# Patient Record
Sex: FEMALE | Race: WHITE | NOT HISPANIC OR LATINO | Employment: FULL TIME | URBAN - METROPOLITAN AREA
[De-identification: names, ages, dates, MRNs, and addresses within clinical notes are randomized per-mention and may not be internally consistent; named-entity substitution may affect disease eponyms.]

---

## 2021-04-22 ENCOUNTER — IMMUNIZATION (OUTPATIENT)
Dept: VACCINE CLINIC | Facility: HOSPITAL | Age: 36
End: 2021-04-22

## 2021-04-22 PROCEDURE — 91300 HC SARSCOV02 VAC 30MCG/0.3ML IM: CPT | Performed by: INTERNAL MEDICINE

## 2021-04-22 PROCEDURE — 0001A: CPT | Performed by: INTERNAL MEDICINE

## 2021-05-13 ENCOUNTER — IMMUNIZATION (OUTPATIENT)
Dept: VACCINE CLINIC | Facility: HOSPITAL | Age: 36
End: 2021-05-13

## 2021-05-13 PROCEDURE — 91300 HC SARSCOV02 VAC 30MCG/0.3ML IM: CPT | Performed by: INTERNAL MEDICINE

## 2021-05-13 PROCEDURE — 0002A: CPT | Performed by: INTERNAL MEDICINE

## 2021-09-17 ENCOUNTER — IMMUNIZATION (OUTPATIENT)
Dept: VACCINE CLINIC | Facility: HOSPITAL | Age: 36
End: 2021-09-17

## 2021-09-17 PROCEDURE — 0003A: CPT | Performed by: INTERNAL MEDICINE

## 2021-09-17 PROCEDURE — 91300 HC SARSCOV02 VAC 30MCG/0.3ML IM: CPT | Performed by: INTERNAL MEDICINE

## 2023-12-06 ENCOUNTER — HOSPITAL ENCOUNTER (INPATIENT)
Facility: HOSPITAL | Age: 38
LOS: 6 days | Discharge: HOME | DRG: 896 | End: 2023-12-12
Attending: EMERGENCY MEDICINE | Admitting: STUDENT IN AN ORGANIZED HEALTH CARE EDUCATION/TRAINING PROGRAM
Payer: OTHER GOVERNMENT

## 2023-12-06 DIAGNOSIS — R56.9 SEIZURE: Primary | ICD-10-CM

## 2023-12-06 DIAGNOSIS — E87.6 HYPOKALEMIA: ICD-10-CM

## 2023-12-06 DIAGNOSIS — E83.42 HYPOMAGNESEMIA: ICD-10-CM

## 2023-12-06 DIAGNOSIS — F10.930 ALCOHOL WITHDRAWAL SYNDROME WITHOUT COMPLICATION: ICD-10-CM

## 2023-12-06 DIAGNOSIS — D64.9 ANEMIA, UNSPECIFIED TYPE: ICD-10-CM

## 2023-12-06 DIAGNOSIS — K70.30 ALCOHOLIC CIRRHOSIS, UNSPECIFIED WHETHER ASCITES PRESENT: ICD-10-CM

## 2023-12-06 DIAGNOSIS — F10.10 ALCOHOL ABUSE: ICD-10-CM

## 2023-12-06 DIAGNOSIS — D69.6 THROMBOCYTOPENIA: ICD-10-CM

## 2023-12-06 PROBLEM — D61.818 PANCYTOPENIA: Status: ACTIVE | Noted: 2023-12-06

## 2023-12-06 PROBLEM — I85.00 ESOPHAGEAL VARICES: Status: ACTIVE | Noted: 2023-12-06

## 2023-12-06 PROBLEM — E11.9 DIABETES MELLITUS: Status: ACTIVE | Noted: 2023-12-06

## 2023-12-06 PROBLEM — F32.A DEPRESSION: Status: ACTIVE | Noted: 2023-12-06

## 2023-12-06 PROBLEM — F41.9 ANXIETY: Status: ACTIVE | Noted: 2023-12-06

## 2023-12-06 PROBLEM — K70.10 ALCOHOLIC HEPATITIS: Status: ACTIVE | Noted: 2023-12-06

## 2023-12-06 PROBLEM — F10.939 ALCOHOL WITHDRAWAL SEIZURE: Status: ACTIVE | Noted: 2023-12-06

## 2023-12-06 LAB
ALBUMIN SERPL-MCNC: 4.2 G/DL (ref 3.5–5.2)
ALBUMIN/GLOB SERPL: 1.4 G/DL
ALP SERPL-CCNC: 300 U/L (ref 39–117)
ALT SERPL W P-5'-P-CCNC: 54 U/L (ref 1–33)
AMMONIA BLD-SCNC: 33 UMOL/L (ref 11–51)
AMPHET+METHAMPHET UR QL: NEGATIVE
ANION GAP SERPL CALCULATED.3IONS-SCNC: 14 MMOL/L (ref 5–15)
AST SERPL-CCNC: 276 U/L (ref 1–32)
BARBITURATES UR QL SCN: NEGATIVE
BASOPHILS # BLD AUTO: 0.03 10*3/MM3 (ref 0–0.2)
BASOPHILS NFR BLD AUTO: 1 % (ref 0–1.5)
BENZODIAZ UR QL SCN: NEGATIVE
BILIRUB SERPL-MCNC: 8.4 MG/DL (ref 0–1.2)
BILIRUB UR QL STRIP: NEGATIVE
BUN SERPL-MCNC: 7 MG/DL (ref 6–20)
BUN/CREAT SERPL: 14.9 (ref 7–25)
CALCIUM SPEC-SCNC: 9.1 MG/DL (ref 8.6–10.5)
CANNABINOIDS SERPL QL: NEGATIVE
CHLORIDE SERPL-SCNC: 94 MMOL/L (ref 98–107)
CK SERPL-CCNC: 92 U/L (ref 20–180)
CLARITY UR: ABNORMAL
CO2 SERPL-SCNC: 28 MMOL/L (ref 22–29)
COCAINE UR QL: NEGATIVE
COLOR UR: ABNORMAL
CREAT SERPL-MCNC: 0.47 MG/DL (ref 0.57–1)
DEPRECATED RDW RBC AUTO: 49 FL (ref 37–54)
EGFRCR SERPLBLD CKD-EPI 2021: 124.4 ML/MIN/1.73
EOSINOPHIL # BLD AUTO: 0 10*3/MM3 (ref 0–0.4)
EOSINOPHIL NFR BLD AUTO: 0 % (ref 0.3–6.2)
ERYTHROCYTE [DISTWIDTH] IN BLOOD BY AUTOMATED COUNT: 14.9 % (ref 12.3–15.4)
ETHANOL BLD-MCNC: <10 MG/DL (ref 0–10)
ETHANOL UR QL: <0.01 %
FENTANYL UR-MCNC: NEGATIVE NG/ML
GLOBULIN UR ELPH-MCNC: 2.9 GM/DL
GLUCOSE SERPL-MCNC: 65 MG/DL (ref 65–99)
GLUCOSE UR STRIP-MCNC: NEGATIVE MG/DL
HCG SERPL QL: NEGATIVE
HCT VFR BLD AUTO: 33.8 % (ref 34–46.6)
HGB BLD-MCNC: 11.6 G/DL (ref 12–15.9)
HGB UR QL STRIP.AUTO: NEGATIVE
INR PPP: 1.81 (ref 0.9–1.1)
KETONES UR QL STRIP: ABNORMAL
LEUKOCYTE ESTERASE UR QL STRIP.AUTO: NEGATIVE
LYMPHOCYTES # BLD AUTO: 0.45 10*3/MM3 (ref 0.7–3.1)
LYMPHOCYTES NFR BLD AUTO: 14.4 % (ref 19.6–45.3)
MAGNESIUM SERPL-MCNC: 1 MG/DL (ref 1.6–2.6)
MCH RBC QN AUTO: 30.9 PG (ref 26.6–33)
MCHC RBC AUTO-ENTMCNC: 34.3 G/DL (ref 31.5–35.7)
MCV RBC AUTO: 89.9 FL (ref 79–97)
METHADONE UR QL SCN: NEGATIVE
MONOCYTES # BLD AUTO: 0.25 10*3/MM3 (ref 0.1–0.9)
MONOCYTES NFR BLD AUTO: 8 % (ref 5–12)
NEUTROPHILS NFR BLD AUTO: 2.37 10*3/MM3 (ref 1.7–7)
NEUTROPHILS NFR BLD AUTO: 76 % (ref 42.7–76)
NITRITE UR QL STRIP: NEGATIVE
OPIATES UR QL: NEGATIVE
OXYCODONE UR QL SCN: NEGATIVE
PH UR STRIP.AUTO: 8.5 [PH] (ref 5–8)
PLATELET # BLD AUTO: 28 10*3/MM3 (ref 140–450)
PMV BLD AUTO: 12.5 FL (ref 6–12)
POTASSIUM SERPL-SCNC: 2.9 MMOL/L (ref 3.5–5.2)
PROT SERPL-MCNC: 7.1 G/DL (ref 6–8.5)
PROT UR QL STRIP: ABNORMAL
PROTHROMBIN TIME: 21.3 SECONDS (ref 11.7–14.2)
RBC # BLD AUTO: 3.76 10*6/MM3 (ref 3.77–5.28)
SODIUM SERPL-SCNC: 136 MMOL/L (ref 136–145)
SP GR UR STRIP: 1.01 (ref 1–1.03)
UROBILINOGEN UR QL STRIP: ABNORMAL
WBC NRBC COR # BLD AUTO: 3.12 10*3/MM3 (ref 3.4–10.8)

## 2023-12-06 PROCEDURE — 80053 COMPREHEN METABOLIC PANEL: CPT | Performed by: EMERGENCY MEDICINE

## 2023-12-06 PROCEDURE — 82550 ASSAY OF CK (CPK): CPT | Performed by: EMERGENCY MEDICINE

## 2023-12-06 PROCEDURE — 83735 ASSAY OF MAGNESIUM: CPT | Performed by: EMERGENCY MEDICINE

## 2023-12-06 PROCEDURE — 82077 ASSAY SPEC XCP UR&BREATH IA: CPT | Performed by: EMERGENCY MEDICINE

## 2023-12-06 PROCEDURE — 81003 URINALYSIS AUTO W/O SCOPE: CPT | Performed by: EMERGENCY MEDICINE

## 2023-12-06 PROCEDURE — 85025 COMPLETE CBC W/AUTO DIFF WBC: CPT | Performed by: EMERGENCY MEDICINE

## 2023-12-06 PROCEDURE — 25010000002 MORPHINE PER 10 MG: Performed by: EMERGENCY MEDICINE

## 2023-12-06 PROCEDURE — 25010000002 MAGNESIUM SULFATE 2 GM/50ML SOLUTION: Performed by: EMERGENCY MEDICINE

## 2023-12-06 PROCEDURE — 80307 DRUG TEST PRSMV CHEM ANLYZR: CPT | Performed by: EMERGENCY MEDICINE

## 2023-12-06 PROCEDURE — 25010000002 PHENOBARBITAL PER 120 MG: Performed by: STUDENT IN AN ORGANIZED HEALTH CARE EDUCATION/TRAINING PROGRAM

## 2023-12-06 PROCEDURE — 63710000001 ONDANSETRON PER 8 MG: Performed by: STUDENT IN AN ORGANIZED HEALTH CARE EDUCATION/TRAINING PROGRAM

## 2023-12-06 PROCEDURE — 84703 CHORIONIC GONADOTROPIN ASSAY: CPT | Performed by: EMERGENCY MEDICINE

## 2023-12-06 PROCEDURE — 85610 PROTHROMBIN TIME: CPT | Performed by: EMERGENCY MEDICINE

## 2023-12-06 PROCEDURE — 25010000002 THIAMINE HCL 200 MG/2ML SOLUTION: Performed by: EMERGENCY MEDICINE

## 2023-12-06 PROCEDURE — 82140 ASSAY OF AMMONIA: CPT | Performed by: EMERGENCY MEDICINE

## 2023-12-06 PROCEDURE — 99285 EMERGENCY DEPT VISIT HI MDM: CPT

## 2023-12-06 PROCEDURE — 36415 COLL VENOUS BLD VENIPUNCTURE: CPT | Performed by: EMERGENCY MEDICINE

## 2023-12-06 PROCEDURE — 25810000003 SODIUM CHLORIDE 0.9 % SOLUTION: Performed by: EMERGENCY MEDICINE

## 2023-12-06 RX ORDER — SODIUM CHLORIDE 0.9 % (FLUSH) 0.9 %
10 SYRINGE (ML) INJECTION EVERY 12 HOURS SCHEDULED
Status: DISCONTINUED | OUTPATIENT
Start: 2023-12-06 | End: 2023-12-12 | Stop reason: HOSPADM

## 2023-12-06 RX ORDER — POLYETHYLENE GLYCOL 3350 17 G/17G
17 POWDER, FOR SOLUTION ORAL DAILY PRN
Status: DISCONTINUED | OUTPATIENT
Start: 2023-12-06 | End: 2023-12-12 | Stop reason: HOSPADM

## 2023-12-06 RX ORDER — SODIUM CHLORIDE 9 MG/ML
40 INJECTION, SOLUTION INTRAVENOUS AS NEEDED
Status: DISCONTINUED | OUTPATIENT
Start: 2023-12-06 | End: 2023-12-12 | Stop reason: HOSPADM

## 2023-12-06 RX ORDER — SODIUM CHLORIDE 9 MG/ML
75 INJECTION, SOLUTION INTRAVENOUS CONTINUOUS
Status: DISCONTINUED | OUTPATIENT
Start: 2023-12-06 | End: 2023-12-12 | Stop reason: HOSPADM

## 2023-12-06 RX ORDER — PHENOBARBITAL SODIUM 65 MG/ML
65 INJECTION INTRAMUSCULAR ONCE
Status: DISCONTINUED | OUTPATIENT
Start: 2023-12-07 | End: 2023-12-06

## 2023-12-06 RX ORDER — LEVOTHYROXINE SODIUM 0.1 MG/1
100 TABLET ORAL
Status: DISCONTINUED | OUTPATIENT
Start: 2023-12-07 | End: 2023-12-12 | Stop reason: HOSPADM

## 2023-12-06 RX ORDER — MORPHINE SULFATE 2 MG/ML
2 INJECTION, SOLUTION INTRAMUSCULAR; INTRAVENOUS ONCE
Status: COMPLETED | OUTPATIENT
Start: 2023-12-06 | End: 2023-12-06

## 2023-12-06 RX ORDER — LORAZEPAM 1 MG/1
1 TABLET ORAL ONCE
Status: COMPLETED | OUTPATIENT
Start: 2023-12-06 | End: 2023-12-06

## 2023-12-06 RX ORDER — POTASSIUM CHLORIDE 750 MG/1
40 TABLET, FILM COATED, EXTENDED RELEASE ORAL EVERY 4 HOURS
Status: COMPLETED | OUTPATIENT
Start: 2023-12-06 | End: 2023-12-07

## 2023-12-06 RX ORDER — BISACODYL 5 MG/1
5 TABLET, DELAYED RELEASE ORAL DAILY PRN
Status: DISCONTINUED | OUTPATIENT
Start: 2023-12-06 | End: 2023-12-12 | Stop reason: HOSPADM

## 2023-12-06 RX ORDER — ACETAMINOPHEN 160 MG/5ML
650 SOLUTION ORAL EVERY 4 HOURS PRN
Status: DISCONTINUED | OUTPATIENT
Start: 2023-12-06 | End: 2023-12-09

## 2023-12-06 RX ORDER — PANTOPRAZOLE SODIUM 40 MG/1
40 TABLET, DELAYED RELEASE ORAL DAILY
Status: DISCONTINUED | OUTPATIENT
Start: 2023-12-07 | End: 2023-12-07

## 2023-12-06 RX ORDER — MIDAZOLAM HYDROCHLORIDE 5 MG/ML
4 INJECTION INTRAMUSCULAR; INTRAVENOUS
Status: DISCONTINUED | OUTPATIENT
Start: 2023-12-06 | End: 2023-12-12 | Stop reason: HOSPADM

## 2023-12-06 RX ORDER — INSULIN LISPRO 100 [IU]/ML
2-7 INJECTION, SOLUTION INTRAVENOUS; SUBCUTANEOUS
Status: DISCONTINUED | OUTPATIENT
Start: 2023-12-06 | End: 2023-12-12 | Stop reason: HOSPADM

## 2023-12-06 RX ORDER — MIDAZOLAM HYDROCHLORIDE 1 MG/ML
4 INJECTION INTRAMUSCULAR; INTRAVENOUS
Status: DISCONTINUED | OUTPATIENT
Start: 2023-12-06 | End: 2023-12-12 | Stop reason: HOSPADM

## 2023-12-06 RX ORDER — SODIUM CHLORIDE 9 MG/ML
1000 INJECTION, SOLUTION INTRAVENOUS ONCE
Status: COMPLETED | OUTPATIENT
Start: 2023-12-06 | End: 2023-12-06

## 2023-12-06 RX ORDER — LORAZEPAM 1 MG/1
1 TABLET ORAL
Status: DISCONTINUED | OUTPATIENT
Start: 2023-12-06 | End: 2023-12-12 | Stop reason: HOSPADM

## 2023-12-06 RX ORDER — PHENOBARBITAL 64.8 MG/1
32.4 TABLET ORAL ONCE
Status: DISCONTINUED | OUTPATIENT
Start: 2023-12-08 | End: 2023-12-06

## 2023-12-06 RX ORDER — BISACODYL 10 MG
10 SUPPOSITORY, RECTAL RECTAL DAILY PRN
Status: DISCONTINUED | OUTPATIENT
Start: 2023-12-06 | End: 2023-12-12 | Stop reason: HOSPADM

## 2023-12-06 RX ORDER — ONDANSETRON 2 MG/ML
4 INJECTION INTRAMUSCULAR; INTRAVENOUS EVERY 6 HOURS PRN
Status: DISCONTINUED | OUTPATIENT
Start: 2023-12-06 | End: 2023-12-12 | Stop reason: HOSPADM

## 2023-12-06 RX ORDER — PREDNISOLONE SODIUM PHOSPHATE 15 MG/5ML
40 SOLUTION ORAL DAILY
Status: DISCONTINUED | OUTPATIENT
Start: 2023-12-07 | End: 2023-12-12 | Stop reason: HOSPADM

## 2023-12-06 RX ORDER — MAGNESIUM SULFATE HEPTAHYDRATE 40 MG/ML
2 INJECTION, SOLUTION INTRAVENOUS
Status: COMPLETED | OUTPATIENT
Start: 2023-12-06 | End: 2023-12-06

## 2023-12-06 RX ORDER — SODIUM CHLORIDE 0.9 % (FLUSH) 0.9 %
10 SYRINGE (ML) INJECTION AS NEEDED
Status: DISCONTINUED | OUTPATIENT
Start: 2023-12-06 | End: 2023-12-12 | Stop reason: HOSPADM

## 2023-12-06 RX ORDER — MAGNESIUM SULFATE HEPTAHYDRATE 40 MG/ML
2 INJECTION, SOLUTION INTRAVENOUS
Status: COMPLETED | OUTPATIENT
Start: 2023-12-06 | End: 2023-12-07

## 2023-12-06 RX ORDER — PHENOBARBITAL SODIUM 65 MG/ML
65 INJECTION INTRAMUSCULAR ONCE
Status: DISCONTINUED | OUTPATIENT
Start: 2023-12-08 | End: 2023-12-06

## 2023-12-06 RX ORDER — LORAZEPAM 1 MG/1
2 TABLET ORAL
Status: DISCONTINUED | OUTPATIENT
Start: 2023-12-06 | End: 2023-12-12 | Stop reason: HOSPADM

## 2023-12-06 RX ORDER — IBUPROFEN 600 MG/1
1 TABLET ORAL
Status: DISCONTINUED | OUTPATIENT
Start: 2023-12-06 | End: 2023-12-12 | Stop reason: HOSPADM

## 2023-12-06 RX ORDER — ACETAMINOPHEN 650 MG/1
650 SUPPOSITORY RECTAL EVERY 4 HOURS PRN
Status: DISCONTINUED | OUTPATIENT
Start: 2023-12-06 | End: 2023-12-09

## 2023-12-06 RX ORDER — NICOTINE POLACRILEX 4 MG
15 LOZENGE BUCCAL
Status: DISCONTINUED | OUTPATIENT
Start: 2023-12-06 | End: 2023-12-12 | Stop reason: HOSPADM

## 2023-12-06 RX ORDER — ONDANSETRON 4 MG/1
4 TABLET, FILM COATED ORAL EVERY 6 HOURS PRN
Status: DISCONTINUED | OUTPATIENT
Start: 2023-12-06 | End: 2023-12-12 | Stop reason: HOSPADM

## 2023-12-06 RX ORDER — ACETAMINOPHEN 325 MG/1
650 TABLET ORAL EVERY 4 HOURS PRN
Status: DISCONTINUED | OUTPATIENT
Start: 2023-12-06 | End: 2023-12-09

## 2023-12-06 RX ORDER — DEXTROSE MONOHYDRATE 25 G/50ML
25 INJECTION, SOLUTION INTRAVENOUS
Status: DISCONTINUED | OUTPATIENT
Start: 2023-12-06 | End: 2023-12-12 | Stop reason: HOSPADM

## 2023-12-06 RX ORDER — THIAMINE HYDROCHLORIDE 100 MG/ML
200 INJECTION, SOLUTION INTRAMUSCULAR; INTRAVENOUS EVERY 8 HOURS SCHEDULED
Status: DISPENSED | OUTPATIENT
Start: 2023-12-07 | End: 2023-12-12

## 2023-12-06 RX ORDER — THIAMINE HYDROCHLORIDE 100 MG/ML
200 INJECTION, SOLUTION INTRAMUSCULAR; INTRAVENOUS ONCE
Status: COMPLETED | OUTPATIENT
Start: 2023-12-06 | End: 2023-12-06

## 2023-12-06 RX ORDER — PHENOBARBITAL 64.8 MG/1
32.4 TABLET ORAL ONCE
Status: DISCONTINUED | OUTPATIENT
Start: 2023-12-09 | End: 2023-12-06

## 2023-12-06 RX ORDER — FOLIC ACID 1 MG/1
1 TABLET ORAL DAILY
Status: DISCONTINUED | OUTPATIENT
Start: 2023-12-07 | End: 2023-12-12 | Stop reason: HOSPADM

## 2023-12-06 RX ORDER — CHOLECALCIFEROL (VITAMIN D3) 125 MCG
5 CAPSULE ORAL NIGHTLY PRN
Status: DISCONTINUED | OUTPATIENT
Start: 2023-12-06 | End: 2023-12-12 | Stop reason: HOSPADM

## 2023-12-06 RX ORDER — AMOXICILLIN 250 MG
2 CAPSULE ORAL 2 TIMES DAILY PRN
Status: DISCONTINUED | OUTPATIENT
Start: 2023-12-06 | End: 2023-12-12 | Stop reason: HOSPADM

## 2023-12-06 RX ORDER — MIDAZOLAM HYDROCHLORIDE 1 MG/ML
2 INJECTION INTRAMUSCULAR; INTRAVENOUS
Status: DISCONTINUED | OUTPATIENT
Start: 2023-12-06 | End: 2023-12-12 | Stop reason: HOSPADM

## 2023-12-06 RX ADMIN — LORAZEPAM 1 MG: 1 TABLET ORAL at 17:14

## 2023-12-06 RX ADMIN — LORAZEPAM 1 MG: 1 TABLET ORAL at 19:47

## 2023-12-06 RX ADMIN — FOLIC ACID 1 MG: 5 INJECTION, SOLUTION INTRAMUSCULAR; INTRAVENOUS; SUBCUTANEOUS at 17:34

## 2023-12-06 RX ADMIN — MAGNESIUM SULFATE HEPTAHYDRATE 2 G: 40 INJECTION, SOLUTION INTRAVENOUS at 22:45

## 2023-12-06 RX ADMIN — MAGNESIUM SULFATE HEPTAHYDRATE 2 G: 40 INJECTION, SOLUTION INTRAVENOUS at 19:48

## 2023-12-06 RX ADMIN — POTASSIUM CHLORIDE 40 MEQ: 750 TABLET, EXTENDED RELEASE ORAL at 19:12

## 2023-12-06 RX ADMIN — LORAZEPAM 1 MG: 1 TABLET ORAL at 22:44

## 2023-12-06 RX ADMIN — THIAMINE HYDROCHLORIDE 200 MG: 100 INJECTION, SOLUTION INTRAMUSCULAR; INTRAVENOUS at 17:30

## 2023-12-06 RX ADMIN — Medication 10 ML: at 22:00

## 2023-12-06 RX ADMIN — SODIUM CHLORIDE 1000 ML: 9 INJECTION, SOLUTION INTRAVENOUS at 17:29

## 2023-12-06 RX ADMIN — PHENOBARBITAL SODIUM 109.2 MG: 65 INJECTION INTRAMUSCULAR at 22:30

## 2023-12-06 RX ADMIN — MAGNESIUM SULFATE HEPTAHYDRATE 2 G: 2 INJECTION, SOLUTION INTRAVENOUS at 19:14

## 2023-12-06 RX ADMIN — ONDANSETRON HYDROCHLORIDE 4 MG: 4 TABLET, FILM COATED ORAL at 22:45

## 2023-12-06 RX ADMIN — MORPHINE SULFATE 2 MG: 2 INJECTION, SOLUTION INTRAMUSCULAR; INTRAVENOUS at 19:47

## 2023-12-06 NOTE — ED TRIAGE NOTES
"Patient to er from home per ems with c/o  called 911 related to witnessed the patient having a seizure that lasted aprox 3 min, was tonic- clonic in nature. Ems reported patient stopped drinking about 2 days ago. Reported she drinks about a half gallon a day.\" Patient reported no seizure disorder in the past. Patient alert in triage x 4.   "
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
## 2023-12-07 ENCOUNTER — APPOINTMENT (OUTPATIENT)
Dept: CT IMAGING | Facility: HOSPITAL | Age: 38
DRG: 896 | End: 2023-12-07
Payer: OTHER GOVERNMENT

## 2023-12-07 PROBLEM — D64.9 ANEMIA: Status: ACTIVE | Noted: 2023-12-07

## 2023-12-07 PROBLEM — E43 SEVERE MALNUTRITION: Status: ACTIVE | Noted: 2023-12-07

## 2023-12-07 PROBLEM — D69.6 THROMBOCYTOPENIA: Status: ACTIVE | Noted: 2023-12-07

## 2023-12-07 LAB
ALBUMIN SERPL-MCNC: 3.3 G/DL (ref 3.5–5.2)
ALP SERPL-CCNC: 248 U/L (ref 39–117)
ALT SERPL W P-5'-P-CCNC: 45 U/L (ref 1–33)
ANION GAP SERPL CALCULATED.3IONS-SCNC: 10.3 MMOL/L (ref 5–15)
AST SERPL-CCNC: 216 U/L (ref 1–32)
BASOPHILS # BLD AUTO: 0.02 10*3/MM3 (ref 0–0.2)
BASOPHILS NFR BLD AUTO: 0.3 % (ref 0–1.5)
BILIRUB CONJ SERPL-MCNC: 6.7 MG/DL (ref 0–0.3)
BILIRUB INDIRECT SERPL-MCNC: 1 MG/DL
BILIRUB SERPL-MCNC: 7.7 MG/DL (ref 0–1.2)
BUN SERPL-MCNC: 7 MG/DL (ref 6–20)
BUN/CREAT SERPL: 16.3 (ref 7–25)
CALCIUM SPEC-SCNC: 8.2 MG/DL (ref 8.6–10.5)
CHLORIDE SERPL-SCNC: 100 MMOL/L (ref 98–107)
CO2 SERPL-SCNC: 22.7 MMOL/L (ref 22–29)
CREAT SERPL-MCNC: 0.43 MG/DL (ref 0.57–1)
DEPRECATED RDW RBC AUTO: 48.3 FL (ref 37–54)
EGFRCR SERPLBLD CKD-EPI 2021: 127.1 ML/MIN/1.73
EOSINOPHIL # BLD AUTO: 0.02 10*3/MM3 (ref 0–0.4)
EOSINOPHIL NFR BLD AUTO: 0.3 % (ref 0.3–6.2)
ERYTHROCYTE [DISTWIDTH] IN BLOOD BY AUTOMATED COUNT: 14.4 % (ref 12.3–15.4)
GLUCOSE BLDC GLUCOMTR-MCNC: 196 MG/DL (ref 70–130)
GLUCOSE BLDC GLUCOMTR-MCNC: 282 MG/DL (ref 70–130)
GLUCOSE BLDC GLUCOMTR-MCNC: 327 MG/DL (ref 70–130)
GLUCOSE BLDC GLUCOMTR-MCNC: 333 MG/DL (ref 70–130)
GLUCOSE SERPL-MCNC: 304 MG/DL (ref 65–99)
HBA1C MFR BLD: 7.9 % (ref 4.8–5.6)
HCT VFR BLD AUTO: 28.8 % (ref 34–46.6)
HGB BLD-MCNC: 10 G/DL (ref 12–15.9)
IMM GRANULOCYTES # BLD AUTO: 0.04 10*3/MM3 (ref 0–0.05)
IMM GRANULOCYTES NFR BLD AUTO: 0.7 % (ref 0–0.5)
LDH SERPL-CCNC: 238 U/L (ref 135–214)
LYMPHOCYTES # BLD AUTO: 0.75 10*3/MM3 (ref 0.7–3.1)
LYMPHOCYTES NFR BLD AUTO: 12.9 % (ref 19.6–45.3)
MAGNESIUM SERPL-MCNC: 2 MG/DL (ref 1.6–2.6)
MAGNESIUM SERPL-MCNC: 2.7 MG/DL (ref 1.6–2.6)
MCH RBC QN AUTO: 32.3 PG (ref 26.6–33)
MCHC RBC AUTO-ENTMCNC: 34.7 G/DL (ref 31.5–35.7)
MCV RBC AUTO: 92.9 FL (ref 79–97)
MONOCYTES # BLD AUTO: 0.42 10*3/MM3 (ref 0.1–0.9)
MONOCYTES NFR BLD AUTO: 7.2 % (ref 5–12)
NEUTROPHILS NFR BLD AUTO: 4.56 10*3/MM3 (ref 1.7–7)
NEUTROPHILS NFR BLD AUTO: 78.6 % (ref 42.7–76)
NRBC BLD AUTO-RTO: 0 /100 WBC (ref 0–0.2)
PHOSPHATE SERPL-MCNC: 2.4 MG/DL (ref 2.5–4.5)
PLATELET # BLD AUTO: 25 10*3/MM3 (ref 140–450)
PLATELET # BLD AUTO: 25 10*3/MM3 (ref 140–450)
PLATELETS.RETICULATED NFR BLD AUTO: 16.4 % (ref 0.9–6.5)
PMV BLD AUTO: 12.7 FL (ref 6–12)
POTASSIUM SERPL-SCNC: 4.2 MMOL/L (ref 3.5–5.2)
POTASSIUM SERPL-SCNC: 4.2 MMOL/L (ref 3.5–5.2)
PROT SERPL-MCNC: 5.9 G/DL (ref 6–8.5)
RBC # BLD AUTO: 3.1 10*6/MM3 (ref 3.77–5.28)
SODIUM SERPL-SCNC: 133 MMOL/L (ref 136–145)
VIT B12 BLD-MCNC: 893 PG/ML (ref 211–946)
WBC NRBC COR # BLD AUTO: 5.81 10*3/MM3 (ref 3.4–10.8)

## 2023-12-07 PROCEDURE — 86258 DGP ANTIBODY EACH IG CLASS: CPT | Performed by: INTERNAL MEDICINE

## 2023-12-07 PROCEDURE — 82948 REAGENT STRIP/BLOOD GLUCOSE: CPT

## 2023-12-07 PROCEDURE — 83036 HEMOGLOBIN GLYCOSYLATED A1C: CPT | Performed by: STUDENT IN AN ORGANIZED HEALTH CARE EDUCATION/TRAINING PROGRAM

## 2023-12-07 PROCEDURE — 63710000001 INSULIN GLARGINE PER 5 UNITS: Performed by: HOSPITALIST

## 2023-12-07 PROCEDURE — 97166 OT EVAL MOD COMPLEX 45 MIN: CPT

## 2023-12-07 PROCEDURE — 25010000002 MIDAZOLAM PER 1 MG: Performed by: STUDENT IN AN ORGANIZED HEALTH CARE EDUCATION/TRAINING PROGRAM

## 2023-12-07 PROCEDURE — 25010000002 THIAMINE HCL 200 MG/2ML SOLUTION: Performed by: STUDENT IN AN ORGANIZED HEALTH CARE EDUCATION/TRAINING PROGRAM

## 2023-12-07 PROCEDURE — 85025 COMPLETE CBC W/AUTO DIFF WBC: CPT | Performed by: STUDENT IN AN ORGANIZED HEALTH CARE EDUCATION/TRAINING PROGRAM

## 2023-12-07 PROCEDURE — 97530 THERAPEUTIC ACTIVITIES: CPT

## 2023-12-07 PROCEDURE — 84132 ASSAY OF SERUM POTASSIUM: CPT | Performed by: EMERGENCY MEDICINE

## 2023-12-07 PROCEDURE — 82607 VITAMIN B-12: CPT | Performed by: INTERNAL MEDICINE

## 2023-12-07 PROCEDURE — 84100 ASSAY OF PHOSPHORUS: CPT | Performed by: STUDENT IN AN ORGANIZED HEALTH CARE EDUCATION/TRAINING PROGRAM

## 2023-12-07 PROCEDURE — 25810000003 SODIUM CHLORIDE 0.9 % SOLUTION: Performed by: STUDENT IN AN ORGANIZED HEALTH CARE EDUCATION/TRAINING PROGRAM

## 2023-12-07 PROCEDURE — 82784 ASSAY IGA/IGD/IGG/IGM EACH: CPT | Performed by: INTERNAL MEDICINE

## 2023-12-07 PROCEDURE — 63710000001 INSULIN LISPRO (HUMAN) PER 5 UNITS: Performed by: STUDENT IN AN ORGANIZED HEALTH CARE EDUCATION/TRAINING PROGRAM

## 2023-12-07 PROCEDURE — 80048 BASIC METABOLIC PNL TOTAL CA: CPT | Performed by: STUDENT IN AN ORGANIZED HEALTH CARE EDUCATION/TRAINING PROGRAM

## 2023-12-07 PROCEDURE — 83735 ASSAY OF MAGNESIUM: CPT | Performed by: STUDENT IN AN ORGANIZED HEALTH CARE EDUCATION/TRAINING PROGRAM

## 2023-12-07 PROCEDURE — 83615 LACTATE (LD) (LDH) ENZYME: CPT | Performed by: INTERNAL MEDICINE

## 2023-12-07 PROCEDURE — 99222 1ST HOSP IP/OBS MODERATE 55: CPT

## 2023-12-07 PROCEDURE — 25010000002 MAGNESIUM SULFATE 2 GM/50ML SOLUTION: Performed by: EMERGENCY MEDICINE

## 2023-12-07 PROCEDURE — 86364 TISS TRNSGLTMNASE EA IG CLAS: CPT | Performed by: INTERNAL MEDICINE

## 2023-12-07 PROCEDURE — 63710000001 PREDNISOLONE PER 5 MG: Performed by: STUDENT IN AN ORGANIZED HEALTH CARE EDUCATION/TRAINING PROGRAM

## 2023-12-07 PROCEDURE — 85055 RETICULATED PLATELET ASSAY: CPT | Performed by: INTERNAL MEDICINE

## 2023-12-07 PROCEDURE — 97161 PT EVAL LOW COMPLEX 20 MIN: CPT

## 2023-12-07 PROCEDURE — 80076 HEPATIC FUNCTION PANEL: CPT | Performed by: HOSPITALIST

## 2023-12-07 RX ORDER — PANTOPRAZOLE SODIUM 40 MG/1
40 TABLET, DELAYED RELEASE ORAL
Status: DISCONTINUED | OUTPATIENT
Start: 2023-12-07 | End: 2023-12-12 | Stop reason: HOSPADM

## 2023-12-07 RX ADMIN — LEVOTHYROXINE SODIUM 100 MCG: 100 TABLET ORAL at 06:30

## 2023-12-07 RX ADMIN — MIDAZOLAM HYDROCHLORIDE 2 MG: 2 INJECTION, SOLUTION INTRAMUSCULAR; INTRAVENOUS at 21:27

## 2023-12-07 RX ADMIN — LORAZEPAM 2 MG: 1 TABLET ORAL at 20:36

## 2023-12-07 RX ADMIN — LORAZEPAM 1 MG: 1 TABLET ORAL at 06:24

## 2023-12-07 RX ADMIN — Medication 10 ML: at 08:29

## 2023-12-07 RX ADMIN — INSULIN GLARGINE 10 UNITS: 100 INJECTION, SOLUTION SUBCUTANEOUS at 22:25

## 2023-12-07 RX ADMIN — MIDAZOLAM HYDROCHLORIDE 4 MG: 2 INJECTION, SOLUTION INTRAMUSCULAR; INTRAVENOUS at 22:25

## 2023-12-07 RX ADMIN — POTASSIUM CHLORIDE 40 MEQ: 750 TABLET, EXTENDED RELEASE ORAL at 04:20

## 2023-12-07 RX ADMIN — LORAZEPAM 1 MG: 1 TABLET ORAL at 09:55

## 2023-12-07 RX ADMIN — RIFAXIMIN 550 MG: 550 TABLET ORAL at 22:25

## 2023-12-07 RX ADMIN — MAGNESIUM SULFATE HEPTAHYDRATE 2 G: 40 INJECTION, SOLUTION INTRAVENOUS at 00:44

## 2023-12-07 RX ADMIN — FOLIC ACID 1 MG: 1 TABLET ORAL at 08:30

## 2023-12-07 RX ADMIN — MIDAZOLAM HYDROCHLORIDE 4 MG: 2 INJECTION, SOLUTION INTRAMUSCULAR; INTRAVENOUS at 16:12

## 2023-12-07 RX ADMIN — THIAMINE HYDROCHLORIDE 200 MG: 100 INJECTION, SOLUTION INTRAMUSCULAR; INTRAVENOUS at 14:34

## 2023-12-07 RX ADMIN — INSULIN LISPRO 5 UNITS: 100 INJECTION, SOLUTION INTRAVENOUS; SUBCUTANEOUS at 22:25

## 2023-12-07 RX ADMIN — INSULIN LISPRO 5 UNITS: 100 INJECTION, SOLUTION INTRAVENOUS; SUBCUTANEOUS at 12:03

## 2023-12-07 RX ADMIN — MIDAZOLAM HYDROCHLORIDE 4 MG: 2 INJECTION, SOLUTION INTRAMUSCULAR; INTRAVENOUS at 22:04

## 2023-12-07 RX ADMIN — MIDAZOLAM HYDROCHLORIDE 4 MG: 2 INJECTION, SOLUTION INTRAMUSCULAR; INTRAVENOUS at 02:00

## 2023-12-07 RX ADMIN — MIDAZOLAM HYDROCHLORIDE 4 MG: 2 INJECTION, SOLUTION INTRAMUSCULAR; INTRAVENOUS at 00:49

## 2023-12-07 RX ADMIN — LORAZEPAM 1 MG: 1 TABLET ORAL at 12:00

## 2023-12-07 RX ADMIN — MAGNESIUM SULFATE HEPTAHYDRATE 2 G: 40 INJECTION, SOLUTION INTRAVENOUS at 02:50

## 2023-12-07 RX ADMIN — LORAZEPAM 1 MG: 1 TABLET ORAL at 04:32

## 2023-12-07 RX ADMIN — THIAMINE HYDROCHLORIDE 200 MG: 100 INJECTION, SOLUTION INTRAMUSCULAR; INTRAVENOUS at 06:16

## 2023-12-07 RX ADMIN — INSULIN LISPRO 4 UNITS: 100 INJECTION, SOLUTION INTRAVENOUS; SUBCUTANEOUS at 17:43

## 2023-12-07 RX ADMIN — POTASSIUM CHLORIDE 40 MEQ: 750 TABLET, EXTENDED RELEASE ORAL at 00:44

## 2023-12-07 RX ADMIN — SODIUM CHLORIDE 75 ML/HR: 9 INJECTION, SOLUTION INTRAVENOUS at 00:56

## 2023-12-07 RX ADMIN — PREDNISOLONE SODIUM PHOSPHATE 40 MG: 15 SOLUTION ORAL at 08:28

## 2023-12-07 RX ADMIN — MIDAZOLAM HYDROCHLORIDE 4 MG: 2 INJECTION, SOLUTION INTRAMUSCULAR; INTRAVENOUS at 17:39

## 2023-12-07 RX ADMIN — PANTOPRAZOLE SODIUM 40 MG: 40 TABLET, DELAYED RELEASE ORAL at 08:29

## 2023-12-07 RX ADMIN — INSULIN LISPRO 2 UNITS: 100 INJECTION, SOLUTION INTRAVENOUS; SUBCUTANEOUS at 01:42

## 2023-12-07 RX ADMIN — MIDAZOLAM HYDROCHLORIDE 4 MG: 2 INJECTION, SOLUTION INTRAMUSCULAR; INTRAVENOUS at 14:31

## 2023-12-07 RX ADMIN — MIDAZOLAM HYDROCHLORIDE 2 MG: 2 INJECTION, SOLUTION INTRAMUSCULAR; INTRAVENOUS at 13:05

## 2023-12-07 RX ADMIN — THIAMINE HYDROCHLORIDE 200 MG: 100 INJECTION, SOLUTION INTRAMUSCULAR; INTRAVENOUS at 22:25

## 2023-12-07 NOTE — THERAPY EVALUATION
Patient Name: Corinna Almonte  : 1985    MRN: [id number]                              Today's Date: 2023       Admit Date: 2023    Visit Dx:     ICD-10-CM ICD-9-CM   1. Seizure  R56.9 780.39   2. Alcoholic cirrhosis, unspecified whether ascites present  K70.30 571.2   3. Hypomagnesemia  E83.42 275.2   4. Hypokalemia  E87.6 276.8   5. Alcohol abuse  F10.10 305.00   6. Alcohol withdrawal syndrome without complication  F10.930 291.81   7. Thrombocytopenia  D69.6 287.5     Patient Active Problem List   Diagnosis    Polysubstance overdose    Alcohol withdrawal seizure    Alcoholic hepatitis    Alcoholic cirrhosis    Esophageal varices    Pancytopenia    Hypomagnesemia    Hypokalemia    Diabetes mellitus    Anxiety    Depression     Past Medical History:   Diagnosis Date    Alcohol abuse with alcohol-induced anxiety disorder     Anxiety     Depression     Disease of thyroid gland     Status post alcohol detoxification      Past Surgical History:   Procedure Laterality Date     SECTION      x2    TONSILLECTOMY        General Information       Row Name 23 0955          Physical Therapy Time and Intention    Document Type evaluation  -CS     Mode of Treatment co-treatment;physical therapy;occupational therapy  due to medical complexity and generalized weakness with balance deficits  -CS       Row Name 23 0955          General Information    Patient Profile Reviewed yes  -CS     Prior Level of Function independent:;gait;transfer;bed mobility  pt denies as use of AD at [facility]  -CS     Existing Precautions/Restrictions fall  -CS     Barriers to Rehab medically complex  -CS       Row Name 23 0955          Living Environment    People in Home spouse  -CS       Row Name 23 0955          Home Main Entrance    Number of Stairs, Main Entrance five  -CS     Stair Railings, Main Entrance none  -CS       Row Name 23 0955          Stairs Within Home, Primary    Number of Stairs, Within  Home, Primary twelve  -CS     Stair Railings, Within Home, Primary railings safe and in good condition  -CS     Stairs Comment, Within Home, Primary bed/bath on 2nd floor  -CS       Row Name 12/07/23 0955          Cognition    Orientation Status (Cognition) oriented to;person;place;time  -CS       Row Name 12/07/23 0955          Safety Issues, Functional Mobility    Safety Issues Affecting Function (Mobility) awareness of need for assistance;impulsivity;insight into deficits/self-awareness;judgment;safety precaution awareness;safety precautions follow-through/compliance  -CS     Impairments Affecting Function (Mobility) balance;strength;sensation/sensory awareness;endurance/activity tolerance  -CS               User Key  (r) = Recorded By, (t) = Taken By, (c) = Cosigned By      Initials Name Provider Type    CS Sharon Dawn PT Physical Therapist                   Mobility       Row Name 12/07/23 0957          Bed Mobility    Bed Mobility supine-sit;sit-supine  -CS     Supine-Sit [place] (Bed Mobility) standby assist  -CS     Sit-Supine Quay (Bed Mobility) standby assist  -CS       Row Name 12/07/23 0957          Sit-Stand Transfer    Sit-Stand [place] (Transfers) standby assist  -CS     Assistive Device (Sit-Stand Transfers) other (see comments)  NO AD  -CS       Row Name 12/07/23 0957          Gait/Stairs (Locomotion)    [place] Level (Gait) contact guard;minimum assist (75% patient effort);verbal cues  -CS     Assistive Device (Gait) walker, front-wheeled  -CS     Distance in Feet (Gait) 200'  -CS     Deviations/Abnormal Patterns (Gait) naomi decreased;gait speed decreased;stride length decreased  -CS     [place]ay Level (Stairs) not tested  -CS     Comment, (Gait/Stairs) very unsteady with multiple LOB; veers L requiring assist to manage RW at times - cues provided  -CS               User Key  (r) = Recorded By, (t) = Taken By, (c) = Cosigned By      Initials Name Provider Type     Sharon Lowery, PT Physical Therapist                   Obj/Interventions       Row Name 12/07/23 0958          Range of Motion Comprehensive    General Range of Motion bilateral lower extremity ROM WFL  -       Row Name 12/07/23 0958          Strength Comprehensive (MMT)    General Manual Muscle Testing (MMT) Assessment other (see comments)  -     Comment, General Manual Muscle Testing (MMT) Assessment generalized weakness; B LE grossly 4-/5  -CS       Row Name 12/07/23 0958          Balance    Balance Assessment sitting static balance;sitting dynamic balance;standing static balance;standing dynamic balance  -CS     Static Sitting Balance standby assist  -CS     Dynamic Sitting Balance standby assist  -CS     Position, Sitting Balance unsupported;sitting edge of bed  -     Static Standing Balance standby assist;contact guard  -     Dynamic Standing Balance contact guard;minimal assist  -CS     Position/Device Used, Standing Balance supported;walker, front-wheeled  -     Comment, Balance unsteady gait  -CS               User Key  (r) = Recorded By, (t) = Taken By, (c) = Cosigned By      Initials Name Provider Type    CS Sharon Dawn, PT Physical Therapist                   Goals/Plan       Row Name 12/07/23 1004          Bed Mobility Goal 1 (PT)    Activity/Assistive Device (Bed Mobility Goal 1, PT) bed mobility activities, all  -CS     [place] Level/Cues Needed (Bed Mobility Goal 1, PT) independent  -CS     Time Frame (Bed Mobility Goal 1, PT) 2 weeks  -       Row Name 12/07/23 1004          Transfer Goal 1 (PT)    Activity/Assistive Device (Transfer Goal 1, PT) sit-to-stand/stand-to-sit;bed-to-chair/chair-to-bed  -CS     [place] Level/Cues Needed (Transfer Goal 1, PT) independent  -CS     Time Frame (Transfer Goal 1, PT) 2 weeks  -       Row Name 12/07/23 1004          Gait Training Goal 1 (PT)    Activity/Assistive Device (Gait Training Goal 1, PT) gait (walking  locomotion);assistive device use;improve balance and speed  -CS     [place] Level (Gait Training Goal 1, PT) modified independence  -CS     Distance (Gait Training Goal 1, PT) 200'  -CS     Time Frame (Gait Training Goal 1, PT) 2 weeks  -CS               User Key  (r) = Recorded By, (t) = Taken By, (c) = Cosigned By      Initials Name Provider Type    CS Sharon Dawn, PT Physical Therapist                   Clinical Impression       Row Name 12/07/23 0958          Pain    Pretreatment Pain Rating 0/10 - no pain  -CS     Posttreatment Pain Rating 0/10 - no pain  -CS       Row Name 12/07/23 0958          Plan of Care Review    Plan of Care Reviewed With patient  -CS     Outcome Evaluation Pt is a 37 y/o F admitted to [facility] after witnessed seizure-like activity most likely due to alcohol withdrawal. Pt has a past med hx of alcoholic hepatitis, TBI, IgA nephropathy, chronic pancreatitis, cirrhosis with esophageal varices, anxiety, hypothyroidism,and DM. Pt reports she lives with her spouse with 5 JOLENE and resides on 2nd floor of home. Pt denies any use of DME at [facility]. Pt reports 3-4 falls per week. Pt presents to PT with generalized weakness, decreased endurance, and impaired balance. Pt completed bed mobility and STS transfer with SBA. Pt ambulated 200' c RW requiring CGA/min A. Pt demo's a slow and unsteady gait with multiple LOB. Pt veers L with poor peripheral vision requiring assist to manage RW at times. Pt encouraged to call for assist. Pt will need 24/7 assist at D/C. PT recommends outpatient PT services at D/C pending progress.  -CS       Row Name 12/07/23 0958          Therapy Assessment/Plan (PT)    Patient/Family Therapy Goals Statement (PT) to return home  -CS     Rehab Potential (PT) good, to achieve stated therapy goals  -CS     Criteria for Skilled Interventions Met (PT) yes;meets criteria  -CS     Therapy Frequency (PT) 5 times/wk  -CS       Row Name 12/07/23 0958          Positioning and  Restraints    Pre-Treatment Position in bed  -CS     Post Treatment Position bed  -CS     In Bed notified nsg;supine;call light within reach;encouraged to call for assist;exit alarm on  -CS               User Key  (r) = Recorded By, (t) = Taken By, (c) = Cosigned By      Initials Name Provider Type    Sharon Lowery, PT Physical Therapist                   Outcome Measures       Row Name 12/07/23 1004 12/07/23 0123       How much help from another person do you currently need...    Turning from your back to your side while in flat bed without using bedrails? 4  -CS 4  -JW    Moving from lying on back to sitting on the side of a flat bed without bedrails? 4  -CS 4  -JW    Moving to and from a bed to a chair (including a wheelchair)? 4  -CS 3  -JW    Standing up from a chair using your arms (e.g., wheelchair, bedside chair)? 4  -CS 3  -JW    Climbing 3-5 steps with a railing? 3  -CS 3  -JW    To walk in hospital room? 3  -CS 3  -JW    AM-PAC 6 Clicks Score (PT) 22  -CS 20  -JW    Highest Level of Mobility Goal 7 --> Walk 25 feet or more  -CS 6 --> Walk 10 steps or more  -JW      Row Name 12/07/23 1004          Functional Assessment    Outcome Measure Options AM-PAC 6 Clicks Basic Mobility (PT)  -CS               User Key  (r) = Recorded By, (t) = Taken By, (c) = Cosigned By      Initials Name Provider Type    Sharon Lowery, PT Physical Therapist    Greer Gallegos, RN Registered Nurse                                 Physical Therapy Education       Title: PT OT SLP Therapies (In Progress)       Topic: Physical Therapy (In Progress)       Point: Mobility training (Not Started)       Learner Progress:  Not documented in this visit.              Point: Home exercise program (Not Started)       Learner Progress:  Not documented in this visit.              Point: Body mechanics (Done)       Learning Progress Summary             Patient Acceptance, E,TB, VU,DU,NR by  at 12/7/2023 1005                          Point: Precautions (Not Started)       Learner Progress:  Not documented in this visit.                              User Key       Initials Effective Dates Name Provider Type Discipline    CS 09/22/22 -  Sharon Dawn, PT Physical Therapist PT                  PT Recommendation and Plan     Plan of Care Reviewed With: patient  Outcome Evaluation: Pt is a 39 y/o F admitted to [facility] after witnessed seizure-like activity most likely due to alcohol withdrawal. Pt has a past med hx of alcoholic hepatitis, TBI, IgA nephropathy, chronic pancreatitis, cirrhosis with esophageal varices, anxiety, hypothyroidism,and DM. Pt reports she lives with her spouse with 5 JOLENE and resides on 2nd floor of home. Pt denies any use of DME at [facility]. Pt reports 3-4 falls per week. Pt presents to PT with generalized weakness, decreased endurance, and impaired balance. Pt completed bed mobility and STS transfer with SBA. Pt ambulated 200' c RW requiring CGA/min A. Pt demo's a slow and unsteady gait with multiple LOB. Pt veers L with poor peripheral vision requiring assist to manage RW at times. Pt encouraged to call for assist. Pt will need 24/7 assist at D/C. PT recommends outpatient PT services at D/C pending progress.     Time Calculation:         PT Charges       Row Name 12/07/23 1006             Time Calculation    Start Time 0845  -CS      Stop Time 0858  -CS      Time Calculation (min) 13 min  -CS      PT Received On 12/07/23  -      PT - Next Appointment 12/08/23  -      PT Goal Re-Cert Due Date 12/21/23  -         Time Calculation- PT    Total Timed Code Minutes- PT 8 minute(s)  -CS         Timed Charges    25759 - PT Therapeutic Activity Minutes 8  -CS         Total Minutes    Timed Charges Total Minutes 8  -CS       Total Minutes 8  -CS                User Key  (r) = Recorded By, (t) = Taken By, (c) = Cosigned By      Initials Name Provider Type    CS Sharon Dawn, PT Physical Therapist                  Therapy Charges for  Today       Code Description Service Date Service Provider Modifiers Qty    98610927956 HC PT THERAPEUTIC ACT EA 15 MIN 12/7/2023 Sharon Dawn, PT GP 1    96430616968 HC PT EVAL LOW COMPLEXITY 3 12/7/2023 Sharon Dawn, PT GP 1            PT G-Codes  Outcome Measure Options: AM-PAC 6 Clicks Basic Mobility (PT)  AM-PAC 6 Clicks Score (PT): 22  PT Discharge Summary  Anticipated Discharge Disposition (PT): home with 24/7 care, home with outpatient therapy services    Sharon Dawn PT  12/7/2023

## 2023-12-07 NOTE — CASE MANAGEMENT/SOCIAL WORK
Patient has [facility] and requested to transfer.  However, the [facility] is currently on [facility].  Patient notified and [facility] called to get referral ID and notification ID.  Referral ID:  [id number]  Notification ID:  [id number]

## 2023-12-07 NOTE — CONSULTS
[facility]  Initial Inpatient Consult Note    Referring Provider:     Amina Buchanan MD       Reason for Consultation:     cirrhosis, alcoholic hepatitis, possible EV?       Subjective     History of present illness:    38 y.o. female with a history of TBI, IgA nephropathy, chronic pancreatitis, cirrhosis with esophageal varices, anxiety, hypothyroidism, type 2 diabetes who presented to [facility] complaining of witnessed seizure-like episode.    MELD NA 23 on arrival with Madrey's discriminant function of 52.  Prednisolone was started yesterday per primary team.  Currently on CIWA protocol.    Patient seen and examined at bedside this afternoon with  present.  Majority of history obtained through  as patient is a bad historian.  She reportedly has had traumatic brain injuries in the past.  According to the  [facility] and has been following with a gastroenterologist there for workup and monitoring.  He reports that she was admitted back in August and was in the ICU for 28 days for confusion and fluid on her stomach due to her liver.  He states that at that point they left AMA and she did not finish treatment.  He reports that she was also in the hospital with abdominal pain in October.  He says that she has had EGDs in the past which noted esophageal varices although he is unsure what grade and that she was supposed to have a colonoscopy outpatient but this was never done as she did not want to do the prep.  He says that they came to the hospital today after she had seizure-like activity after not drinking alcohol about a day.  This episode she bit the inside of her mouth which caused her to have bleeding.  He says she also had 2 episodes of vomiting with a bit of blood present shortly after biting side of mouth.  He reports extensive history of alcohol use patient drinks 1/2 gallon of  vodka a day.  According to the  she has also had diarrhea for about 6 months.  Denies any melena or hematochezia.  He reports that she is supposed to be taking Xifaxan and lactulose at home but is noncompliant with these medications.  He states that the only medicine that she will take is her thyroid medication as she is afraid of gaining weight.  He says due to her fear of gaining weight she also refuses to eat a lot of the time.     Labs today show alk phos 248, , ALT 45, T. bili 7.7 predominantly direct; these are slightly improved from yesterday.  On arrival, PT 21.3, INR 1.1.  Noted to have low platelets at 28.  Hemoglobin 11.6 yesterday, 10.0 today.    Past Medical History:  Past Medical History:   Diagnosis Date    Alcohol abuse with alcohol-induced anxiety disorder     Anxiety     Depression     Disease of thyroid gland     Status post alcohol detoxification      Past Surgical History:  Past Surgical History:   Procedure Laterality Date     SECTION      x2    TONSILLECTOMY        Social History:   Social History     Tobacco Use    Smoking status: Never    Smokeless tobacco: Not on file   Substance Use Topics    Alcohol use: Yes     Comment: drinks vodka daily up to 1/2 fifth, last drink 10/30/16      Family History:  Family History   Problem Relation Age of Onset    Alcohol abuse Father     Drug abuse Father     Alcohol abuse Maternal Grandfather     Depression Maternal Grandmother     Drug abuse Maternal Grandmother     Alcohol abuse Paternal Grandfather        Home Meds:  Medications Prior to Admission   Medication Sig Dispense Refill Last Dose    levothyroxine (SYNTHROID, LEVOTHROID) 100 MCG tablet Take  by mouth Daily.   2023    chlordiazePOXIDE (LIBRIUM) 10 MG capsule 1 po tid for 3 days the bid for 3 days then daily for 3 days. 18 capsule 0 Unknown    insulin glargine (LANTUS, SEMGLEE) 100 UNIT/ML injection Inject 10 Units under the skin into the appropriate area as directed  Every Night.       pantoprazole (PROTONIX) 40 MG EC tablet Take 1 tablet by mouth Daily. 30 tablet 0 Unknown     Current Meds:   folic acid, 1 mg, Oral, Daily  insulin glargine, 10 Units, Subcutaneous, Nightly  insulin lispro, 2-7 Units, Subcutaneous, 4x Daily AC & at Bedtime  levothyroxine, 100 mcg, Oral, Q AM  pantoprazole, 40 mg, Oral, Daily  prednisoLONE sodium phosphate, 40 mg, Oral, Daily  sodium chloride, 10 mL, Intravenous, Q12H  thiamine (B-1) IV, 200 mg, Intravenous, Q8H   Followed by  [START ON 12/12/2023] thiamine, 100 mg, Oral, Daily      Allergies:  No Known Allergies      Objective     Vital Signs  Temp:  [98.2 °F (36.8 °C)-100.2 °F (37.9 °C)] 98.2 °F (36.8 °C)  Heart Rate:  [] 106  Resp:  [18] 18  BP: (108-145)/(77-97) 108/83    Physical Exam:   General: patient awake, alert and cooperative   Eyes: Normal lids and lashes, scleral icterus   Neck: supple, normal ROM   Skin: warm and dry, jaundiced   Cardiovascular: regular rhythm, tachycardic   Pulm: clear to auscultation bilaterally, regular and unlabored   Abdomen: soft, right upper quadrant tenderness, nondistended; normal bowel sounds    Results Review:   I reviewed the patient's new clinical results.    Results from last 7 days   Lab Units 12/07/23  0801 12/06/23  1722   WBC 10*3/mm3 5.81 3.12*   HEMOGLOBIN g/dL 10.0* 11.6*   HEMATOCRIT % 28.8* 33.8*   PLATELETS 10*3/mm3 25* 28*     Results from last 7 days   Lab Units 12/07/23  0801 12/06/23  1722   SODIUM mmol/L 133* 136   POTASSIUM mmol/L 4.2  4.2 2.9*   CHLORIDE mmol/L 100 94*   CO2 mmol/L 22.7 28.0   BUN mg/dL 7 7   CREATININE mg/dL 0.43* 0.47*   CALCIUM mg/dL 8.2* 9.1   BILIRUBIN mg/dL 7.7* 8.4*   ALK PHOS U/L 248* 300*   ALT (SGPT) U/L 45* 54*   AST (SGOT) U/L 216* 276*   GLUCOSE mg/dL 304* 65     Results from last 7 days   Lab Units 12/06/23  1722   INR  1.81*     Lab Results   Lab Value Date/Time    LIPASE 43 12/03/2016 0657    LIPASE 51 12/02/2016 0647    LIPASE 62 (H) 12/01/2016  0753    LIPASE 87 (H) 11/30/2016 1612     Radiology:  No orders to display       Assessment & Plan   Active Hospital Problems    Diagnosis     **Alcohol withdrawal seizure     Severe malnutrition     Alcoholic hepatitis     Alcoholic cirrhosis     Esophageal varices     Pancytopenia     Hypomagnesemia     Hypokalemia     Diabetes mellitus     Anxiety     Depression        Assessment:  Decompensated cirrhosis with reported history of ascites and hepatic encephalopathy  Reported history of esophageal varices  Hematemesis  Alcohol abuse  Withdrawals    Plan:  On steroids for alcoholic hepatitis - agree with this - monitor LFT's. Will plan to check Lille score in 7 days  CT Abd/Pelvis w/ contrast ordered to assess liver, for abd pain and diarrhea - await results  Xifaxan 550 tid  Continue pantoprazole 40mg IV bid  AFP, PT/INR ordered and pending  Nutrition consult given patient's hesitancy to eat due to not wanting to gain weight. Discussed importance of nutrition with healing today with patient.   Reported episode of hematemesis after patient bit lip during seizure like episode. This could be due to swallowing blood, gastritis, ulcer? Patient would ultimately benefit from EGD this admission, but will need optimization with regards to platelets first. It would also be best that she is done with withdrawals. If Hgb continues to drop or if she has further episodes of GI bleeding we can consider doing it sooner.   Advised alcohol cessation    Patient and plan of care discussed with attending, Dr. Petra Gonsalves PA-C

## 2023-12-07 NOTE — PLAN OF CARE
Goal Outcome Evaluation:  Plan of Care Reviewed With: patient           Outcome Evaluation: Pt is a 39 y/o F admitted to [facility] after witnessed seizure-like activity most likely due to alcohol withdrawal. Pt has a past med hx of alcoholic hepatitis, TBI, IgA nephropathy, chronic pancreatitis, cirrhosis with esophageal varices, anxiety, hypothyroidism,and DM. Pt reports she lives with her spouse with 5 JOLENE and resides on 2nd floor of home. Pt denies any use of DME at [facility]. Pt reports 3-4 falls per week. Pt presents to PT with generalized weakness, decreased endurance, and impaired balance. Pt completed bed mobility and STS transfer with SBA. Pt ambulated 200' c RW requiring CGA/min A. Pt demo's a slow and unsteady gait with multiple LOB. Pt veers L with poor peripheral vision requiring assist to manage RW at times. Pt encouraged to call for assist. Pt will need 24/7 assist at D/C. PT recommends outpatient PT services at D/C pending progress.      Anticipated Discharge Disposition (PT): home with 24/7 care, home with outpatient therapy services

## 2023-12-07 NOTE — H&P (VIEW-ONLY)
[facility]  Initial Inpatient Consult Note    Referring Provider:     Amina Buchanan MD       Reason for Consultation:     cirrhosis, alcoholic hepatitis, possible EV?       Subjective     History of present illness:    38 y.o. female with a history of TBI, IgA nephropathy, chronic pancreatitis, cirrhosis with esophageal varices, anxiety, hypothyroidism, type 2 diabetes who presented to [facility] yesterday complaining of witnessed seizure-like episode.    MELD NA 23 on arrival with Madrey's discriminant function of 52.  Prednisolone was started yesterday per primary team.  Currently on CIWA protocol.    Patient seen and examined at bedside this afternoon with  present.  Majority of history obtained through  as patient is a bad historian.  She reportedly has had traumatic brain injuries in the past.  According to the  patient [facility] and has been following with a gastroenterologist there for workup and monitoring.  He reports that she was admitted back in August and was in the ICU for 28 days for confusion and fluid on her stomach due to her liver.  He states that at that point they left AMA and she did not finish treatment.  He reports that she was also in the hospital with abdominal pain in October.  He says that she has had EGDs in the past which noted esophageal varices although he is unsure what grade and that she was supposed to have a colonoscopy outpatient but this was never done as she did not want to do the prep.  He says that they came to the hospital today after she had seizure-like activity after not drinking alcohol about a day.  This episode she bit the inside of her mouth which caused her to have bleeding.  He says she also had 2 episodes of vomiting with a bit of blood present shortly after biting side of mouth.  He reports extensive history of alcohol use patient drinks 1/2 gallon of  vodka a day.  According to the  she has also had diarrhea for about 6 months.  Denies any melena or hematochezia.  He reports that she is supposed to be taking Xifaxan and lactulose at home but is noncompliant with these medications.  He states that the only medicine that she will take is her thyroid medication as she is afraid of gaining weight.  He says due to her fear of gaining weight she also refuses to eat a lot of the time.     Labs today show alk phos 248, , ALT 45, T. bili 7.7 predominantly direct; these are slightly improved from yesterday.  On arrival, PT 21.3, INR 1.1.  Noted to have low platelets at 28.  Hemoglobin 11.6 yesterday, 10.0 today.    Past Medical History:  Past Medical History:   Diagnosis Date    Alcohol abuse with alcohol-induced anxiety disorder     Anxiety     Depression     Disease of thyroid gland     Status post alcohol detoxification      Past Surgical History:  Past Surgical History:   Procedure Laterality Date     SECTION      x2    TONSILLECTOMY        Social History:   Social History     Tobacco Use    Smoking status: Never    Smokeless tobacco: Not on file   Substance Use Topics    Alcohol use: Yes     Comment: drinks vodka daily up to 1/2 fifth, last drink 10/30/16      Family History:  Family History   Problem Relation Age of Onset    Alcohol abuse Father     Drug abuse Father     Alcohol abuse Maternal Grandfather     Depression Maternal Grandmother     Drug abuse Maternal Grandmother     Alcohol abuse Paternal Grandfather        Home Meds:  Medications Prior to Admission   Medication Sig Dispense Refill Last Dose    levothyroxine (SYNTHROID, LEVOTHROID) 100 MCG tablet Take  by mouth Daily.   2023    chlordiazePOXIDE (LIBRIUM) 10 MG capsule 1 po tid for 3 days the bid for 3 days then daily for 3 days. 18 capsule 0 Unknown    insulin glargine (LANTUS, SEMGLEE) 100 UNIT/ML injection Inject 10 Units under the skin into the appropriate area as directed  Every Night.       pantoprazole (PROTONIX) 40 MG EC tablet Take 1 tablet by mouth Daily. 30 tablet 0 Unknown     Current Meds:   folic acid, 1 mg, Oral, Daily  insulin glargine, 10 Units, Subcutaneous, Nightly  insulin lispro, 2-7 Units, Subcutaneous, 4x Daily AC & at Bedtime  levothyroxine, 100 mcg, Oral, Q AM  pantoprazole, 40 mg, Oral, Daily  prednisoLONE sodium phosphate, 40 mg, Oral, Daily  sodium chloride, 10 mL, Intravenous, Q12H  thiamine (B-1) IV, 200 mg, Intravenous, Q8H   Followed by  [START ON 12/12/2023] thiamine, 100 mg, Oral, Daily      Allergies:  No Known Allergies      Objective     Vital Signs  Temp:  [98.2 °F (36.8 °C)-100.2 °F (37.9 °C)] 98.2 °F (36.8 °C)  Heart Rate:  [] 106  Resp:  [18] 18  BP: (108-145)/(77-97) 108/83    Physical Exam:   General: patient awake, alert and cooperative   Eyes: Normal lids and lashes, scleral icterus   Neck: supple, normal ROM   Skin: warm and dry, jaundiced   Cardiovascular: regular rhythm, tachycardic   Pulm: clear to auscultation bilaterally, regular and unlabored   Abdomen: soft, right upper quadrant tenderness, nondistended; normal bowel sounds    Results Review:   I reviewed the patient's new clinical results.    Results from last 7 days   Lab Units 12/07/23  0801 12/06/23  1722   WBC 10*3/mm3 5.81 3.12*   HEMOGLOBIN g/dL 10.0* 11.6*   HEMATOCRIT % 28.8* 33.8*   PLATELETS 10*3/mm3 25* 28*     Results from last 7 days   Lab Units 12/07/23  0801 12/06/23  1722   SODIUM mmol/L 133* 136   POTASSIUM mmol/L 4.2  4.2 2.9*   CHLORIDE mmol/L 100 94*   CO2 mmol/L 22.7 28.0   BUN mg/dL 7 7   CREATININE mg/dL 0.43* 0.47*   CALCIUM mg/dL 8.2* 9.1   BILIRUBIN mg/dL 7.7* 8.4*   ALK PHOS U/L 248* 300*   ALT (SGPT) U/L 45* 54*   AST (SGOT) U/L 216* 276*   GLUCOSE mg/dL 304* 65     Results from last 7 days   Lab Units 12/06/23  1722   INR  1.81*     Lab Results   Lab Value Date/Time    LIPASE 43 12/03/2016 0657    LIPASE 51 12/02/2016 0647    LIPASE 62 (H) 12/01/2016  0753    LIPASE 87 (H) 11/30/2016 1612     Radiology:  No orders to display       Assessment & Plan   Active Hospital Problems    Diagnosis     **Alcohol withdrawal seizure     Severe malnutrition     Alcoholic hepatitis     Alcoholic cirrhosis     Esophageal varices     Pancytopenia     Hypomagnesemia     Hypokalemia     Diabetes mellitus     Anxiety     Depression        Assessment:  Decompensated cirrhosis with reported history of ascites and hepatic encephalopathy  Reported history of esophageal varices  Hematemesis  Alcohol abuse  Withdrawals    Plan:  On steroids for alcoholic hepatitis - agree with this - monitor LFT's. Will plan to check Lille score in 7 days  CT Abd/Pelvis w/ contrast ordered to assess liver, for abd pain and diarrhea - await results  Xifaxan 550 tid  Continue pantoprazole 40mg IV bid  AFP, PT/INR ordered and pending  Nutrition consult given patient's hesitancy to eat due to not wanting to gain weight. Discussed importance of nutrition with healing today with patient.   Reported episode of hematemesis after patient bit lip during seizure like episode. This could be due to swallowing blood, gastritis, ulcer? Patient would ultimately benefit from EGD this admission, but will need optimization with regards to platelets first. It would also be best that she is done with withdrawals. If Hgb continues to drop or if she has further episodes of GI bleeding we can consider doing it sooner.   Advised alcohol cessation    Patient and plan of care discussed with attending, Dr. Petra Gonsalves PA-C

## 2023-12-07 NOTE — H&P
"    Patient Name:  Corinna Almonte  YOB: 1985  MRN:  [id number]  Admit Date:  12/6/2023  Patient Care Team:  System, Provider Not In as PCP - General  Provider, No Known      Subjective   History Present Illness     Chief Complaint   Patient presents with    Seizures       Ms. Almonte is a 38 y.o. non-smoker with a history of TBI, IgA nephropathy, chronic pancreatitis, cirrhosis with esophageal varices, anxiety, hypothyroidism, type 2 diabetes that presents to [facility] complaining of witnessed seizure-like episode.    History of Present Illness  38-year-old female with history as above who presents with witnessed seizure-like episode.  History is collected from patient's  who is at bedside.  The patient receives most of her medical care at the [facility] so there are minimal records available.  Patient's  reports they were laying in bed this evening with the patient asleep on her side when the patient put her arms above her head and started screaming unintelligibly.  He also reports that she had her jaw clenched shut and she bit her tongue.  Episode continued for approximately 3 to 5 minutes.  Immediately afterwards the patient seemed \"out of it\" and did not remember the episode.  911 was called and the patient was brought to [facility].  Patient's  reports that she drinks approximately 1/2 gallon of liquor a day but quit drinking 2 days ago.  The patient does not add much to the history,  reports that she has a TBI and has poor memory.  The patient's eyes are jaundiced, has been reports that started about a week and a half ago.    Review of Systems   Constitutional:  Positive for fatigue. Negative for fever.   Respiratory:  Negative for cough and shortness of breath.    Cardiovascular:  Negative for chest pain, palpitations and leg swelling.   Gastrointestinal:  Positive for abdominal pain, nausea and vomiting.        Hematemesis   Neurological: "  Positive for weakness.        Personal History     Past Medical History:   Diagnosis Date    Alcohol abuse with alcohol-induced anxiety disorder     Anxiety     Depression     Disease of thyroid gland     Status post alcohol detoxification      Past Surgical History:   Procedure Laterality Date     SECTION      x2    TONSILLECTOMY       Family History   Problem Relation Age of Onset    Alcohol abuse Father     Drug abuse Father     Alcohol abuse Maternal Grandfather     Depression Maternal Grandmother     Drug abuse Maternal Grandmother     Alcohol abuse Paternal Grandfather      Social History     Tobacco Use    Smoking status: Never   Vaping Use    Vaping Use: Never used   Substance Use Topics    Alcohol use: Yes     Comment: drinks vodka daily up to 1/2 fifth, last drink 10/30/16    Drug use: Defer     No current facility-administered medications on file prior to encounter.     Current Outpatient Medications on File Prior to Encounter   Medication Sig Dispense Refill    levothyroxine (SYNTHROID, LEVOTHROID) 100 MCG tablet Take  by mouth Daily.      chlordiazePOXIDE (LIBRIUM) 10 MG capsule 1 po tid for 3 days the bid for 3 days then daily for 3 days. 18 capsule 0    insulin glargine (LANTUS, SEMGLEE) 100 UNIT/ML injection Inject 10 Units under the skin into the appropriate area as directed Every Night.      pantoprazole (PROTONIX) 40 MG EC tablet Take 1 tablet by mouth Daily. 30 tablet 0     No Known Allergies    Objective    Objective     Vital Signs  Temp:  [98.8 °F (37.1 °C)-99 °F (37.2 °C)] 99 °F (37.2 °C)  Heart Rate:  [105-119] 111  Resp:  [18] 18  BP: (126-145)/(86-97) 126/86  SpO2:  [96 %-100 %] 100 %  on   ;   Device (Oxygen Therapy): room air  Body mass index is 20.25 kg/m².    Physical Exam  Constitutional:       General: She is not in acute distress.     Appearance: She is normal weight. She is ill-appearing.      Comments: Patient seems dazed, looks to her  for help with questions when  asked directly   HENT:      Head: Normocephalic and atraumatic.      Nose: Nose normal.   Eyes:      General: Scleral icterus present.      Extraocular Movements: Extraocular movements intact.      Conjunctiva/sclera: Conjunctivae normal.      Pupils: Pupils are equal, round, and reactive to light.   Cardiovascular:      Rate and Rhythm: Normal rate and regular rhythm.      Pulses: Normal pulses.      Heart sounds: No murmur heard.  Pulmonary:      Effort: Pulmonary effort is normal. No respiratory distress.      Breath sounds: Normal breath sounds. No wheezing.   Abdominal:      General: Abdomen is flat. Bowel sounds are normal. There is no distension.      Palpations: Abdomen is soft.      Tenderness: There is abdominal tenderness (mild epigastric tenderness).   Musculoskeletal:         General: Tenderness present. No swelling.      Cervical back: Normal range of motion and neck supple.      Right lower leg: No edema.      Left lower leg: No edema.      Comments: Right shin bruise   Skin:     General: Skin is warm and dry.   Neurological:      General: No focal deficit present.      Mental Status: She is alert and oriented to person, place, and time. Mental status is at baseline.   Psychiatric:      Comments: Mood/affect is flat         Results Review:  I reviewed the patient's new clinical results.  I reviewed the patient's new imaging results and agree with the interpretation.  I reviewed the patient's other test results and agree with the interpretation  I personally viewed and interpreted the patient's EKG/Telemetry data  Discussed with ED provider.    Lab Results (last 24 hours)       Procedure Component Value Units Date/Time    CBC & Differential [[id number]]  (Abnormal) Collected: 12/06/23 1722    Specimen: Blood from Arm, Right Updated: 12/06/23 1822    Narrative:      The following orders were created for panel order CBC & Differential.  Procedure                               Abnormality         Status                      ---------                               -----------         ------                     CBC Auto Differential[[id number]]        Abnormal            Final result                 Please view results for these tests on the individual orders.    Comprehensive Metabolic Panel [[id number]]  (Abnormal) Collected: 12/06/23 1722    Specimen: Blood from Arm, Right Updated: 12/06/23 1803     Glucose 65 mg/dL      BUN 7 mg/dL      Creatinine 0.47 mg/dL      Sodium 136 mmol/L      Potassium 2.9 mmol/L      Chloride 94 mmol/L      CO2 28.0 mmol/L      Calcium 9.1 mg/dL      Total Protein 7.1 g/dL      Albumin 4.2 g/dL      ALT (SGPT) 54 U/L      AST (SGOT) 276 U/L      Alkaline Phosphatase 300 U/L      Total Bilirubin 8.4 mg/dL      Globulin 2.9 gm/dL      A/G Ratio 1.4 g/dL      BUN/Creatinine Ratio 14.9     Anion Gap 14.0 mmol/L      eGFR 124.4 mL/min/1.73     Narrative:      GFR Normal >60  Chronic Kidney Disease <60  Kidney Failure <15      Protime-INR [[id number]]  (Abnormal) Collected: 12/06/23 1722    Specimen: Blood from Arm, Right Updated: 12/06/23 1745     Protime 21.3 Seconds      INR 1.81    hCG, Serum, Qualitative [[id number]]  (Normal) Collected: 12/06/23 1722    Specimen: Blood from Arm, Right Updated: 12/06/23 1749     HCG Qualitative Negative    Ethanol [[id number]] Collected: 12/06/23 1722    Specimen: Blood from Arm, Right Updated: 12/06/23 1800     Ethanol <10 mg/dL      Ethanol % <0.010 %     Ammonia [[id number]]  (Normal) Collected: 12/06/23 1722    Specimen: Blood from Arm, Right Updated: 12/06/23 1751     Ammonia 33 umol/L     CK [[id number]]  (Normal) Collected: 12/06/23 1722    Specimen: Blood from Arm, Right Updated: 12/06/23 1800     Creatine Kinase 92 U/L     Magnesium [[id number]]  (Abnormal) Collected: 12/06/23 1722    Specimen: Blood from Arm, Right Updated: 12/06/23 1803     Magnesium 1.0 mg/dL     CBC Auto Differential [[id number]]  (Abnormal) Collected: 12/06/23 4308    Specimen: Blood  from Arm, Right Updated: 12/06/23 1822     WBC 3.12 10*3/mm3      RBC 3.76 10*6/mm3      Hemoglobin 11.6 g/dL      Hematocrit 33.8 %      MCV 89.9 fL      MCH 30.9 pg      MCHC 34.3 g/dL      RDW 14.9 %      RDW-SD 49.0 fl      MPV 12.5 fL      Platelets 28 10*3/mm3      Neutrophil % 76.0 %      Lymphocyte % 14.4 %      Monocyte % 8.0 %      Eosinophil % 0.0 %      Basophil % 1.0 %      Neutrophils, Absolute 2.37 10*3/mm3      Lymphocytes, Absolute 0.45 10*3/mm3      Monocytes, Absolute 0.25 10*3/mm3      Eosinophils, Absolute 0.00 10*3/mm3      Basophils, Absolute 0.03 10*3/mm3     Urinalysis With Microscopic If Indicated (No Culture) - Urine, Clean Catch [[id number]]  (Abnormal) Collected: 12/06/23 1858    Specimen: Urine, Clean Catch Updated: 12/06/23 1945     Color, UA Dark Yellow     Appearance, UA Cloudy     pH, UA 8.5     Specific Gravity, UA 1.012     Glucose, UA Negative     Ketones, UA Trace     Bilirubin, UA Negative     Blood, UA Negative     Protein, UA Trace     Leuk Esterase, UA Negative     Nitrite, UA Negative     Urobilinogen, UA 2.0 E.U./dL    Narrative:      Urine microscopic not indicated.    Urine Drug Screen - Urine, Clean Catch [[id number]]  (Normal) Collected: 12/06/23 1858    Specimen: Urine, Clean Catch Updated: 12/06/23 2000     Amphet/Methamphet, Screen Negative     Barbiturates Screen, Urine Negative     Benzodiazepine Screen, Urine Negative     Cocaine Screen, Urine Negative     Opiate Screen Negative     THC, Screen, Urine Negative     Methadone Screen, Urine Negative     Oxycodone Screen, Urine Negative     Fentanyl, Urine Negative    Narrative:      Negative Thresholds Per Drugs Screened:    Amphetamines                 500 ng/ml  Barbiturates                 200 ng/ml  Benzodiazepines              100 ng/ml  Cocaine                      300 ng/ml  Methadone                    300 ng/ml  Opiates                      300 ng/ml  Oxycodone                    100 ng/ml  THC                 "           50 ng/ml  Fentanyl                       5 ng/ml      The Normal Value for all drugs tested is negative. This report includes final unconfirmed screening results to be used for medical treatment purposes only. Unconfirmed results must not be used for non-medical purposes such as employment or legal testing. Clinical consideration should be applied to any drug of abuse test, particularly when unconfirmed results are used.                    Imaging Results (Last 24 Hours)       ** No results found for the last 24 hours. **                SCANNED - TELEMETRY     Final Result           Assessment/Plan     Active Hospital Problems    Diagnosis  POA    **Alcohol withdrawal seizure [F10.939, R56.9]  Yes    Alcoholic hepatitis [K70.10]  Unknown    Alcoholic cirrhosis [K70.30]  Unknown    Esophageal varices [I85.00]  Unknown    Pancytopenia [D61.818]  Unknown    Hypomagnesemia [E83.42]  Unknown    Hypokalemia [E87.6]  Unknown    Diabetes mellitus [E11.9]  Unknown    Anxiety [F41.9]  Unknown    Depression [F32.A]  Unknown      Resolved Hospital Problems   No resolved problems to display.       Ms. Almonte is a 38 y.o. non-smoker with a history of cirrhosis, alcohol use disorder, type 2 DM, anxiety/depression who presents with seizure-like episode     Alcoholic hepatitis  Cirrhosis with history of esophageal varices  -  reports h/o EV- with questionable minimal hematemesis the last several days;   - Tbili 8.4, AST::54; INR 1.8, ammonia 33  - MELD-Na 23, Maddrey's DF of 52  - start prednisolone 40mg daily for MDF of >32, GI consult for additional recommendations  - [facility] ([facility], previous EGD reports)     Alcohol use disorder with withdrawal/seizure  - description of patient throwing hands over head and yelling doesn't sound like a classic generalized tonic-clonic seizure but patient is described as being \"out of it\" for a while after event which sounds " post-ictal  - pt drinks 1/2 gal a day, last drink 2 days ago; reports h/o withdrawal syndrome  - Start CIWA protocol with thiamine, folate, prn benzodiazepines  - considered phenobarb but given hepatic metabolism- will hold off    Pancytopenia  - Plt 28, no e/o bleeding, no indication for transfusion at this time  - hgb 11.6  - most likely sequelae of EtOh use but will ask hematology to see  - daily CBC    Hypomagnesemia  Hypokalemia  - replace per protocol    Hypothyroidism  - continue synthroid    Type 2 DM  -  reports giving patient 10U TIDAC of short-acting insulin and 30U qAM of long acting insulin    Anxiety/depression    I discussed the patient's findings and my recommendations with patient, spouse, and ED provider.    VTE Prophylaxis - SCDs.  Code Status - Full code.       Amina Buchanan MD  [facility]  12/06/23  22:23 EST

## 2023-12-07 NOTE — PLAN OF CARE
The pt was admitted to [facility] 2/2 to seizure activity. Dx also includes ETOH withdrawal, alcohol cirrhosis. Pmhx significant for TBI, chronic pancreatitis, anxiety, DM2, and ETOH abuse. The pt lives with her  who works full time. 3-4 weekly falls reported. The pt was oriented x3 this morning. SBA bed mobility. SBA to stand and CGA/Min A + walker for OOB mobility to her bathroom. Impulsive movements present during ADL's. She has L visual inattention and verbal/tactile cues provided to safely assist her around household surfaces. Education provided on safety and calling for nursing prior to OOB standing. Will continue to follow. Anticipate the need for 24/7 assistance at d/c.

## 2023-12-07 NOTE — PLAN OF CARE
Problem: Skin Injury Risk Increased  Goal: Skin Health and Integrity  Outcome: Ongoing, Progressing     Problem: Diabetes Comorbidity  Goal: Blood Glucose Level Within Targeted Range  Outcome: Ongoing, Progressing     Problem: Hypertension Comorbidity  Goal: Blood Pressure in Desired Range  Outcome: Ongoing, Progressing  Intervention: Maintain Blood Pressure Management  Recent Flowsheet Documentation  Taken 12/7/2023 0415 by Greer Skaggs, RN  Medication Review/Management: medications reviewed  Taken 12/7/2023 0206 by Greer Skaggs, RN  Medication Review/Management: medications reviewed  Taken 12/7/2023 0017 by Greer Skaggs, RN  Medication Review/Management: medications reviewed  Taken 12/6/2023 2231 by Greer Skaggs, RN  Medication Review/Management: medications reviewed  Taken 12/6/2023 2052 by Greer Skaggs, RN  Medication Review/Management: medications reviewed  Taken 12/6/2023 2031 by Greer Skaggs, RN  Medication Review/Management: medications reviewed     Problem: Seizure Disorder Comorbidity  Goal: Maintenance of Seizure Control  Outcome: Ongoing, Progressing   Goal Outcome Evaluation:

## 2023-12-07 NOTE — PROGRESS NOTES
"Nutrition Services    Patient Name:  Corinna Almonte  YOB: 1985  MRN: [id number]  Admit Date:  2023    Assessment Date:  23    Summary:     Pt is a 38 y.o. female adm for alcohol withdrawal seizure. H/o alcoholic hepatitis/cirrhosis, esophageal varices, T2DM, polysubstance overdose. Nutrition assessment completed. Visited pt at bedside. She endorses decreased appetite since her mom passed away. Has noticed 160 lb wt loss x 1.5 yrs. Agreeable to try Boost Glucose Control. Typically drinks about a half gallon a day per RN note. NFPE completed, severe malnutrition.   Labs: Na 133, Glu 304, Creat 0.43, A1C 7.90, Platelets 25, Phos 2.4, Mg 2.7, ALT 45, Total Bilirubin 7.7  Meds: IVFs, protonix, thiamine     Pt meets ASPEN/AND criteria for nutrition dx of severe malnutrition of chronic disease related to muscle wasting, fat loss, and reported wt loss.    REC:  Boost Glucose Control fidel or vanilla BID B/D for additional calories/protein and to support adequate PO intake   Will continue to encourage and monitor PO/ONS intake   Monitor and replace electrolytes PRN     CLINICAL NUTRITION ASSESSMENT      Reason for Assessment MST score 2+     Diagnosis/Problem   Alcohol withdrawal seizure    Medical/Surgical History Past Medical History:   Diagnosis Date    Alcohol abuse with alcohol-induced anxiety disorder     Anxiety     Depression     Disease of thyroid gland     Status post alcohol detoxification        Past Surgical History:   Procedure Laterality Date     SECTION      x2    TONSILLECTOMY          Anthropometrics        Current Height  Current Weight  BMI kg/m2 Height: 162.6 cm (64\")  Weight: 53.5 kg (118 lb) (23)  Body mass index is 20.25 kg/m².   Adjusted BMI (if applicable)    BMI Category Normal/Healthy (18.4 - 24.9)   Ideal Body Weight (IBW) 54.7 kg    Usual Body Weight (UBW) 230 lbs per pt    Weight Trend Loss, Amount/Timeframe: pt endorses 160 lb wt loss x 1.5 yrs " "  Weight History Wt Readings from Last 30 Encounters:   12/06/23 1858 53.5 kg (118 lb)   12/03/16 0538 78.3 kg (172 lb 11.2 oz)   12/02/16 0849 79.4 kg (175 lb)   12/01/16 1455 74.8 kg (164 lb 14.5 oz)   11/30/16 1554 74.8 kg (165 lb)        Estimated Requirements         Weight used  53.5 kg     Calories  0536-1902 (30 kcal/kg, 35 kcal/kg)    Protein  64-80 (1.2 - 1.5 gm/kg)    Fluid  1 mL/kcal       Labs       Pertinent Labs    Results from last 7 days   Lab Units 12/07/23  0801 12/06/23  1722   SODIUM mmol/L 133* 136   POTASSIUM mmol/L 4.2  4.2 2.9*   CHLORIDE mmol/L 100 94*   CO2 mmol/L 22.7 28.0   BUN mg/dL 7 7   CREATININE mg/dL 0.43* 0.47*   CALCIUM mg/dL 8.2* 9.1   BILIRUBIN mg/dL 7.7* 8.4*   ALK PHOS U/L 248* 300*   ALT (SGPT) U/L 45* 54*   AST (SGOT) U/L 216* 276*   GLUCOSE mg/dL 304* 65     Results from last 7 days   Lab Units 12/07/23  0801 12/06/23  2343 12/06/23  1722   MAGNESIUM mg/dL 2.7* 2.0 1.0*   PHOSPHORUS mg/dL 2.4*  --   --    HEMOGLOBIN g/dL 10.0*  --  11.6*   HEMATOCRIT % 28.8*  --  33.8*   WBC 10*3/mm3 5.81  --  3.12*   ALBUMIN g/dL 3.3*  --  4.2     Results from last 7 days   Lab Units 12/07/23  0801 12/06/23  1722   INR   --  1.81*   PLATELETS 10*3/mm3 25* 28*     No results found for: \"COVID19\"  Lab Results   Component Value Date    HGBA1C 7.90 (H) 12/07/2023          Medications           Scheduled Medications folic acid, 1 mg, Oral, Daily  insulin lispro, 2-7 Units, Subcutaneous, 4x Daily AC & at Bedtime  levothyroxine, 100 mcg, Oral, Q AM  pantoprazole, 40 mg, Oral, Daily  prednisoLONE sodium phosphate, 40 mg, Oral, Daily  sodium chloride, 10 mL, Intravenous, Q12H  thiamine (B-1) IV, 200 mg, Intravenous, Q8H   Followed by  [START ON 12/12/2023] thiamine, 100 mg, Oral, Daily       Infusions sodium chloride, 75 mL/hr, Last Rate: 75 mL/hr (12/07/23 0056)       PRN Medications   acetaminophen **OR** acetaminophen **OR** acetaminophen    senna-docusate sodium **AND** polyethylene glycol " **AND** bisacodyl **AND** bisacodyl    dextrose    dextrose    glucagon (human recombinant)    LORazepam **OR** midazolam **OR** LORazepam **OR** midazolam **OR** midazolam **OR** midazolam    Magnesium Standard Dose Replacement - Follow Nurse / BPA Driven Protocol    melatonin    ondansetron **OR** ondansetron    Potassium Replacement - Follow Nurse / BPA Driven Protocol    [COMPLETED] Insert Peripheral IV **AND** sodium chloride    sodium chloride    sodium chloride     Physical Findings          General Findings alert, anxious, oriented, room air   Oral/Mouth Cavity WDL   Edema  no edema   Gastrointestinal nausea, vomiting, last bowel movement: pending    Skin  jaundice   Tubes/Drains/Lines none   NFPE See Malnutrition Severity Assessment     Malnutrition Severity Assessment      Patient meets criteria for : (P) Severe Malnutrition (Pt meets ASPEN/AND criteria for nutrition dx of severe malnutrition of chronic disease related to muscle wasting, fat loss, and reported wt loss.)  Malnutrition Type (last 8 hours)       Malnutrition Severity Assessment       Row Name 12/07/23 1020       Malnutrition Severity Assessment    Malnutrition Type Chronic Disease - Related Malnutrition (P)       Row Name 12/07/23 1020       Unintentional Weight Loss     Unintentional Weight Loss  -- (P)   160 lb wt loss x 1.5 yrs per pt      Row Name 12/07/23 1020       Muscle Loss    Loss of Muscle Mass Findings Severe (P)     [place] Region Severe - deep hollowing/scooping, lack of muscle to touch, facial bones well defined (P)     Clavicle Bone Region Severe - protruding prominent bone (P)     Scapular Bone Region Severe - prominent bones, depressions easily visible between ribs, scapula, spine, shoulders (P)     Patellar Region Severe - prominent bone, square looking, very little muscle definition (P)     Anterior Thigh Region Severe - line/depression along thigh, obviously thin (P)     Posterior Calf Region Severe - thin with very little  definition/firmness (P)       Row Name 12/07/23 1020       Fat Loss    Subcutaneous Fat Loss Findings Severe (P)     Orbital Region  Severe - pronounced hollowness/depression, dark circles, loose saggy skin (P)     Upper Arm Region Severe - mostly skin, very little space between folds, fingers touch (P)     Thoracic & Lumbar Region Severe - ribs visible with prominent depressions, iliac crest very prominent (P)       Row Name 12/07/23 1020       Criteria Met (Must meet criteria for severity in at least 2 of these categories: M Wasting, Fat Loss, Fluid, Secondary Signs, Wt. Status, Intake)    Patient meets criteria for  Severe Malnutrition (P)   Pt meets ASPEN/AND criteria for nutrition dx of severe malnutrition of chronic disease related to muscle wasting, fat loss, and reported wt loss.                     Current Nutrition Orders & Evaluation of Intake       Oral Nutrition     Food Allergies NKFA   Current PO Diet Diet: Diabetic Diets; Consistent Carbohydrate; Texture: Regular Texture (IDDSI 7); Fluid Consistency: Thin (IDDSI 0)   Supplement n/a   PO Evaluation     % PO Intake Not yet documented     Factors Affecting Intake: decreased appetite, nausea, vomiting   --  PES STATEMENT / NUTRITION DIAGNOSIS      Nutrition Dx Problem  Problem: Malnutrition (severe)  Etiology: Medical Diagnosis - alcohol withdrawal, alcoholic cirrhosis, esophageal varices and Factors Affecting Nutrition - decreased appetite, nausea, vomiting     Signs/Symptoms: NFPE Results and Unintended Weight Change     NUTRITION INTERVENTION / PLAN OF CARE      Intervention Goal(s) Maintain nutrition status, Establish goals of care, Reduce/improve symptoms, Meet estimated needs, Disease management/therapy, Establish PO intake, Tolerate PO , Accepts oral nutrition supplement, Maintain weight, No significant weight loss, and PO intake goal %: 75%         RD Intervention/Action Interview for preferences, Encourage intake, Continue to monitor, Care plan  reviewed, and Recommend/order: ONS    --      Prescription/Orders:       PO Diet Consistent CHO diet       Supplements Boost Glucose Control chocolate or vanilla BID B/D      Enteral Nutrition       Parenteral Nutrition    New Prescription Ordered? Yes   --      Monitor/Evaluation Per protocol, PO intake, Supplement intake, Pertinent labs, Weight, GI status, Symptoms, POC/GOC   Discharge Plan/Needs Pending clinical course   --    RD to follow per protocol.      Electronically signed by:  Johnna De Jesus Dietitian Intern   12/07/23 09:19 EST

## 2023-12-07 NOTE — NURSING NOTE
Access center note.    Spoke with primary RN. Patient not appropriate at this time. Access will try back with consult when patient able to participate in evaluation.

## 2023-12-07 NOTE — PROGRESS NOTES
Discharge Planning Assessment  [facility]     Patient Name: Corinna Almonte  MRN: [id number]  Today's Date: 12/7/2023    Admit Date: 12/6/2023    Plan: Home with spouse, follow for needs   Discharge Needs Assessment       Row Name 12/07/23 1512       Living Environment    People in Home spouse    Name(s) of People in Home Lorenzo Casper [phone]    Current Living Arrangements home    Primary Care Provided by self    Provides Primary Care For no one    Family Caregiver if Needed spouse    Quality of Family Relationships helpful;supportive;involved    Able to Return to Prior Arrangements yes       Resource/Environmental Concerns    Resource/Environmental Concerns none       Transition Planning    Patient/Family Anticipates Transition to home with family    Transportation Anticipated family or friend will provide       Discharge Needs Assessment    Equipment Currently Used at Home none    Concerns to be Addressed discharge planning    Discharge Coordination/Progress Home                   Discharge Plan       Row Name 12/07/23 1512       Plan    Plan Home with spouse, follow for needs    Patient/Family in Agreement with Plan yes    Plan Comments CCP following to assist with d/c planning. Pt resides with her  in a two level home, uses no DME, and has no HH/SNF history. Pt ambulating 200' CGA with PT. CCP to follow for needs pending clinical course. Lilly Stephens LCSW                  Continued Care and Services - Admitted Since 12/6/2023    Coordination has not been started for this encounter.       Expected Discharge Date and Time       Expected Discharge Date Expected Discharge Time    Dec 10, 2023            Demographic Summary       Row Name 12/07/23 1511       General Information    Admission Type inpatient    Arrived From home    Referral Source admission list    Reason for Consult discharge planning    Preferred Language English                   Functional Status       Row Name 12/07/23 1516        Functional Status    Usual Activity Tolerance good    Current Activity Tolerance good       Functional Status, IADL    Medications independent    Meal Preparation independent    Housekeeping independent    Laundry independent    Shopping independent       Mental Status Summary    Recent Changes in Mental Status/Cognitive Functioning unable to assess                   Psychosocial    No documentation.                  Abuse/Neglect    No documentation.                  Legal    No documentation.                  Substance Abuse    No documentation.                  Patient Forms    No documentation.                     Leonela Stephens LCSW

## 2023-12-07 NOTE — PLAN OF CARE
Problem: Skin Injury Risk Increased  Goal: Skin Health and Integrity  Outcome: Ongoing, Progressing  Intervention: Optimize Skin Protection  Recent Flowsheet Documentation  Taken 12/7/2023 0800 by Walter Bell RN  Head of Bed (HOB) Positioning: HOB elevated     Problem: Diabetes Comorbidity  Goal: Blood Glucose Level Within Targeted Range  Outcome: Ongoing, Progressing     Problem: Hypertension Comorbidity  Goal: Blood Pressure in Desired Range  Outcome: Ongoing, Progressing  Intervention: Maintain Blood Pressure Management  Recent Flowsheet Documentation  Taken 12/7/2023 1800 by Walter Bell RN  Medication Review/Management: medications reviewed  Taken 12/7/2023 1200 by Walter Bell RN  Medication Review/Management: medications reviewed     Problem: Seizure Disorder Comorbidity  Goal: Maintenance of Seizure Control  Outcome: Ongoing, Progressing     Problem: Fall Injury Risk  Goal: Absence of Fall and Fall-Related Injury  Outcome: Ongoing, Progressing  Intervention: Identify and Manage Contributors  Recent Flowsheet Documentation  Taken 12/7/2023 1800 by Walter Bell RN  Medication Review/Management: medications reviewed  Taken 12/7/2023 1200 by Walter Bell RN  Medication Review/Management: medications reviewed  Intervention: Promote Injury-Free Environment  Recent Flowsheet Documentation  Taken 12/7/2023 1800 by Walter Bell RN  Safety Promotion/Fall Prevention:   activity supervised   assistive device/personal items within reach   clutter free environment maintained   elopement precautions   fall prevention program maintained   gait belt   lighting adjusted   mobility aid in reach   muscle strengthening facilitated   nonskid shoes/slippers when out of bed   room organization consistent   safety round/check completed  Taken 12/7/2023 1600 by Walter Bell RN  Safety Promotion/Fall Prevention:   activity supervised   assistive device/personal items within reach   clutter free  environment maintained   elopement precautions   fall prevention program maintained   gait belt   lighting adjusted   mobility aid in reach   muscle strengthening facilitated   nonskid shoes/slippers when out of bed   room organization consistent   safety round/check completed  Taken 12/7/2023 1400 by Walter Bell RN  Safety Promotion/Fall Prevention:   activity supervised   assistive device/personal items within reach   clutter free environment maintained   fall prevention program maintained   gait belt   lighting adjusted   mobility aid in reach   muscle strengthening facilitated   nonskid shoes/slippers when out of bed   room organization consistent   safety round/check completed  Taken 12/7/2023 1200 by Walter Bell RN  Safety Promotion/Fall Prevention:   activity supervised   assistive device/personal items within reach   clutter free environment maintained   fall prevention program maintained   elopement precautions   gait belt   lighting adjusted   mobility aid in reach   muscle strengthening facilitated   nonskid shoes/slippers when out of bed   room organization consistent   safety round/check completed  Taken 12/7/2023 1000 by Walter Bell RN  Safety Promotion/Fall Prevention:   activity supervised   assistive device/personal items within reach   clutter free environment maintained   fall prevention program maintained   gait belt   lighting adjusted   mobility aid in reach   muscle strengthening facilitated   nonskid shoes/slippers when out of bed   room organization consistent   safety round/check completed   elopement precautions  Taken 12/7/2023 0800 by Walter Bell RN  Safety Promotion/Fall Prevention:   activity supervised   assistive device/personal items within reach   clutter free environment maintained   elopement precautions   fall prevention program maintained   gait belt   lighting adjusted   mobility aid in reach   muscle strengthening facilitated   nonskid shoes/slippers  when out of bed   room organization consistent   safety round/check completed   Goal Outcome Evaluation:

## 2023-12-07 NOTE — ED NOTES
"Nursing report ED to floor  Corinna Almonte  38 y.o.  female    HPI :   Chief Complaint   Patient presents with    Seizures       Admitting doctor:   Amina Buchanan MD    Admitting diagnosis:   The primary encounter diagnosis was Seizure. Diagnoses of Alcoholic cirrhosis, unspecified whether ascites present, Hypomagnesemia, Hypokalemia, Alcohol abuse, Alcohol withdrawal syndrome without complication, and Thrombocytopenia were also pertinent to this visit.    Code status:   Current Code Status       Date Active Code Status Order ID Comments User Context       Prior            Allergies:   Patient has no known allergies.    Isolation:   No active isolations    Intake and Output    Intake/Output Summary (Last 24 hours) at 12/6/2023 1936  Last data filed at 12/6/2023 1805  Gross per 24 hour   Intake 1050 ml   Output --   Net 1050 ml       Weight:       12/06/23  1858   Weight: 53.5 kg (118 lb)       Most recent vitals:   Vitals:    12/06/23 1629 12/06/23 1644 12/06/23 1858   BP: 145/95 131/97    Pulse: 119 105    Resp: 18     Temp: 98.8 °F (37.1 °C)     TempSrc: Tympanic     SpO2: 96% 97%    Weight:   53.5 kg (118 lb)   Height:   162.6 cm (64\")       Active LDAs/IV Access:   Lines, Drains & Airways       Active LDAs       Name Placement date Placement time Site Days    Peripheral IV 12/06/23 1723 Right Antecubital 12/06/23  1723  Antecubital  less than 1                    Labs (abnormal labs have a star):   Labs Reviewed   COMPREHENSIVE METABOLIC PANEL - Abnormal; Notable for the following components:       Result Value    Creatinine 0.47 (*)     Potassium 2.9 (*)     Chloride 94 (*)     ALT (SGPT) 54 (*)     AST (SGOT) 276 (*)     Alkaline Phosphatase 300 (*)     Total Bilirubin 8.4 (*)     All other components within normal limits    Narrative:     GFR Normal >60  Chronic Kidney Disease <60  Kidney Failure <15     PROTIME-INR - Abnormal; Notable for the following components:    Protime 21.3 (*)     INR 1.81 (*)  "    All other components within normal limits   MAGNESIUM - Abnormal; Notable for the following components:    Magnesium 1.0 (*)     All other components within normal limits   CBC WITH AUTO DIFFERENTIAL - Abnormal; Notable for the following components:    WBC 3.12 (*)     RBC 3.76 (*)     Hemoglobin 11.6 (*)     Hematocrit 33.8 (*)     MPV 12.5 (*)     Platelets 28 (*)     Lymphocyte % 14.4 (*)     Eosinophil % 0.0 (*)     Lymphocytes, Absolute 0.45 (*)     All other components within normal limits   HCG, SERUM, QUALITATIVE - Normal   AMMONIA - Normal   CK - Normal   ETHANOL   URINALYSIS W/ MICROSCOPIC IF INDICATED (NO CULTURE)   URINE DRUG SCREEN   MAGNESIUM   CBC AND DIFFERENTIAL    Narrative:     The following orders were created for panel order CBC & Differential.  Procedure                               Abnormality         Status                     ---------                               -----------         ------                     CBC Auto Differential[[id number]]        Abnormal            Final result                 Please view results for these tests on the individual orders.       EKG:   No orders to display       Meds given in ED:   Medications   sodium chloride 0.9 % flush 10 mL (has no administration in time range)   Potassium Replacement - Follow Nurse / BPA Driven Protocol (has no administration in time range)   Magnesium Standard Dose Replacement - Follow Nurse / BPA Driven Protocol (has no administration in time range)   magnesium sulfate 2g/50 mL (PREMIX) infusion (2 g Intravenous New Bag 12/6/23 1914)     Followed by   magnesium sulfate 2g/50 mL (PREMIX) infusion (has no administration in time range)   potassium chloride (K-DUR,KLOR-CON) ER tablet 40 mEq (40 mEq Oral Given 12/6/23 1912)   morphine injection 2 mg (has no administration in time range)   LORazepam (ATIVAN) tablet 1 mg (has no administration in time range)   sodium chloride 0.9 % infusion 1,000 mL (0 mL Intravenous Stopped 12/6/23  1240)   folic acid 1 mg in sodium chloride 0.9 % 50 mL IVPB (0 mg Intravenous Stopped 12/6/23 1804)   thiamine (B-1) injection 200 mg (200 mg Intravenous Given 12/6/23 1730)   LORazepam (ATIVAN) tablet 1 mg (1 mg Oral Given 12/6/23 1714)       Imaging results:  No radiology results for the last day    Ambulatory status:   Up w/ assistance     Social issues:   Social History     Socioeconomic History    Marital status:    Tobacco Use    Smoking status: Never   Substance and Sexual Activity    Alcohol use: Yes     Comment: drinks vodka daily up to 1/2 fifth, last drink 10/30/16    Drug use: Defer    Sexual activity: Defer       NIH Stroke Scale:       Breanna Jordan RN  12/06/23 19:36 EST

## 2023-12-07 NOTE — THERAPY EVALUATION
Patient Name: Corinna Amlonte  : 1985    MRN: [id number]                              Today's Date: 2023       Admit Date: 2023    Visit Dx:     ICD-10-CM ICD-9-CM   1. Seizure  R56.9 780.39   2. Alcoholic cirrhosis, unspecified whether ascites present  K70.30 571.2   3. Hypomagnesemia  E83.42 275.2   4. Hypokalemia  E87.6 276.8   5. Alcohol abuse  F10.10 305.00   6. Alcohol withdrawal syndrome without complication  F10.930 291.81   7. Thrombocytopenia  D69.6 287.5     Patient Active Problem List   Diagnosis    Polysubstance overdose    Alcohol withdrawal seizure    Alcoholic hepatitis    Alcoholic cirrhosis    Esophageal varices    Pancytopenia    Hypomagnesemia    Hypokalemia    Diabetes mellitus    Anxiety    Depression     Past Medical History:   Diagnosis Date    Alcohol abuse with alcohol-induced anxiety disorder     Anxiety     Depression     Disease of thyroid gland     Status post alcohol detoxification      Past Surgical History:   Procedure Laterality Date     SECTION      x2    TONSILLECTOMY        General Information       Row Name 23 1014          OT Time and Intention    Document Type evaluation  -RB     Mode of Treatment co-treatment;physical therapy;occupational therapy  -RB       Row Name 23 1014          General Information    Patient Profile Reviewed yes  -RB     Prior Level of Function independent:;ADL's;transfer  3-4 weekly falls,  works full time. pt home alone  -RB     Existing Precautions/Restrictions fall  -RB     Barriers to Rehab medically complex;cognitive status  -RB       Row Name 23 1014          Living Environment    People in Home spouse  -RB       Row Name 23 1014          Home Main Entrance    Number of Stairs, Main Entrance five  -RB     Stair Railings, Main Entrance none  -RB       Row Name 23 1014          Stairs Within Home, Primary    Number of Stairs, Within Home, Primary twelve  -RB     Stair Railings, Within  Home, Primary railings safe and in good condition  -       Row Name 12/07/23 1014          Cognition    Orientation Status (Cognition) oriented to;person;place;time  -       Row Name 12/07/23 1014          Safety Issues, Functional Mobility    Safety Issues Affecting Function (Mobility) safety precautions follow-through/compliance;safety precaution awareness;awareness of need for assistance;insight into deficits/self-awareness;judgment;impulsivity;problem-solving;sequencing abilities  -RB     Impairments Affecting Function (Mobility) balance;cognition;strength;visual/perceptual;endurance/activity tolerance;sensation/sensory awareness  -RB               User Key  (r) = Recorded By, (t) = Taken By, (c) = Cosigned By      Initials Name Provider Type    RB Kimberly Gonzalez OT Occupational Therapist                     Mobility/ADL's       Row Name 12/07/23 1016          Bed Mobility    Bed Mobility supine-sit;sit-supine  -RB     Supine-Sit [place] (Bed Mobility) standby assist;verbal cues  -RB     Sit-Supine [place] (Bed Mobility) standby assist;verbal cues  -       Row Name 12/07/23 1016          Transfers    Transfers sit-stand transfer;stand-sit transfer;toilet transfer  -       Row Name 12/07/23 1016          Sit-Stand Transfer    Sit-Stand [place] (Transfers) standby assist;verbal cues;nonverbal cues (demo/gesture)  -RB     Comment, (Sit-Stand Transfer) impulsive  -       Row Name 12/07/23 1016          Stand-Sit Transfer    Stand-Sit [place] (Transfers) standby assist;1 person assist;verbal cues;nonverbal cues (demo/gesture)  -RB     Comment, (Stand-Sit Transfer) impulsive  -       Row Name 12/07/23 1016          Toilet Transfer    Type (Toilet Transfer) sit-stand;stand-sit  -RB     [place] Level (Toilet Transfer) minimum assist (75% patient effort);verbal cues;nonverbal cues (demo/gesture)  -RB     Comment, (Toilet Transfer) hha, cues for safety, she sat sideways on the  toilet  -       Row Name 12/07/23 1016          Functional Mobility    Functional Mobility- Ind. Level contact guard assist;verbal cues required  -     Functional Mobility- Device walker, front-wheeled  -RB     Functional Mobility- Safety Issues balance decreased during turns;sequencing ability decreased  -       Row Name 12/07/23 1016          Activities of Daily Living    BADL Assessment/Intervention toileting;grooming  -       Row Name 12/07/23 1016          Toileting Assessment/Training    [place] Level (Toileting) toileting skills;moderate assist (50% patient effort);maximum assist (25% patient effort);change pad/brief;perform perineal hygiene  -RB     Position (Toileting) supported sitting;supported standing  -RB     Comment, (Toileting) difficulty with hygiene - inattentive  -       Row Name 12/07/23 1016          Grooming Assessment/Training    [place] Level (Grooming) grooming skills;set up  -RB     Position (Grooming) supported standing  -RB     Comment, (Grooming) hand   -RB               User Key  (r) = Recorded By, (t) = Taken By, (c) = Cosigned By      Initials Name Provider Type    RB Kimberly Gonzalez OT Occupational Therapist                   Obj/Interventions       Row Name 12/07/23 1020          Sensory Assessment (Somatosensory)    Sensory Assessment BLE neuropathy  -[facility] Name 12/07/23 1020          Vision Assessment/Intervention    Vision Assessment Comment L visual inattention - walked into items on her L side throughout the eval  -[facility] Name 12/07/23 1020          Range of Motion Comprehensive    General Range of Motion no range of motion deficits identified  -[facility] Name 12/07/23 1020          Strength Comprehensive (MMT)    Comment, General Manual Muscle Testing (MMT) Assessment Generalized weakness and muscle atrophy in arms and legs  -       Row Name 12/07/23 1020          Balance    Comment, Balance SBA sitting balance, CGA/Min A  standing balance  -RB               User Key  (r) = Recorded By, (t) = Taken By, (c) = Cosigned By      Initials Name Provider Type    RB Kimberly Gonzalez, MICHAEL Occupational Therapist                   Goals/Plan       Row Name 12/07/23 1021          Bed Mobility Goal 1 (OT)    Activity/Assistive Device (Bed Mobility Goal 1, OT) bed mobility activities, all  -RB     [place] Level/Cues Needed (Bed Mobility Goal 1, OT) supervision required  -RB     Time Frame (Bed Mobility Goal 1, OT) short term goal (STG);2 weeks  -RB     Progress/Outcomes (Bed Mobility Goal 1, OT) continuing progress toward goal  -RB       Row Name 12/07/23 1021          Transfer Goal 1 (OT)    Activity/Assistive Device (Transfer Goal 1, OT) transfers, all  -RB     [place] Level/Cues Needed (Transfer Goal 1, OT) supervision required  -RB     Time Frame (Transfer Goal 1, OT) short term goal (STG);2 weeks  -RB     Progress/Outcome (Transfer Goal 1, OT) continuing progress toward goal  -RB       Row Name 12/07/23 1021          Dressing Goal 1 (OT)    Activity/Device (Dressing Goal 1, OT) dressing skills, all  -RB     [place]/Cues Needed (Dressing Goal 1, OT) supervision required  -RB     Time Frame (Dressing Goal 1, OT) short term goal (STG);2 weeks  -RB     Progress/Outcome (Dressing Goal 1, OT) continuing progress toward goal  -RB       Row Name 12/07/23 1021          Toileting Goal 1 (OT)    Activity/Device (Toileting Goal 1, OT) toileting skills, all  -RB     [place] Level/Cues Needed (Toileting Goal 1, OT) supervision required  -RB     Time Frame (Toileting Goal 1, OT) short term goal (STG);2 weeks  -RB     Progress/Outcome (Toileting Goal 1, OT) continuing progress toward goal  -RB       Row Name 12/07/23 1021          Grooming Goal 1 (OT)    Activity/Device (Grooming Goal 1, OT) grooming skills, all  -RB     [place] (Grooming Goal 1, OT) supervision required  -RB     Time Frame (Grooming Goal 1, OT) short term goal  (STG);2 weeks  -RB     Progress/Outcome (Grooming Goal 1, OT) continuing progress toward goal  -RB       Row Name 12/07/23 1021          Therapy Assessment/Plan (OT)    Planned Therapy Interventions (OT) transfer/mobility retraining;strengthening exercise;ROM/therapeutic exercise;BADL retraining;adaptive equipment training;functional balance retraining;occupation/activity based interventions;patient/caregiver education/training;cognitive/visual perception retraining;activity tolerance training  -RB               User Key  (r) = Recorded By, (t) = Taken By, (c) = Cosigned By      Initials Name Provider Type    RB Kimberly Gonzalez OT Occupational Therapist                   Clinical Impression       Row Name 12/07/23 1021          Pain Assessment    Pretreatment Pain Rating 0/10 - no pain  -RB     Posttreatment Pain Rating 0/10 - no pain  -RB       Row Name 12/07/23 1021          Plan of Care Review    Plan of Care Reviewed With patient  -RB     Progress no change  -RB     Outcome Evaluation The pt was admitted to [facility] 2/2 to seizure activity. Dx also includes ETOH withdrawal, alcohol cirrhosis. Pmhx significant for TBI, chronic pancreatitis, anxiety, DM2, and ETOH abuse. The pt lives with her  who works full time. 3-4 weekly falls reported. The pt was oriented x3 this morning. SBA bed mobility. SBA to stand and CGA/Min A + walker for OOB mobility to her bathroom. Impulsive movements present during ADL's. She has L visual inattention and verbal/tactile cues provided to safely assist her around household surfaces. Education provided on safety and calling for nursing prior to OOB standing. Will continue to follow. Anticipate the need for 24/7 assistance at d/c.  -RB       Row Name 12/07/23 1021          Therapy Assessment/Plan (OT)    Rehab Potential (OT) good, to achieve stated therapy goals  -RB     Criteria for Skilled Therapeutic Interventions Met (OT) yes;skilled treatment is necessary  -RB     Therapy  Frequency (OT) 5 times/wk  -RB       Row Name 12/07/23 1021          Therapy Plan Review/Discharge Plan (OT)    Anticipated Discharge Disposition (OT) home with /7 care;home with home health  -RB       Row Name 12/07/23 1021          Vital Signs    O2 Delivery Pre Treatment room air  -RB     O2 Delivery Intra Treatment room air  -RB     O2 Delivery Post Treatment room air  -RB     Pre Patient Position Supine  -RB     Intra Patient Position Standing  -RB     Post Patient Position Supine  -RB       Row Name 12/07/23 1021          Positioning and Restraints    Pre-Treatment Position in bed  -RB     Post Treatment Position bed  -RB     In Bed notified nsg;supine;call light within reach;encouraged to call for assist;exit alarm on;fowlers  -RB               User Key  (r) = Recorded By, (t) = Taken By, (c) = Cosigned By      Initials Name Provider Type    Kimberly Anand, MICHAEL Occupational Therapist                   Outcome Measures       Row Name 12/07/23 1024          How much help from another is currently needed...    Putting on and taking off regular lower body clothing? 2  -RB     Bathing (including washing, rinsing, and drying) 2  -RB     Toileting (which includes using toilet bed pan or urinal) 2  -RB     Putting on and taking off regular upper body clothing 3  -RB     Taking care of personal grooming (such as brushing teeth) 3  -RB     Eating meals 3  -RB     AM-PAC 6 Clicks Score (OT) 15  -RB       Row Name 12/07/23 1004 12/07/23 0123       How much help from another person do you currently need...    Turning from your back to your side while in flat bed without using bedrails? 4  -CS 4  -JW    Moving from lying on back to sitting on the side of a flat bed without bedrails? 4  -CS 4  -JW    Moving to and from a bed to a chair (including a wheelchair)? 4  -CS 3  -JW    Standing up from a chair using your arms (e.g., wheelchair, bedside chair)? 4  -CS 3  -JW    Climbing 3-5 steps with a railing? 3  -CS 3  -JW     To walk in hospital room? 3  -CS 3  -JW    AM-PAC 6 Clicks Score (PT) 22  -CS 20  -JW    Highest Level of Mobility Goal 7 --> Walk 25 feet or more  -CS 6 --> Walk 10 steps or more  -      Row Name 12/07/23 1024          Modified Edgar Scale    Modified Dylan Scale 4 - Moderately severe disability.  Unable to walk without assistance, and unable to attend to own bodily needs without assistance.  -RB       Row Name 12/07/23 1024 12/07/23 1004       Functional Assessment    Outcome Measure Options AM-PAC 6 Clicks Daily Activity (OT);Modified Edgar  -RB AM-PAC 6 Clicks Basic Mobility (PT)  -              User Key  (r) = Recorded By, (t) = Taken By, (c) = Cosigned By      Initials Name Provider Type    RB Kimberly Gonzalez, OT Occupational Therapist    Sharon Lowery, PT Physical Therapist    Greer Gallegos, RN Registered Nurse                    Occupational Therapy Education       Title: PT OT SLP Therapies (In Progress)       Topic: Occupational Therapy (Not Started)       Point: ADL training (Not Started)       Description:   Instruct learner(s) on proper safety adaptation and remediation techniques during self care or transfers.   Instruct in proper use of assistive devices.                  Learner Progress:  Not documented in this visit.              Point: Home exercise program (Not Started)       Description:   Instruct learner(s) on appropriate technique for monitoring, assisting and/or progressing therapeutic exercises/activities.                  Learner Progress:  Not documented in this visit.              Point: Precautions (Not Started)       Description:   Instruct learner(s) on prescribed precautions during self-care and functional transfers.                  Learner Progress:  Not documented in this visit.              Point: Body mechanics (Not Started)       Description:   Instruct learner(s) on proper positioning and spine alignment during self-care, functional mobility activities  and/or exercises.                  Learner Progress:  Not documented in this visit.                                  OT Recommendation and Plan  Planned Therapy Interventions (OT): transfer/mobility retraining, strengthening exercise, ROM/therapeutic exercise, BADL retraining, adaptive equipment training, functional balance retraining, occupation/activity based interventions, patient/caregiver education/training, cognitive/visual perception retraining, activity tolerance training  Therapy Frequency (OT): 5 times/wk  Plan of Care Review  Plan of Care Reviewed With: patient  Progress: no change  Outcome Evaluation: The pt was admitted to [facility] 2/2 to seizure activity. Dx also includes ETOH withdrawal, alcohol cirrhosis. Pmhx significant for TBI, chronic pancreatitis, anxiety, DM2, and ETOH abuse. The pt lives with her  who works full time. 3-4 weekly falls reported. The pt was oriented x3 this morning. SBA bed mobility. SBA to stand and CGA/Min A + walker for OOB mobility to her bathroom. Impulsive movements present during ADL's. She has L visual inattention and verbal/tactile cues provided to safely assist her around household surfaces. Education provided on safety and calling for nursing prior to OOB standing. Will continue to follow. Anticipate the need for 24/7 assistance at d/c.     Time Calculation:         Time Calculation- OT       Row Name 12/07/23 1008             Time Calculation- OT    OT Start Time 0845  -RB      OT Stop Time 0859  -RB      OT Time Calculation (min) 14 min  -RB      OT Received On 12/07/23  -RB      OT - Next Appointment 12/08/23  -RB      OT Goal Re-Cert Due Date 12/21/23  -RB         Untimed Charges    OT Eval/Re-eval Minutes 14  -RB         Total Minutes    Untimed Charges Total Minutes 14  -RB       Total Minutes 14  -RB                User Key  (r) = Recorded By, (t) = Taken By, (c) = Cosigned By      Initials Name Provider Type    Kimberly Anand OT Occupational Therapist                   Therapy Charges for Today       Code Description Service Date Service Provider Modifiers Qty    [id number] HC OT EVAL MOD COMPLEXITY 3 12/7/2023 Kimberly Gonzalez OT GO 1                 Kimberly Gonzalez OT  12/7/2023

## 2023-12-07 NOTE — PROGRESS NOTES
Dedicated to Hospital Care    [phone]   LOS: 1 day     Name: Corinna Almonte  Age/Sex: 38 y.o. female  :  1985        PCP: System, Provider Not In  Chief Complaint   Patient presents with    Seizures      Subjective   No further seizure activity but has been pretty impulsive throughout the day today.  She tries to get up often and is unsteady on her feet.  A sitter has been ordered at the bedside.  Discussed with nursing.  Currently sitting up in the bed and eating lunch.  Seems a little bit confused about where she is  General: No Fever or Chills, Cardiac: No Chest Pain or Palpitations, Resp: No Cough or SOA, GI: No Nausea, Vomiting, or Diarrhea, and Other: No bleeding    folic acid, 1 mg, Oral, Daily  insulin lispro, 2-7 Units, Subcutaneous, 4x Daily AC & at Bedtime  levothyroxine, 100 mcg, Oral, Q AM  pantoprazole, 40 mg, Oral, Daily  prednisoLONE sodium phosphate, 40 mg, Oral, Daily  sodium chloride, 10 mL, Intravenous, Q12H  thiamine (B-1) IV, 200 mg, Intravenous, Q8H   Followed by  [START ON 2023] thiamine, 100 mg, Oral, Daily      sodium chloride, 75 mL/hr, Last Rate: Stopped (23 1425)        Objective   Vital Signs  Temp:  [98.2 °F (36.8 °C)-100.2 °F (37.9 °C)] 98.2 °F (36.8 °C)  Heart Rate:  [] 106  Resp:  [18] 18  BP: (108-145)/(77-97) 108/83  Body mass index is 20.25 kg/m².    Intake/Output Summary (Last 24 hours) at 2023 1543  Last data filed at 2023 1944  Gross per 24 hour   Intake 1100 ml   Output --   Net 1100 ml       Physical Exam  Vitals and nursing note reviewed.   Constitutional:       General: She is not in acute distress.     Appearance: Normal appearance. She is ill-appearing.   Cardiovascular:      Rate and Rhythm: Normal rate and regular rhythm.   Pulmonary:      Effort: No respiratory distress.      Breath sounds: Normal breath sounds.   Abdominal:      General: Bowel sounds are normal. There is no distension.      Palpations: Abdomen is soft.    Neurological:      General: No focal deficit present.      Mental Status: She is alert. She is disoriented.           Results Review:       I reviewed the patient's new clinical results.  Results from last 7 days   Lab Units 12/07/23  0801 12/06/23  1722   WBC 10*3/mm3 5.81 3.12*   HEMOGLOBIN g/dL 10.0* 11.6*   PLATELETS 10*3/mm3 25* 28*     Results from last 7 days   Lab Units 12/07/23  0801 12/06/23  2343 12/06/23  1722   SODIUM mmol/L 133*  --  136   POTASSIUM mmol/L 4.2  4.2  --  2.9*   CHLORIDE mmol/L 100  --  94*   CO2 mmol/L 22.7  --  28.0   BUN mg/dL 7  --  7   CREATININE mg/dL 0.43*  --  0.47*   CALCIUM mg/dL 8.2*  --  9.1   MAGNESIUM mg/dL 2.7* 2.0 1.0*   PHOSPHORUS mg/dL 2.4*  --   --    Estimated Creatinine Clearance: 148.4 mL/min (A) (by C-G formula based on SCr of 0.43 mg/dL (L)).  Lab Results   Component Value Date    HGBA1C 7.90 (H) 12/07/2023     Glucose   Date/Time Value Ref Range Status   12/07/2023 1111 327 (H) 70 - 130 mg/dL Final   12/07/2023 0104 196 (H) 70 - 130 mg/dL Final         Assessment & Plan   Active Hospital Problems    Diagnosis  POA    **Alcohol withdrawal seizure [F10.939, R56.9]  Yes    Alcoholic hepatitis [K70.10]  Unknown    Alcoholic cirrhosis [K70.30]  Unknown    Esophageal varices [I85.00]  Unknown    Pancytopenia [D61.818]  Unknown    Hypomagnesemia [E83.42]  Unknown    Hypokalemia [E87.6]  Unknown    Diabetes mellitus [E11.9]  Unknown    Anxiety [F41.9]  Unknown    Depression [F32.A]  Unknown      Resolved Hospital Problems   No resolved problems to display.       PLAN    This is a 38-year-old lady with a history of cirrhosis alcohol use type 2 diabetes anxiety depression and PTSD who presented to the hospital with a seizure-like episode  -Questionable alcohol withdrawal seizure.  However this does not sound much like a seizure.  Can ask neurology to weigh in regarding further workup but if this was a seizure it would be likely related to her alcohol use.  She drinks a  half a gallon a day and her last drink was about 48 hours prior to admission.  Has had alcohol withdrawal in the past.  With alcohol withdrawal seizures antiepileptics are often not utilized.  Continue with seizure precautions and monitor closely  -Possible alcoholic hepatitis and underlying cirrhosis with history of GI bleeding and varices.  Gastroenterology has been consulted to evaluate given the possible history of hematemesis.  Her MELD score is elevated and discriminant function shows that she could benefit from initiation with prednisolone which was started yesterday.  -Remains thrombocytopenic today and hemoglobin is dropped around 1.5 g.  Hematology's been consulted.  -Aggressive electrolyte replacement has been ordered  -Her blood sugars are running high.  She did eat well for lunch we will go ahead and resume her Lantus this evening and cover with sliding scale insulin  -Mechanical DVT prophylaxis given the significant thrombocytopenia  -Full code    -Her overall prognosis is extremely guarded given her history of chronic alcohol use with alcoholic hepatitis and elevated MELD score.    Disposition  Expected Discharge Date: 12/10/2023; Expected Discharge Time:        Xavi Goyal MD  [facility]  12/07/23  15:43 EST

## 2023-12-08 ENCOUNTER — APPOINTMENT (OUTPATIENT)
Dept: CT IMAGING | Facility: HOSPITAL | Age: 38
DRG: 896 | End: 2023-12-08
Payer: OTHER GOVERNMENT

## 2023-12-08 ENCOUNTER — APPOINTMENT (OUTPATIENT)
Dept: NEUROLOGY | Facility: HOSPITAL | Age: 38
DRG: 896 | End: 2023-12-08
Payer: OTHER GOVERNMENT

## 2023-12-08 PROBLEM — D68.9 COAGULOPATHY: Status: ACTIVE | Noted: 2023-12-08

## 2023-12-08 LAB
ALBUMIN SERPL-MCNC: 3.9 G/DL (ref 3.5–5.2)
ALBUMIN/GLOB SERPL: 1.4 G/DL
ALP SERPL-CCNC: 293 U/L (ref 39–117)
ALPHA-FETOPROTEIN: 4.21 NG/ML (ref 0–8.3)
ALT SERPL W P-5'-P-CCNC: 48 U/L (ref 1–33)
AMMONIA BLD-SCNC: 53 UMOL/L (ref 11–51)
ANION GAP SERPL CALCULATED.3IONS-SCNC: 12 MMOL/L (ref 5–15)
AST SERPL-CCNC: 159 U/L (ref 1–32)
BASOPHILS # BLD AUTO: 0.02 10*3/MM3 (ref 0–0.2)
BASOPHILS NFR BLD AUTO: 0.3 % (ref 0–1.5)
BILIRUB SERPL-MCNC: 8 MG/DL (ref 0–1.2)
BUN SERPL-MCNC: 12 MG/DL (ref 6–20)
BUN/CREAT SERPL: 25 (ref 7–25)
CALCIUM SPEC-SCNC: 9.1 MG/DL (ref 8.6–10.5)
CHLORIDE SERPL-SCNC: 99 MMOL/L (ref 98–107)
CO2 SERPL-SCNC: 22 MMOL/L (ref 22–29)
CREAT SERPL-MCNC: 0.48 MG/DL (ref 0.57–1)
DEPRECATED RDW RBC AUTO: 49.5 FL (ref 37–54)
EGFRCR SERPLBLD CKD-EPI 2021: 123.7 ML/MIN/1.73
EOSINOPHIL # BLD AUTO: 0.02 10*3/MM3 (ref 0–0.4)
EOSINOPHIL NFR BLD AUTO: 0.3 % (ref 0.3–6.2)
ERYTHROCYTE [DISTWIDTH] IN BLOOD BY AUTOMATED COUNT: 14.5 % (ref 12.3–15.4)
FERRITIN SERPL-MCNC: 579 NG/ML (ref 13–150)
GLOBULIN UR ELPH-MCNC: 2.8 GM/DL
GLUCOSE BLDC GLUCOMTR-MCNC: 449 MG/DL (ref 70–130)
GLUCOSE BLDC GLUCOMTR-MCNC: 452 MG/DL (ref 70–130)
GLUCOSE BLDC GLUCOMTR-MCNC: 483 MG/DL (ref 70–130)
GLUCOSE BLDC GLUCOMTR-MCNC: 535 MG/DL (ref 70–130)
GLUCOSE BLDC GLUCOMTR-MCNC: 93 MG/DL (ref 70–130)
GLUCOSE BLDC GLUCOMTR-MCNC: 95 MG/DL (ref 70–130)
GLUCOSE SERPL-MCNC: 99 MG/DL (ref 65–99)
HCT VFR BLD AUTO: 30.9 % (ref 34–46.6)
HGB BLD-MCNC: 10.7 G/DL (ref 12–15.9)
INR PPP: 1.99 (ref 0.9–1.1)
IRON 24H UR-MRATE: 43 MCG/DL (ref 37–145)
IRON SATN MFR SERPL: 19 % (ref 20–50)
LYMPHOCYTES # BLD AUTO: 1.26 10*3/MM3 (ref 0.7–3.1)
LYMPHOCYTES NFR BLD AUTO: 21.5 % (ref 19.6–45.3)
MCH RBC QN AUTO: 32.5 PG (ref 26.6–33)
MCHC RBC AUTO-ENTMCNC: 34.6 G/DL (ref 31.5–35.7)
MCV RBC AUTO: 93.9 FL (ref 79–97)
MONOCYTES # BLD AUTO: 0.43 10*3/MM3 (ref 0.1–0.9)
MONOCYTES NFR BLD AUTO: 7.3 % (ref 5–12)
NEUTROPHILS NFR BLD AUTO: 4.11 10*3/MM3 (ref 1.7–7)
NEUTROPHILS NFR BLD AUTO: 70.1 % (ref 42.7–76)
PLATELET # BLD AUTO: 34 10*3/MM3 (ref 140–450)
PLATELET # BLD AUTO: 34 10*3/MM3 (ref 140–450)
PLATELETS.RETICULATED NFR BLD AUTO: 16.3 % (ref 0.9–6.5)
PMV BLD AUTO: 12 FL (ref 6–12)
POTASSIUM SERPL-SCNC: 3.4 MMOL/L (ref 3.5–5.2)
POTASSIUM SERPL-SCNC: 4 MMOL/L (ref 3.5–5.2)
PROT SERPL-MCNC: 6.7 G/DL (ref 6–8.5)
PROTHROMBIN TIME: 22.9 SECONDS (ref 11.7–14.2)
RBC # BLD AUTO: 3.29 10*6/MM3 (ref 3.77–5.28)
SODIUM SERPL-SCNC: 133 MMOL/L (ref 136–145)
TIBC SERPL-MCNC: 231 MCG/DL (ref 298–536)
TRANSFERRIN SERPL-MCNC: 155 MG/DL (ref 200–360)
WBC NRBC COR # BLD AUTO: 5.87 10*3/MM3 (ref 3.4–10.8)

## 2023-12-08 PROCEDURE — 63710000001 INSULIN LISPRO (HUMAN) PER 5 UNITS: Performed by: STUDENT IN AN ORGANIZED HEALTH CARE EDUCATION/TRAINING PROGRAM

## 2023-12-08 PROCEDURE — 85055 RETICULATED PLATELET ASSAY: CPT | Performed by: INTERNAL MEDICINE

## 2023-12-08 PROCEDURE — 80053 COMPREHEN METABOLIC PANEL: CPT | Performed by: HOSPITALIST

## 2023-12-08 PROCEDURE — 25010000002 THIAMINE PER 100 MG: Performed by: STUDENT IN AN ORGANIZED HEALTH CARE EDUCATION/TRAINING PROGRAM

## 2023-12-08 PROCEDURE — 63710000001 INSULIN LISPRO (HUMAN) PER 5 UNITS: Performed by: NURSE PRACTITIONER

## 2023-12-08 PROCEDURE — 95816 EEG AWAKE AND DROWSY: CPT | Performed by: STUDENT IN AN ORGANIZED HEALTH CARE EDUCATION/TRAINING PROGRAM

## 2023-12-08 PROCEDURE — 74177 CT ABD & PELVIS W/CONTRAST: CPT

## 2023-12-08 PROCEDURE — 82105 ALPHA-FETOPROTEIN SERUM: CPT | Performed by: INTERNAL MEDICINE

## 2023-12-08 PROCEDURE — 95816 EEG AWAKE AND DROWSY: CPT

## 2023-12-08 PROCEDURE — 82948 REAGENT STRIP/BLOOD GLUCOSE: CPT

## 2023-12-08 PROCEDURE — 84466 ASSAY OF TRANSFERRIN: CPT | Performed by: INTERNAL MEDICINE

## 2023-12-08 PROCEDURE — 99232 SBSQ HOSP IP/OBS MODERATE 35: CPT

## 2023-12-08 PROCEDURE — 85610 PROTHROMBIN TIME: CPT | Performed by: INTERNAL MEDICINE

## 2023-12-08 PROCEDURE — 82728 ASSAY OF FERRITIN: CPT | Performed by: INTERNAL MEDICINE

## 2023-12-08 PROCEDURE — 63710000001 INSULIN GLARGINE PER 5 UNITS: Performed by: HOSPITALIST

## 2023-12-08 PROCEDURE — 82140 ASSAY OF AMMONIA: CPT | Performed by: INTERNAL MEDICINE

## 2023-12-08 PROCEDURE — 63710000001 PREDNISOLONE PER 5 MG: Performed by: STUDENT IN AN ORGANIZED HEALTH CARE EDUCATION/TRAINING PROGRAM

## 2023-12-08 PROCEDURE — 84132 ASSAY OF SERUM POTASSIUM: CPT | Performed by: HOSPITALIST

## 2023-12-08 PROCEDURE — 25010000002 VITAMIN K1 PER 1 MG: Performed by: INTERNAL MEDICINE

## 2023-12-08 PROCEDURE — 99254 IP/OBS CNSLTJ NEW/EST MOD 60: CPT | Performed by: STUDENT IN AN ORGANIZED HEALTH CARE EDUCATION/TRAINING PROGRAM

## 2023-12-08 PROCEDURE — 83540 ASSAY OF IRON: CPT | Performed by: INTERNAL MEDICINE

## 2023-12-08 PROCEDURE — 85025 COMPLETE CBC W/AUTO DIFF WBC: CPT | Performed by: HOSPITALIST

## 2023-12-08 PROCEDURE — 25510000001 IOPAMIDOL 61 % SOLUTION: Performed by: HOSPITALIST

## 2023-12-08 PROCEDURE — 25010000002 MIDAZOLAM PER 1 MG: Performed by: STUDENT IN AN ORGANIZED HEALTH CARE EDUCATION/TRAINING PROGRAM

## 2023-12-08 RX ORDER — POTASSIUM CHLORIDE 750 MG/1
40 TABLET, FILM COATED, EXTENDED RELEASE ORAL EVERY 4 HOURS
Status: ACTIVE | OUTPATIENT
Start: 2023-12-08 | End: 2023-12-08

## 2023-12-08 RX ORDER — INSULIN LISPRO 100 [IU]/ML
7 INJECTION, SOLUTION INTRAVENOUS; SUBCUTANEOUS ONCE
Status: COMPLETED | OUTPATIENT
Start: 2023-12-08 | End: 2023-12-08

## 2023-12-08 RX ORDER — LACTULOSE 10 G/15ML
30 SOLUTION ORAL 2 TIMES DAILY
Status: DISCONTINUED | OUTPATIENT
Start: 2023-12-08 | End: 2023-12-12 | Stop reason: HOSPADM

## 2023-12-08 RX ADMIN — ACETAMINOPHEN 650 MG: 325 TABLET ORAL at 19:18

## 2023-12-08 RX ADMIN — PHYTONADIONE 5 MG: 10 INJECTION, EMULSION INTRAMUSCULAR; INTRAVENOUS; SUBCUTANEOUS at 19:19

## 2023-12-08 RX ADMIN — Medication 5 MG: at 19:19

## 2023-12-08 RX ADMIN — THIAMINE HYDROCHLORIDE 200 MG: 100 INJECTION, SOLUTION INTRAMUSCULAR; INTRAVENOUS at 06:46

## 2023-12-08 RX ADMIN — MIDAZOLAM HYDROCHLORIDE 4 MG: 2 INJECTION, SOLUTION INTRAMUSCULAR; INTRAVENOUS at 00:05

## 2023-12-08 RX ADMIN — INSULIN GLARGINE 10 UNITS: 100 INJECTION, SOLUTION SUBCUTANEOUS at 20:47

## 2023-12-08 RX ADMIN — LORAZEPAM 2 MG: 1 TABLET ORAL at 22:39

## 2023-12-08 RX ADMIN — MIDAZOLAM HYDROCHLORIDE 4 MG: 2 INJECTION, SOLUTION INTRAMUSCULAR; INTRAVENOUS at 06:42

## 2023-12-08 RX ADMIN — RIFAXIMIN 550 MG: 550 TABLET ORAL at 20:34

## 2023-12-08 RX ADMIN — INSULIN LISPRO 7 UNITS: 100 INJECTION, SOLUTION INTRAVENOUS; SUBCUTANEOUS at 22:52

## 2023-12-08 RX ADMIN — RIFAXIMIN 550 MG: 550 TABLET ORAL at 11:01

## 2023-12-08 RX ADMIN — Medication 10 ML: at 20:35

## 2023-12-08 RX ADMIN — THIAMINE HYDROCHLORIDE 200 MG: 100 INJECTION, SOLUTION INTRAMUSCULAR; INTRAVENOUS at 15:18

## 2023-12-08 RX ADMIN — LORAZEPAM 2 MG: 1 TABLET ORAL at 21:34

## 2023-12-08 RX ADMIN — LORAZEPAM 2 MG: 1 TABLET ORAL at 04:58

## 2023-12-08 RX ADMIN — LORAZEPAM 2 MG: 1 TABLET ORAL at 20:33

## 2023-12-08 RX ADMIN — INSULIN LISPRO 7 UNITS: 100 INJECTION, SOLUTION INTRAVENOUS; SUBCUTANEOUS at 20:47

## 2023-12-08 RX ADMIN — MIDAZOLAM HYDROCHLORIDE 4 MG: 2 INJECTION, SOLUTION INTRAMUSCULAR; INTRAVENOUS at 15:36

## 2023-12-08 RX ADMIN — MIDAZOLAM HYDROCHLORIDE 4 MG: 2 INJECTION, SOLUTION INTRAMUSCULAR; INTRAVENOUS at 10:52

## 2023-12-08 RX ADMIN — THIAMINE HYDROCHLORIDE 200 MG: 100 INJECTION, SOLUTION INTRAMUSCULAR; INTRAVENOUS at 21:21

## 2023-12-08 RX ADMIN — LORAZEPAM 2 MG: 1 TABLET ORAL at 19:18

## 2023-12-08 RX ADMIN — MIDAZOLAM HYDROCHLORIDE 4 MG: 2 INJECTION, SOLUTION INTRAMUSCULAR; INTRAVENOUS at 04:40

## 2023-12-08 RX ADMIN — MIDAZOLAM HYDROCHLORIDE 4 MG: 2 INJECTION, SOLUTION INTRAMUSCULAR; INTRAVENOUS at 07:53

## 2023-12-08 RX ADMIN — PREDNISOLONE SODIUM PHOSPHATE 40 MG: 15 SOLUTION ORAL at 11:01

## 2023-12-08 RX ADMIN — LEVOTHYROXINE SODIUM 100 MCG: 100 TABLET ORAL at 06:46

## 2023-12-08 RX ADMIN — IOPAMIDOL 85 ML: 612 INJECTION, SOLUTION INTRAVENOUS at 16:17

## 2023-12-08 RX ADMIN — MIDAZOLAM HYDROCHLORIDE 4 MG: 2 INJECTION, SOLUTION INTRAMUSCULAR; INTRAVENOUS at 03:08

## 2023-12-08 RX ADMIN — MIDAZOLAM HYDROCHLORIDE 4 MG: 2 INJECTION, SOLUTION INTRAMUSCULAR; INTRAVENOUS at 07:33

## 2023-12-08 RX ADMIN — LACTULOSE 30 G: 10 SOLUTION ORAL at 20:34

## 2023-12-08 RX ADMIN — MIDAZOLAM HYDROCHLORIDE 4 MG: 2 INJECTION, SOLUTION INTRAMUSCULAR; INTRAVENOUS at 01:07

## 2023-12-08 RX ADMIN — LORAZEPAM 2 MG: 1 TABLET ORAL at 23:47

## 2023-12-08 RX ADMIN — MIDAZOLAM HYDROCHLORIDE 4 MG: 2 INJECTION, SOLUTION INTRAMUSCULAR; INTRAVENOUS at 13:09

## 2023-12-08 RX ADMIN — Medication 10 ML: at 10:08

## 2023-12-08 RX ADMIN — MIDAZOLAM HYDROCHLORIDE 4 MG: 2 INJECTION, SOLUTION INTRAMUSCULAR; INTRAVENOUS at 11:42

## 2023-12-08 NOTE — PROGRESS NOTES
[facility] to [facility]    [phone]   LOS: 2 days     Name: Corinna Almonte  Age/Sex: 38 y.o. female  :  1985        PCP: System, Provider Not In  Chief Complaint   Patient presents with    Seizures      Subjective   No further seizure activity but has been pretty impulsive throughout the day today.  She tries to get up often and is unsteady on her feet.  A sitter has been ordered at the bedside.  Discussed with nursing.  Currently sitting up in the bed and eating lunch.  Seems a little bit confused about where she is  General: No Fever or Chills, Cardiac: No Chest Pain or Palpitations, Resp: No Cough or SOA, GI: No Nausea, Vomiting, or Diarrhea, and Other: No bleeding    folic acid, 1 mg, Oral, Daily  insulin glargine, 10 Units, Subcutaneous, Nightly  insulin lispro, 2-7 Units, Subcutaneous, 4x Daily AC & at Bedtime  levothyroxine, 100 mcg, Oral, Q AM  pantoprazole, 40 mg, Oral, BID AC  potassium chloride ER, 40 mEq, Oral, Q4H  prednisoLONE sodium phosphate, 40 mg, Oral, Daily  rifAXIMin, 550 mg, Oral, Q12H  sodium chloride, 10 mL, Intravenous, Q12H  thiamine (B-1) IV, 200 mg, Intravenous, Q8H   Followed by  [START ON 2023] thiamine, 100 mg, Oral, Daily      sodium chloride, 75 mL/hr, Last Rate: Stopped (23 1425)        Objective   Vital Signs  Temp:  [98.1 °F (36.7 °C)-98.8 °F (37.1 °C)] 98.2 °F (36.8 °C)  Heart Rate:  [] 97  Resp:  [18] 18  BP: (110-127)/(76-93) 119/85  Body mass index is 20.13 kg/m².    Intake/Output Summary (Last 24 hours) at 2023 1346  Last data filed at 2023 1809  Gross per 24 hour   Intake 120 ml   Output --   Net 120 ml       Physical Exam  Vitals and nursing note reviewed.   Constitutional:       General: She is not in acute distress.     Appearance: Normal appearance. She is ill-appearing.   Cardiovascular:      Rate and Rhythm: Normal rate and regular rhythm.   Pulmonary:      Effort: No respiratory distress.      Breath sounds: Normal breath  sounds.   Abdominal:      General: Bowel sounds are normal. There is no distension.      Palpations: Abdomen is soft.   Neurological:      General: No focal deficit present.      Mental Status: She is alert. She is disoriented.           Results Review:       I reviewed the patient's new clinical results.  Results from last 7 days   Lab Units 12/08/23  0630 12/07/23  1703 12/07/23  0801 12/06/23  1722   WBC 10*3/mm3 5.87  --  5.81 3.12*   HEMOGLOBIN g/dL 10.7*  --  10.0* 11.6*   PLATELETS 10*3/mm3 34*  34* 25* 25* 28*     Results from last 7 days   Lab Units 12/08/23  0630 12/07/23  0801 12/06/23  2343 12/06/23  1722   SODIUM mmol/L 133* 133*  --  136   POTASSIUM mmol/L 3.4* 4.2  4.2  --  2.9*   CHLORIDE mmol/L 99 100  --  94*   CO2 mmol/L 22.0 22.7  --  28.0   BUN mg/dL 12 7  --  7   CREATININE mg/dL 0.48* 0.43*  --  0.47*   CALCIUM mg/dL 9.1 8.2*  --  9.1   MAGNESIUM mg/dL  --  2.7* 2.0 1.0*   PHOSPHORUS mg/dL  --  2.4*  --   --    Estimated Creatinine Clearance: 132.2 mL/min (A) (by C-G formula based on SCr of 0.48 mg/dL (L)).  Results from last 7 days   Lab Units 12/08/23  0630 12/07/23  0801 12/06/23  1722   ALK PHOS U/L 293* 248* 300*   BILIRUBIN mg/dL 8.0* 7.7* 8.4*   BILIRUBIN DIRECT mg/dL  --  6.7*  --    ALT (SGPT) U/L 48* 45* 54*   AST (SGOT) U/L 159* 216* 276*       Lab Results   Component Value Date    HGBA1C 7.90 (H) 12/07/2023     Glucose   Date/Time Value Ref Range Status   12/08/2023 1118 95 70 - 130 mg/dL Final   12/08/2023 0810 93 70 - 130 mg/dL Final   12/07/2023 2051 333 (H) 70 - 130 mg/dL Final   12/07/2023 1706 282 (H) 70 - 130 mg/dL Final   12/07/2023 1111 327 (H) 70 - 130 mg/dL Final   12/07/2023 0104 196 (H) 70 - 130 mg/dL Final         Assessment & Plan   Active Hospital Problems    Diagnosis  POA    **Alcohol withdrawal seizure [F10.939, R56.9]  Yes    Severe malnutrition [E43]  Yes    Thrombocytopenia [D69.6]  Unknown    Anemia [D64.9]  Unknown    Alcoholic hepatitis [K70.10]  Unknown     Alcoholic cirrhosis [K70.30]  Unknown    Esophageal varices [I85.00]  Unknown    Pancytopenia [D61.818]  Unknown    Hypomagnesemia [E83.42]  Unknown    Hypokalemia [E87.6]  Unknown    Diabetes mellitus [E11.9]  Unknown    Anxiety [F41.9]  Unknown    Depression [F32.A]  Unknown      Resolved Hospital Problems   No resolved problems to display.       PLAN    This is a 38-year-old lady with a history of cirrhosis alcohol use type 2 diabetes anxiety depression and PTSD who presented to the hospital with a seizure-like episode  -Questionable alcohol withdrawal seizure.  Appreciate neurology input discussed with Dr. Osborne earlier today.  Plan for MRI and EEG.  No antiepileptic medication for now.  -Her withdrawal was a pretty significant issue overnight.  She was already pretty impulsive before her withdrawal got bad but she needed 5 or 6 doses of Versed stacked every 15 minutes and she was still having significantly elevated CIWA scores.  Transfer orders were placed but she has stabilized and plan to continue to monitor on the current unit.  Discussed with nursing to have a very low threshold for transition to higher level of care if felt indicated.  -Probable alcoholic hepatitis and underlying cirrhosis with history of GI bleeding and varices.  Gastroenterology has been consulted to evaluate given the possible history of hematemesis.  Her MELD score is elevated and discriminant function shows that she could benefit from initiation with prednisolone which was started 12/6.  -Remains thrombocytopenic today and hemoglobin is dropped around 1.5 g.  Hematology's input appreciated  -Aggressive electrolyte replacement has been ordered  -Her blood sugars are much better controlled after initiating basal insulin.  Continue sliding scale coverage  -Mechanical DVT prophylaxis given the significant thrombocytopenia  -Full code    -Her overall prognosis is extremely guarded given her history of chronic alcohol use with  alcoholic hepatitis and elevated MELD score.    Disposition  Expected Discharge Date: 12/10/2023; Expected Discharge Time:        Xavi Goyal MD  [facility]  12/08/23  13:46 EST

## 2023-12-08 NOTE — PROGRESS NOTES
LOS: 2 days   Patient Care Team:  System, Provider Not In as PCP - General  Provider, No Known    Chief Complaint: Severe thrombocytopenia, alcoholic liver cirrhosis, alcohol withdrawal.    Interval History:  12/8/2023  No evidence of bleeding.  Patient is afebrile.  Slightly improved platelets 34,000.  No bowel movement so unable to test stool for occult blood.      A comprehensive 14 point review of systems was performed and was negative except as mentioned.    Vital Signs:  Temp:  [98.1 °F (36.7 °C)-98.8 °F (37.1 °C)] 98.3 °F (36.8 °C)  Heart Rate:  [] 97  Resp:  [18] 18  BP: (110-127)/(76-93) 116/86    Intake/Output Summary (Last 24 hours) at 12/8/2023 1535  Last data filed at 12/7/2023 1809  Gross per 24 hour   Intake 120 ml   Output --   Net 120 ml       Physical Exam:   General Appearance:    No acute distress, alert.  Restricted wrist due to possible DT.   Lungs:     Clear to auscultation bilaterally     Heart:    Regular rhythm and normal rate.     Abdomen:     Soft, non-tender, non-distended.    Extremities:   Moves all extremities well.  No clubbing, cyanosis, or edema.     Results Review:   CBC:      Lab 12/08/23  0630 12/07/23  1703 12/07/23  0801 12/06/23  1722   WBC 5.87  --  5.81 3.12*   HEMOGLOBIN 10.7*  --  10.0* 11.6*   HEMATOCRIT 30.9*  --  28.8* 33.8*   PLATELETS 34*  34* 25* 25* 28*   NEUTROS ABS 4.11  --  4.56 2.37   IMMATURE GRANS (ABS)  --   --  0.04  --    LYMPHS ABS 1.26  --  0.75 0.45*   MONOS ABS 0.43  --  0.42 0.25   EOS ABS 0.02  --  0.02 0.00   MCV 93.9  --  92.9 89.9     CMP:        Lab 12/08/23  0630 12/07/23  0801 12/06/23  2343 12/06/23  1722   SODIUM 133* 133*  --  136   POTASSIUM 3.4* 4.2  4.2  --  2.9*   CHLORIDE 99 100  --  94*   CO2 22.0 22.7  --  28.0   ANION GAP 12.0 10.3  --  14.0   BUN 12 7  --  7   CREATININE 0.48* 0.43*  --  0.47*   EGFR 123.7 127.1  --  124.4   GLUCOSE 99 304*  --  65   CALCIUM 9.1 8.2*  --  9.1   MAGNESIUM  --  2.7* 2.0 1.0*   PHOSPHORUS  --   2.4*  --   --    TOTAL PROTEIN 6.7 5.9*  --  7.1   ALBUMIN 3.9 3.3*  --  4.2   GLOBULIN 2.8  --   --  2.9   ALT (SGPT) 48* 45*  --  54*   AST (SGOT) 159* 216*  --  276*   BILIRUBIN 8.0* 7.7*  --  8.4*   INDIRECT BILIRUBIN  --  1.0  --   --    BILIRUBIN DIRECT  --  6.7*  --   --    ALK PHOS 293* 248*  --  300*       Results from last 7 days   Lab Units 12/06/23  1722   CK TOTAL U/L 92     Results from last 7 days   Lab Units 12/08/23  0630 12/06/23  1722   INR  1.99* 1.81*         Results from last 7 days   Lab Units 12/07/23  0801   MAGNESIUM mg/dL 2.7*     Lab Results   Component Value Date    IRON 43 12/08/2023    TIBC 231 (L) 12/08/2023    FERRITIN 579.00 (H) 12/08/2023     Lab Results   Component Value Date    BBRNDAKH32 893 12/07/2023             I reviewed the patient's new clinical results.        Assessment:    1. Questionable alcohol withdrawal seizure. The patient is being followed by and managed by primary team. She has been put on withdrawal precaution and also started on folic acid and vitamin B1 injection.      2. Alcoholic liver cirrhosis. History of esophageal varices. The patient has been seen by GI service who ordered CT scan for the abdomen and also stool occult blood for test.      3. Severe thrombocytopenia.   This patient had normal platelets at 320,000 on 12/03/2016 when she had pancreatitis and hospitalized at [facility]. She receives most of care from [facility] and we do not have lab results since that time. She presented with severe thrombocytopenia with platelets 28,000. We do not know exactly when she developed thrombocytopenia. Nevertheless, her thrombocytopenia is most likely related to several reasons including direct toxicity from alcohol consumption. She drinks half gallon of liquor a day and she also has liver cirrhosis which causes thrombocytopenia itself and she also might have splenomegaly due to liver cirrhosis which has been seen by CT scan. I tested her  today with mildly elevated IPF at 16.4% with mildly elevated LDH at 238. B12 level came back as normal at 893. I am not sure whether the patient is having ITP which cannot be excluded. She also has been started on prednisolone so will see whether she will respond to the treatment. We will repeat platelets and IPF tomorrow morning for reassessment.   12/8/2023 patient has slightly improved platelets 34,000, with IPF 16.3%.  No evidence for bleeding.  Patient may be responding to oral steroids.  Continue to monitor.  12/8/2023 CT scan reported hepatomegaly with fatty liver, spleen measures 14 cm, up from 12 cm previously in 2016.  There is also perigastric and GE junction varices.  Chronic pancreatitis.       4. Mild anemia.   Not surprising the patient is anemic due to her liver cirrhosis and potential GI bleeding. Agree with testing stool for occult blood. I also will check her iron study tomorrow for further assessment. The patient already has been started on oral folic acid 1 mg daily so we will not check a level.   12/8/2023 iron study reporting ferritin 579, iron saturation 19% and normal B12 893.  Anemia is due to inflammatory reaction.     5.  Coagulopathy.  This is due to liver cirrhosis and poor nutrition with vitamin K deficiency.  INR 1.99 on 12/8/2023.  Due to increased risk for bleeding, will give patient a dose of vitamin K to reverse INR.      PLAN:   Will give patient a dose of vitamin K 5 mg IV.    Continue oral prednisolone 40 mg daily.   The patient will continue oral folic acid and also vitamin B1 injection and management of alcohol withdrawal seizure per primary team.   If GI service plans to have EGD examination, I recommend to transfuse her with 2 units of platelets prior to the procedure or any procedure.   Continue to monitor CBC and INR tomorrow morning.     Examined the patient today.  Spoke with nursing staff.        Nuha Banerjee MD PhD  12/08/23  15:35 EST

## 2023-12-08 NOTE — PROGRESS NOTES
[facility]  Inpatient Progress Note    Reason for Follow Up: Cirrhosis, alcoholic hepatitis    Subjective     Interval History:   Patient seen and examined at bedside today with  and sitter present. She appears sleepy but is able to say she is not having any abdominal pain, nausea, or vomiting. No bowel movements today per patient and nurse.         Current Facility-Administered Medications:     acetaminophen (TYLENOL) tablet 650 mg, 650 mg, Oral, Q4H PRN **OR** acetaminophen (TYLENOL) 160 MG/5ML oral solution 650 mg, 650 mg, Oral, Q4H PRN **OR** acetaminophen (TYLENOL) suppository 650 mg, 650 mg, Rectal, Q4H PRN, Amina Buchanan MD    sennosides-docusate (PERICOLACE) 8.6-50 MG per tablet 2 tablet, 2 tablet, Oral, BID PRN **AND** polyethylene glycol (MIRALAX) packet 17 g, 17 g, Oral, Daily PRN **AND** bisacodyl (DULCOLAX) EC tablet 5 mg, 5 mg, Oral, Daily PRN **AND** bisacodyl (DULCOLAX) suppository 10 mg, 10 mg, Rectal, Daily PRN, Amina Buchanan MD    dextrose (D50W) (25 g/50 mL) IV injection 25 g, 25 g, Intravenous, Q15 Min PRN, Amina Buchanan MD    dextrose (GLUTOSE) oral gel 15 g, 15 g, Oral, Q15 Min PRN, Amina Buchanan MD    folic acid (FOLVITE) tablet 1 mg, 1 mg, Oral, Daily, Amina Buchanan MD, 1 mg at 12/07/23 0830    glucagon (GLUCAGEN) injection 1 mg, 1 mg, Intramuscular, Q15 Min PRN, Amina Buchanan MD    insulin glargine (LANTUS, SEMGLEE) injection 10 Units, 10 Units, Subcutaneous, Nightly, Xavi Goyal MD, 10 Units at 12/07/23 2225    insulin lispro (HUMALOG/ADMELOG) injection 2-7 Units, 2-7 Units, Subcutaneous, 4x Daily AC & at Bedtime, Amina Buchanan MD, 5 Units at 12/07/23 2225    levothyroxine (SYNTHROID, LEVOTHROID) tablet 100 mcg, 100 mcg, Oral, Q AM, Amina Buchanan MD, 100 mcg at 12/08/23 0646    LORazepam (ATIVAN) tablet 1 mg, 1 mg, Oral, Q1H PRN, 1 mg at 12/07/23 1200 **OR** midazolam (VERSED) injection 2 mg, 2 mg,  Intravenous, Q1H PRN, 2 mg at 12/07/23 2127 **OR** LORazepam (ATIVAN) tablet 2 mg, 2 mg, Oral, Q1H PRN, 2 mg at 12/08/23 0458 **OR** midazolam (VERSED) injection 4 mg, 4 mg, Intravenous, Q1H PRN, 4 mg at 12/08/23 0308 **OR** midazolam (VERSED) injection 4 mg, 4 mg, Intravenous, Q15 Min PRN, 4 mg at 12/08/23 0753 **OR** midazolam (VERSED) injection 4 mg, 4 mg, Intramuscular, Q15 Min PRN, Amina Buchanan MD    Magnesium Standard Dose Replacement - Follow Nurse / BPA Driven Protocol, , Does not apply, PRN, Mel Gentile MD    melatonin tablet 5 mg, 5 mg, Oral, Nightly PRN, Amina Buchanan MD    ondansetron (ZOFRAN) tablet 4 mg, 4 mg, Oral, Q6H PRN, 4 mg at 12/06/23 2245 **OR** ondansetron (ZOFRAN) injection 4 mg, 4 mg, Intravenous, Q6H PRN, Amina Buchanan MD    pantoprazole (PROTONIX) EC tablet 40 mg, 40 mg, Oral, BID AC, Petra Maier MD    potassium chloride (K-DUR,KLOR-CON) ER tablet 40 mEq, 40 mEq, Oral, Q4H, Xavi Goyal MD    Potassium Replacement - Follow Nurse / BPA Driven Protocol, , Does not apply, PRN, Mel Gentile MD    prednisoLONE sodium phosphate (ORAPRED) 15 MG/5ML oral solution 40 mg, 40 mg, Oral, Daily, Amina Buchanan MD, 40 mg at 12/07/23 0828    riFAXIMin (XIFAXAN) tablet 550 mg, 550 mg, Oral, Q12H, Petra Maier MD, 550 mg at 12/07/23 2225    [COMPLETED] Insert Peripheral IV, , , Once **AND** sodium chloride 0.9 % flush 10 mL, 10 mL, Intravenous, PRN, Mel Gentile MD    sodium chloride 0.9 % flush 10 mL, 10 mL, Intravenous, Q12H, Amina Buchanan MD, 10 mL at 12/07/23 0829    sodium chloride 0.9 % flush 10 mL, 10 mL, Intravenous, PRN, Amina Buchanan MD    sodium chloride 0.9 % infusion 40 mL, 40 mL, Intravenous, PRN, Amina Buchanan MD    sodium chloride 0.9 % infusion, 75 mL/hr, Intravenous, Continuous, Amina Buchanan MD, Stopped at 12/07/23 1425    thiamine (B-1) injection 200 mg, 200 mg, Intravenous, Q8H, 200 mg at  12/08/23 0646 **FOLLOWED BY** [START ON 12/12/2023] thiamine (VITAMIN B-1) tablet 100 mg, 100 mg, Oral, Daily, Amina Buchanan MD      Objective     Vital Signs  Temp:  [98.1 °F (36.7 °C)-99.3 °F (37.4 °C)] 98.4 °F (36.9 °C)  Heart Rate:  [] 97  Resp:  [18] 18  BP: (108-127)/(76-93) 118/87  Body mass index is 20.13 kg/m².    Intake/Output Summary (Last 24 hours) at 12/8/2023 0835  Last data filed at 12/7/2023 1809  Gross per 24 hour   Intake 120 ml   Output --   Net 120 ml     No intake/output data recorded.     Physical Exam:   General: patient awake, alert and cooperative   Eyes: Normal lids and lashes, scleral icterus   Neck: supple, normal ROM   Skin: warm and dry, jaundiced   Cardiovascular: regular rhythm and rate   Pulm: regular and unlabored   Abdomen: soft, nontender, nondistended; normal bowel sounds     Results Review:     I reviewed the patient's new clinical results.    Results from last 7 days   Lab Units 12/08/23  0630 12/07/23  1703 12/07/23  0801 12/06/23  1722   WBC 10*3/mm3 5.87  --  5.81 3.12*   HEMOGLOBIN g/dL 10.7*  --  10.0* 11.6*   HEMATOCRIT % 30.9*  --  28.8* 33.8*   PLATELETS 10*3/mm3 34*  34* 25* 25* 28*     Results from last 7 days   Lab Units 12/08/23  0630 12/07/23  0801 12/06/23  1722   SODIUM mmol/L 133* 133* 136   POTASSIUM mmol/L 3.4* 4.2  4.2 2.9*   CHLORIDE mmol/L 99 100 94*   CO2 mmol/L 22.0 22.7 28.0   BUN mg/dL 12 7 7   CREATININE mg/dL 0.48* 0.43* 0.47*   CALCIUM mg/dL 9.1 8.2* 9.1   BILIRUBIN mg/dL 8.0* 7.7* 8.4*   ALK PHOS U/L 293* 248* 300*   ALT (SGPT) U/L 48* 45* 54*   AST (SGOT) U/L 159* 216* 276*   GLUCOSE mg/dL 99 304* 65     Results from last 7 days   Lab Units 12/08/23  0630 12/06/23  1722   INR  1.99* 1.81*     Lab Results   Lab Value Date/Time    LIPASE 43 12/03/2016 0657    LIPASE 51 12/02/2016 0647    LIPASE 62 (H) 12/01/2016 0753    LIPASE 87 (H) 11/30/2016 1612       Radiology:  CT Abdomen Pelvis With Contrast    (Results Pending)       Assessment  & Plan     Active Hospital Problems    Diagnosis     **Alcohol withdrawal seizure     Severe malnutrition     Thrombocytopenia     Anemia     Alcoholic hepatitis     Alcoholic cirrhosis     Esophageal varices     Pancytopenia     Hypomagnesemia     Hypokalemia     Diabetes mellitus     Anxiety     Depression        Assessment:  Decompensated cirrhosis with reported history of ascites, esophageal varices, and HE  [facility]  Elevated ammonia   AFP wnl  PT 22.9, INR 1.99   Alcoholic hepatitis/elevated LFTs - about the same today as yesterday   Hematemesis?   Occurred after swallowing blood after biting lip/tongue  Alcohol abuse  Withdrawals       Plan:  Continue steroids for alcoholic hepatitis - continue - monitor LFTs. Check lille score at day 7   Await CT Abd/Pelvis to assess liver, for abd pain and diarrhea   Xifaxan 550 tid  Held lactulose yesterday in setting of reported diarrhea, but no BM per pt or nurse so will resume today  Tolerating po diet well today - nutrition consulted  Hgb stable today. No further episodes of hematemesis - Patient would ultimately benefit from EGD this admission, but will need optimization with regards to platelets first. It would also be best that she is done with withdrawals. If Hgb continues to drop or if she has further episodes of GI bleeding we can consider doing it sooner.   Advised alcohol cessation     Patient and plan of care discussed with attending, Dr. Petra Gonsalves PA-C

## 2023-12-08 NOTE — CONSULTS
"Neurology Consult Note    Consult Date: 12/8/2023    Referring MD: Amina Buchanan MD    Reason for Consult I have been asked to see the patient in neurological consultation to render advice and opinion regarding possible Glutarol seizures.    Corinna Almonte is a 38 y.o. 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 resulted in loss of consciousness 2 months later she stated she had generalized tonic-clonic seizures then about in the next few months she had another 1, semiology unknown but she would lose her consciousness, she stated that she will frequently wake up with blood around her pillow which may indicate seizures at sleep, she admits to drinking heavily about 3 vodka 4-5 times weekly, she was complaining of memory issues and blurred vision that has been going on for over a year, currently she has some mild agitation and she was restrained otherwise awake alert and oriented x 3 and following commands appropriately with no focal weakness.    Past Medical History:   Diagnosis Date    Alcohol abuse with alcohol-induced anxiety disorder     Anxiety     Depression     Disease of thyroid gland     Status post alcohol detoxification        Exam  /87 (BP Location: Left arm, Patient Position: Lying)   Pulse 97   Temp 98.4 °F (36.9 °C) (Oral)   Resp 18   Ht 162.6 cm (64\")   Wt 53.2 kg (117 lb 4.6 oz)   LMP  (LMP Unknown)   SpO2 96%   BMI 20.13 kg/m²   Gen: Slight agitation, restraints in place, vitals reviewed  MS: oriented x3, recent/remote memory impaired t, normal attention/concentration, language intact, no neglect.  CN: visual acuity grossly normal, PERRL, yellow eyes from jaundice, EOMI, no facial droop, no dysarthria  Motor: 5/5 throughout upper and lower extremities, " normal tone  Sensory exam: Normal to light touch throughout  Reflexes: 2+ throughout with downgoing plantars    DATA:    Lab Results   Component Value Date    GLUCOSE 99 12/08/2023    CALCIUM 9.1 12/08/2023     (L) 12/08/2023    K 3.4 (L) 12/08/2023    CO2 22.0 12/08/2023    CL 99 12/08/2023    BUN 12 12/08/2023    CREATININE 0.48 (L) 12/08/2023    EGFRIFNONA 134 12/03/2016    BCR 25.0 12/08/2023    ANIONGAP 12.0 12/08/2023     Lab Results   Component Value Date    WBC 5.87 12/08/2023    HGB 10.7 (L) 12/08/2023    HCT 30.9 (L) 12/08/2023    MCV 93.9 12/08/2023    PLT 34 (C) 12/08/2023    PLT 34 (C) 12/08/2023       Lab review: Sodium 133, BUN 12, creatinine 0.48, ammonia level elevated at 53, ALT 48, , alkaline phosphatase 293, platelet 34.    Imaging review: No brain imaging to review    Diagnoses:  -Generalized seizures which could be alcohol withdrawal versus epilepsy secondary to traumatic brain injury  -Alcohol withdrawal anxiety and agitation        PLAN:   1.  Continue thiamine replacement and CIWA protocol  2.  Routine EEG will follow on results  3.  MRI brain with and without contrast  4.  Hold on giving seizure medications right now  5.  Seizure precautions as detailed below  6.  If the routine EEG and brain MRI not revealing I would recommend prolonged EEG monitoring as an outpatient or EMU admission  7.  Follow-up with one of our epileptologist as an outpatient to arrange for the prolonged EEG.    Seizure Precautions:  ·    Do not drive. (this is to include: car, bicycle, or motorcycle)  ·    Do not drive operate dangerous equipment such as power tools, heavy machinery with moving parts, or an open flame.  ·    Do not swim alone (this would include a bathtub full of water is a small swimming pool).  ·    Avoid unsecured heights. (this is to include: scaffolding, ladders, trees, and roofs).    Discussed with patient, Dr. Goyal.    Medical Decision Making for this neurology consultation  consists of the following:  Review of previous chart, including H/P, provider and nursing notes as applicable.  Review of medications and vital signs.  Review of previous labs, neuroimaging, and additional relevant diagnostics.   Interpretation of laboratory, imaging, and other diagnostic results  Discussion with other providers and family   Total face-to-face/floor time: 60 minutes.

## 2023-12-08 NOTE — CONSULTS
.     REASON FOR CONSULTATION:     Provide an opinion on any further workup or treatment of severe thrombocytopenia.                              REQUESTING PHYSICIAN: Xavi Goyal MD      RECORDS OBTAINED:  Records of the patient's history including those obtained from the referring provider were reviewed and summarized in detail.    HISTORY OF PRESENT ILLNESS:  The patient is a 38 y.o. year old female with type 2 diabetes, PTSD, alcoholic liver cirrhosis, hypothyroidism, history of pancreatitis who was admitted to the hospital for seizure-like activity. We were consulted for severe thrombocytopenia. This patient presented to the [facility] Emergency Room on 12/06/2023. History was mostly provided by her  at the bedside. He reports the patient has been drinking alcohol, hard liquor, about half gallon a day. The last drink was about 2 days before her admission. She usually gets up in the very early morning and starts drinking alcohol. She drinks throughout the day. It appeared the patient had bit her tongue and was screaming unintelligently. Episode lasted about 3-5 minutes and immediately afterwards the patient seemed to be out of it and did not remember the episode. The patient was brought to [facility] Emergency Room after 911 was called. Because she bit her tongue she had some mild bleeding but not active bleeding now. No bleeding since being admitted. The patient also had some epistaxis and her  thought this happened in the past several months but not anything immediate prior or currently.     The patient is being admitted to the hospital for further management.     Laboratory study at the ER visit on 12/06/2023 reported severe thrombocytopenia with platelets 28,000. She was mildly anemic with hemoglobin 11.6, MCV 89.9, MCHC 34.3, and WBC 3120 including neutrophils 2370 and lymphocytes 4500. INR was 1.81 and PTT 21.3 with elevated , ALT 54, total bilirubin 8.4  and alkaline phosphatase 300. Renal function reporting creatinine 0.47; potassium 2.9, chloride 94, sodium 136 and calcium 9.1.     Today laboratory study reported platelets 25,000 with hemoglobin 10.0, MCV 92.9, MCHC 34.7. Normalized WBC at 5810 including neutrophils 4560 and lymphocytes 750. Chemistry lab reported total bilirubin 7.7 with direct bilirubin 6.7 and indirect bilirubin 1.0. Elevated but improved , ALT 45, alkaline phosphatase 248. Maintains creatinine 0.43, normalized potassium chloride; however, sodium 133. Glucose elevated at 304. Calcium is slightly low 8.2 with albumin 3.3 and total protein 5.9.     I obtained platelets and IPF which reported stable platelets 25,000 with IPF 16.4%. LDH was also elevated at 238.     Since admission this patient has been put on supplement with electrolytes and also vitamin B1 injection as well as insulin due to her diabetes. She also has been put on folic acid 1 mg daily. Due to her elevated MELD score the patient was started on prednisolone at 40 mg daily.      The patient was seen by GI service today who requested a stool occult blood test as well as CT scan for abdomen for further assessment.     This patient previously receives most of her care from the local [facility].       Past Medical History:   Diagnosis Date    Alcohol abuse with alcohol-induced anxiety disorder     Anxiety     Depression     Disease of thyroid gland     Status post alcohol detoxification      Past Surgical History:   Procedure Laterality Date     SECTION      x2    TONSILLECTOMY         MEDICATIONS    Current Facility-Administered Medications:     acetaminophen (TYLENOL) tablet 650 mg, 650 mg, Oral, Q4H PRN **OR** acetaminophen (TYLENOL) 160 MG/5ML oral solution 650 mg, 650 mg, Oral, Q4H PRN **OR** acetaminophen (TYLENOL) suppository 650 mg, 650 mg, Rectal, Q4H PRN, Amina Buchanan MD    sennosides-docusate (PERICOLACE) 8.6-50 MG per tablet 2 tablet, 2 tablet,  Oral, BID PRN **AND** polyethylene glycol (MIRALAX) packet 17 g, 17 g, Oral, Daily PRN **AND** bisacodyl (DULCOLAX) EC tablet 5 mg, 5 mg, Oral, Daily PRN **AND** bisacodyl (DULCOLAX) suppository 10 mg, 10 mg, Rectal, Daily PRN, Amina Buchanan MD    dextrose (D50W) (25 g/50 mL) IV injection 25 g, 25 g, Intravenous, Q15 Min PRN, Amina Buchanan MD    dextrose (GLUTOSE) oral gel 15 g, 15 g, Oral, Q15 Min PRN, Amina Buchanan MD    folic acid (FOLVITE) tablet 1 mg, 1 mg, Oral, Daily, Amina Buchanan MD, 1 mg at 12/07/23 0830    glucagon (GLUCAGEN) injection 1 mg, 1 mg, Intramuscular, Q15 Min PRN, Amina Buchanan MD    insulin glargine (LANTUS, SEMGLEE) injection 10 Units, 10 Units, Subcutaneous, Nightly, Xavi Goyal MD    insulin lispro (HUMALOG/ADMELOG) injection 2-7 Units, 2-7 Units, Subcutaneous, 4x Daily AC & at Bedtime, Amina Buchanan MD, 4 Units at 12/07/23 1743    levothyroxine (SYNTHROID, LEVOTHROID) tablet 100 mcg, 100 mcg, Oral, Q AM, Amina Buchanan MD, 100 mcg at 12/07/23 0630    LORazepam (ATIVAN) tablet 1 mg, 1 mg, Oral, Q1H PRN, 1 mg at 12/07/23 1200 **OR** midazolam (VERSED) injection 2 mg, 2 mg, Intravenous, Q1H PRN, 2 mg at 12/07/23 1305 **OR** LORazepam (ATIVAN) tablet 2 mg, 2 mg, Oral, Q1H PRN **OR** midazolam (VERSED) injection 4 mg, 4 mg, Intravenous, Q1H PRN, 4 mg at 12/07/23 1739 **OR** midazolam (VERSED) injection 4 mg, 4 mg, Intravenous, Q15 Min PRN **OR** midazolam (VERSED) injection 4 mg, 4 mg, Intramuscular, Q15 Min PRN, Amina Buchanan MD    Magnesium Standard Dose Replacement - Follow Nurse / BPA Driven Protocol, , Does not apply, PRN, Mel Gentile MD    melatonin tablet 5 mg, 5 mg, Oral, Nightly PRN, Amina Buchanan MD    ondansetron (ZOFRAN) tablet 4 mg, 4 mg, Oral, Q6H PRN, 4 mg at 12/06/23 9066 **OR** ondansetron (ZOFRAN) injection 4 mg, 4 mg, Intravenous, Q6H PRN, Amina Buchanan MD    pantoprazole (PROTONIX) EC tablet 40 mg,  40 mg, Oral, BID AC, Petra Maier MD    Potassium Replacement - Follow Nurse / BPA Driven Protocol, , Does not apply, PRN, Mel Gentile MD    prednisoLONE sodium phosphate (ORAPRED) 15 MG/5ML oral solution 40 mg, 40 mg, Oral, Daily, Amina Buchanan MD, 40 mg at 12/07/23 0828    riFAXIMin (XIFAXAN) tablet 550 mg, 550 mg, Oral, Q12H, Petra Maier MD    [COMPLETED] Insert Peripheral IV, , , Once **AND** sodium chloride 0.9 % flush 10 mL, 10 mL, Intravenous, PRN, Mel Gentile MD    sodium chloride 0.9 % flush 10 mL, 10 mL, Intravenous, Q12H, Amina Buchanan MD, 10 mL at 12/07/23 0829    sodium chloride 0.9 % flush 10 mL, 10 mL, Intravenous, PRN, Amina Buchanan MD    sodium chloride 0.9 % infusion 40 mL, 40 mL, Intravenous, PRN, Amina Buchanan MD    sodium chloride 0.9 % infusion, 75 mL/hr, Intravenous, Continuous, Amina Buchanan MD, Stopped at 12/07/23 1425    thiamine (B-1) injection 200 mg, 200 mg, Intravenous, Q8H, 200 mg at 12/07/23 1434 **FOLLOWED BY** [START ON 12/12/2023] thiamine (VITAMIN B-1) tablet 100 mg, 100 mg, Oral, Daily, Amina Buchanan MD    ALLERGIES:   No Known Allergies    SOCIAL HISTORY:       Social History     Socioeconomic History    Marital status:    Tobacco Use    Smoking status: Never   Vaping Use    Vaping Use: Never used   Substance and Sexual Activity    Alcohol use: Yes     Comment: drinks vodka daily up to 1/2 fifth, last drink 10/30/16    Drug use: Defer    Sexual activity: Defer         FAMILY HISTORY:  Family History   Problem Relation Age of Onset    Alcohol abuse Father     Drug abuse Father     Alcohol abuse Maternal Grandfather     Depression Maternal Grandmother     Drug abuse Maternal Grandmother     Alcohol abuse Paternal Grandfather        REVIEW OF SYSTEMS:  Review of Systems  See HPI           Vitals:    12/07/23 0420 12/07/23 0901 12/07/23 1331 12/07/23 1707   BP: 114/87 110/77 108/83 125/93   BP Location:  Left arm Left arm Left arm Left arm   Patient Position: Lying Lying Lying Lying   Pulse: 103 97 106 104   Resp: 18 18 18 18   Temp: 99.1 °F (37.3 °C) 99.3 °F (37.4 °C) 98.2 °F (36.8 °C) 98.1 °F (36.7 °C)   TempSrc: Oral Oral Oral Oral   SpO2: 97% 100% 97% 95%   Weight:       Height:              No data to display               PHYSICAL EXAM:      CONSTITUTIONAL:  Vital signs reviewed.  No distress, looks comfortable.  EYES:  Conjunctivae and lids unremarkable.  PERRLA  EARS,NOSE,MOUTH,THROAT:  Ears and nose appear unremarkable.  Lips, teeth, gums appear unremarkable.  RESPIRATORY:  Normal respiratory effort.  Lungs clear to auscultation bilaterally.  CARDIOVASCULAR:  Normal S1, S2.  No murmurs rubs or gallops.  No significant lower extremity edema.  GASTROINTESTINAL: Abdomen appears unremarkable.  Nontender.  No hepatomegaly.  No splenomegaly.  LYMPHATIC:  No cervical, supraclavicular, axillary lymphadenopathy.  MUSCULOSKELETAL:  Unremarkable gait and station.  Unremarkable digits/nails.  No cyanosis or clubbing.  SKIN:  Warm.  No rashes.  PSYCHIATRIC:  Normal judgment and insight.  Normal mood and affect.      RECENT LABS:        WBC   Date Value Ref Range Status   12/07/2023 5.81 3.40 - 10.80 10*3/mm3 Final   12/06/2023 3.12 (L) 3.40 - 10.80 10*3/mm3 Final     Hemoglobin   Date Value Ref Range Status   12/07/2023 10.0 (L) 12.0 - 15.9 g/dL Final   12/06/2023 11.6 (L) 12.0 - 15.9 g/dL Final     Platelets   Date Value Ref Range Status   12/07/2023 25 (C) 140 - 450 10*3/mm3 Final   12/07/2023 25 (C) 140 - 450 10*3/mm3 Final   12/06/2023 28 (C) 140 - 450 10*3/mm3 Final     CMP:        Lab 12/07/23  0801 12/06/23  2343 12/06/23  1722   SODIUM 133*  --  136   POTASSIUM 4.2  4.2  --  2.9*   CHLORIDE 100  --  94*   CO2 22.7  --  28.0   ANION GAP 10.3  --  14.0   BUN 7  --  7   CREATININE 0.43*  --  0.47*   EGFR 127.1  --  124.4   GLUCOSE 304*  --  65   CALCIUM 8.2*  --  9.1   MAGNESIUM 2.7* 2.0 1.0*   PHOSPHORUS 2.4*  --    --    TOTAL PROTEIN 5.9*  --  7.1   ALBUMIN 3.3*  --  4.2   GLOBULIN  --   --  2.9   ALT (SGPT) 45*  --  54*   AST (SGOT) 216*  --  276*   BILIRUBIN 7.7*  --  8.4*   INDIRECT BILIRUBIN 1.0  --   --    BILIRUBIN DIRECT 6.7*  --   --    ALK PHOS 248*  --  300*     Lab Results   Component Value Date    [id number] 893 12/07/2023     Component      Latest Ref Rng 12/7/2023   IPF      0.90 - 6.50 % 16.40 (H)    Platelets      140 - 450 10*3/mm3 25 (LL)    LDH      135 - 214 U/L 238 (H)        Assessment & Plan       ASSESSMENT:   1. Questionable alcohol withdrawal seizure. The patient is being followed by and managed by primary team. She has been put on withdrawal precaution and also started on folic acid and vitamin B1 injection.     2. Alcoholic liver cirrhosis. History of esophageal varices. The patient has been seen by GI service who ordered CT scan for the abdomen and also stool occult blood for test.     3. Severe thrombocytopenia. This patient had normal platelets at 320,000 on 12/03/2016 when she had pancreatitis and hospitalized at [facility]. She receives most of care from [facility] and we do not have lab results since that time. She presented with severe thrombocytopenia with platelets 28,000. We do not know exactly when she developed thrombocytopenia. Nevertheless, her thrombocytopenia is most likely related to several reasons including direct toxicity from alcohol consumption. She drinks half gallon of liquor a day and she also has liver cirrhosis which causes thrombocytopenia itself and she also might have splenomegaly due to liver cirrhosis which has been seen by CT scan. I tested her today with mildly elevated IPF at 16.4% with mildly elevated LDH at 238. B12 level came back as normal at 893. I am not sure whether the patient is having ITP which cannot be excluded. She also has been started on prednisolone so will see whether she will respond to the treatment. We will repeat  platelets and IPF tomorrow morning for reassessment.     4. Mild anemia. Not surprising the patient is anemic due to her liver cirrhosis and potential GI bleeding. Agree with testing stool for occult blood. I also will check her iron study tomorrow for further assessment. The patient already has been started on oral folic acid 1 mg daily so we will not check a level.     PLAN:   1. Continue to monitor CBC tomorrow morning.   2. Check IPF tomorrow morning.   3. We will check ferritin, iron profile for evaluation.   4. The patient will continue oral folic acid and also vitamin B1 injection and management of alcohol withdrawal seizure per primary team.   5. Pending CT scan for the abdomen.   6. If GI service plans to have EGD examination, I recommend to transfuse her with 2 units of platelets prior to the procedure or any procedure.     Discussed with the patient and mostly her  at bedside. I spoke with nursing staff today.    OPAL DOTSON M.D., Ph.D.

## 2023-12-08 NOTE — NURSING NOTE
Patient's CIWA score has been elevated for most of shift, assessed and medicated according to CIWA protocol.  Medication has only worked briefly. Patient continues to be agitated and anxious.  Bilateral wrist restraints in place and safety sitter at bedside.

## 2023-12-09 ENCOUNTER — APPOINTMENT (OUTPATIENT)
Dept: MRI IMAGING | Facility: HOSPITAL | Age: 38
DRG: 896 | End: 2023-12-09
Payer: OTHER GOVERNMENT

## 2023-12-09 PROBLEM — E11.65 TYPE 2 DIABETES MELLITUS WITH HYPERGLYCEMIA: Status: ACTIVE | Noted: 2023-12-06

## 2023-12-09 LAB
ALBUMIN SERPL-MCNC: 3.1 G/DL (ref 3.5–5.2)
ALBUMIN/GLOB SERPL: 1.1 G/DL
ALP SERPL-CCNC: 250 U/L (ref 39–117)
ALT SERPL W P-5'-P-CCNC: 39 U/L (ref 1–33)
ANION GAP SERPL CALCULATED.3IONS-SCNC: 12.4 MMOL/L (ref 5–15)
AST SERPL-CCNC: 106 U/L (ref 1–32)
BASOPHILS # BLD AUTO: 0.01 10*3/MM3 (ref 0–0.2)
BASOPHILS NFR BLD AUTO: 0.2 % (ref 0–1.5)
BILIRUB SERPL-MCNC: 6.2 MG/DL (ref 0–1.2)
BUN SERPL-MCNC: 17 MG/DL (ref 6–20)
BUN/CREAT SERPL: 43.6 (ref 7–25)
CALCIUM SPEC-SCNC: 8.5 MG/DL (ref 8.6–10.5)
CHLORIDE SERPL-SCNC: 101 MMOL/L (ref 98–107)
CO2 SERPL-SCNC: 22.6 MMOL/L (ref 22–29)
CREAT SERPL-MCNC: 0.39 MG/DL (ref 0.57–1)
DEPRECATED RDW RBC AUTO: 46.5 FL (ref 37–54)
EGFRCR SERPLBLD CKD-EPI 2021: 130.1 ML/MIN/1.73
EOSINOPHIL # BLD AUTO: 0.01 10*3/MM3 (ref 0–0.4)
EOSINOPHIL NFR BLD AUTO: 0.2 % (ref 0.3–6.2)
ERYTHROCYTE [DISTWIDTH] IN BLOOD BY AUTOMATED COUNT: 14.2 % (ref 12.3–15.4)
GLIADIN PEPTIDE IGA SER-ACNC: 13 UNITS (ref 0–19)
GLIADIN PEPTIDE IGG SER-ACNC: 4 UNITS (ref 0–19)
GLOBULIN UR ELPH-MCNC: 2.9 GM/DL
GLUCOSE BLDC GLUCOMTR-MCNC: 223 MG/DL (ref 70–130)
GLUCOSE BLDC GLUCOMTR-MCNC: 230 MG/DL (ref 70–130)
GLUCOSE BLDC GLUCOMTR-MCNC: 247 MG/DL (ref 70–130)
GLUCOSE BLDC GLUCOMTR-MCNC: 308 MG/DL (ref 70–130)
GLUCOSE BLDC GLUCOMTR-MCNC: 386 MG/DL (ref 70–130)
GLUCOSE BLDC GLUCOMTR-MCNC: 419 MG/DL (ref 70–130)
GLUCOSE BLDC GLUCOMTR-MCNC: 421 MG/DL (ref 70–130)
GLUCOSE BLDC GLUCOMTR-MCNC: 426 MG/DL (ref 70–130)
GLUCOSE BLDC GLUCOMTR-MCNC: 441 MG/DL (ref 70–130)
GLUCOSE SERPL-MCNC: 243 MG/DL (ref 65–99)
HCT VFR BLD AUTO: 26.8 % (ref 34–46.6)
HGB BLD-MCNC: 9 G/DL (ref 12–15.9)
IGA SERPL-MCNC: 719 MG/DL (ref 87–352)
IMM GRANULOCYTES # BLD AUTO: 0.02 10*3/MM3 (ref 0–0.05)
IMM GRANULOCYTES NFR BLD AUTO: 0.4 % (ref 0–0.5)
INR PPP: 1.74 (ref 0.9–1.1)
INR PPP: 1.83 (ref 0.9–1.1)
LYMPHOCYTES # BLD AUTO: 1.04 10*3/MM3 (ref 0.7–3.1)
LYMPHOCYTES NFR BLD AUTO: 19.8 % (ref 19.6–45.3)
MAGNESIUM SERPL-MCNC: 1.7 MG/DL (ref 1.6–2.6)
MCH RBC QN AUTO: 30.7 PG (ref 26.6–33)
MCHC RBC AUTO-ENTMCNC: 33.6 G/DL (ref 31.5–35.7)
MCV RBC AUTO: 91.5 FL (ref 79–97)
MONOCYTES # BLD AUTO: 0.55 10*3/MM3 (ref 0.1–0.9)
MONOCYTES NFR BLD AUTO: 10.5 % (ref 5–12)
NEUTROPHILS NFR BLD AUTO: 3.62 10*3/MM3 (ref 1.7–7)
NEUTROPHILS NFR BLD AUTO: 68.9 % (ref 42.7–76)
NRBC BLD AUTO-RTO: 0 /100 WBC (ref 0–0.2)
PLATELET # BLD AUTO: 54 10*3/MM3 (ref 140–450)
PMV BLD AUTO: 12.5 FL (ref 6–12)
POTASSIUM SERPL-SCNC: 3.4 MMOL/L (ref 3.5–5.2)
PROT SERPL-MCNC: 6 G/DL (ref 6–8.5)
PROTHROMBIN TIME: 20.6 SECONDS (ref 11.7–14.2)
PROTHROMBIN TIME: 21.5 SECONDS (ref 11.7–14.2)
RBC # BLD AUTO: 2.93 10*6/MM3 (ref 3.77–5.28)
SODIUM SERPL-SCNC: 136 MMOL/L (ref 136–145)
TTG IGA SER-ACNC: 2 U/ML (ref 0–3)
TTG IGG SER-ACNC: <2 U/ML (ref 0–5)
WBC NRBC COR # BLD AUTO: 5.25 10*3/MM3 (ref 3.4–10.8)

## 2023-12-09 PROCEDURE — 99232 SBSQ HOSP IP/OBS MODERATE 35: CPT | Performed by: INTERNAL MEDICINE

## 2023-12-09 PROCEDURE — 63710000001 INSULIN LISPRO (HUMAN) PER 5 UNITS: Performed by: STUDENT IN AN ORGANIZED HEALTH CARE EDUCATION/TRAINING PROGRAM

## 2023-12-09 PROCEDURE — 80053 COMPREHEN METABOLIC PANEL: CPT | Performed by: HOSPITALIST

## 2023-12-09 PROCEDURE — 90791 PSYCH DIAGNOSTIC EVALUATION: CPT

## 2023-12-09 PROCEDURE — 70553 MRI BRAIN STEM W/O & W/DYE: CPT

## 2023-12-09 PROCEDURE — 63710000001 INSULIN LISPRO (HUMAN) PER 5 UNITS: Performed by: INTERNAL MEDICINE

## 2023-12-09 PROCEDURE — 25010000002 THIAMINE HCL 200 MG/2ML SOLUTION: Performed by: STUDENT IN AN ORGANIZED HEALTH CARE EDUCATION/TRAINING PROGRAM

## 2023-12-09 PROCEDURE — 25010000002 MIDAZOLAM PER 1 MG: Performed by: STUDENT IN AN ORGANIZED HEALTH CARE EDUCATION/TRAINING PROGRAM

## 2023-12-09 PROCEDURE — 63710000001 INSULIN GLARGINE PER 5 UNITS: Performed by: HOSPITALIST

## 2023-12-09 PROCEDURE — A9577 INJ MULTIHANCE: HCPCS | Performed by: INTERNAL MEDICINE

## 2023-12-09 PROCEDURE — 85025 COMPLETE CBC W/AUTO DIFF WBC: CPT | Performed by: HOSPITALIST

## 2023-12-09 PROCEDURE — 82948 REAGENT STRIP/BLOOD GLUCOSE: CPT

## 2023-12-09 PROCEDURE — 85610 PROTHROMBIN TIME: CPT | Performed by: INTERNAL MEDICINE

## 2023-12-09 PROCEDURE — 83735 ASSAY OF MAGNESIUM: CPT | Performed by: INTERNAL MEDICINE

## 2023-12-09 PROCEDURE — 0 GADOBENATE DIMEGLUMINE 529 MG/ML SOLUTION: Performed by: INTERNAL MEDICINE

## 2023-12-09 PROCEDURE — 63710000001 PREDNISOLONE PER 5 MG: Performed by: STUDENT IN AN ORGANIZED HEALTH CARE EDUCATION/TRAINING PROGRAM

## 2023-12-09 RX ORDER — INSULIN LISPRO 100 [IU]/ML
7 INJECTION, SOLUTION INTRAVENOUS; SUBCUTANEOUS ONCE
Status: COMPLETED | OUTPATIENT
Start: 2023-12-09 | End: 2023-12-10

## 2023-12-09 RX ORDER — INSULIN LISPRO 100 [IU]/ML
3 INJECTION, SOLUTION INTRAVENOUS; SUBCUTANEOUS
Status: DISCONTINUED | OUTPATIENT
Start: 2023-12-09 | End: 2023-12-10

## 2023-12-09 RX ORDER — TRAMADOL HYDROCHLORIDE 50 MG/1
50 TABLET ORAL EVERY 6 HOURS PRN
Status: DISCONTINUED | OUTPATIENT
Start: 2023-12-09 | End: 2023-12-12 | Stop reason: HOSPADM

## 2023-12-09 RX ADMIN — LACTULOSE 30 G: 10 SOLUTION ORAL at 08:36

## 2023-12-09 RX ADMIN — Medication 10 ML: at 08:37

## 2023-12-09 RX ADMIN — PANTOPRAZOLE SODIUM 40 MG: 40 TABLET, DELAYED RELEASE ORAL at 08:36

## 2023-12-09 RX ADMIN — INSULIN LISPRO 6 UNITS: 100 INJECTION, SOLUTION INTRAVENOUS; SUBCUTANEOUS at 18:34

## 2023-12-09 RX ADMIN — PREDNISOLONE SODIUM PHOSPHATE 40 MG: 15 SOLUTION ORAL at 08:35

## 2023-12-09 RX ADMIN — MIDAZOLAM HYDROCHLORIDE 4 MG: 2 INJECTION, SOLUTION INTRAMUSCULAR; INTRAVENOUS at 03:29

## 2023-12-09 RX ADMIN — RIFAXIMIN 550 MG: 550 TABLET ORAL at 21:12

## 2023-12-09 RX ADMIN — INSULIN LISPRO 5 UNITS: 100 INJECTION, SOLUTION INTRAVENOUS; SUBCUTANEOUS at 13:24

## 2023-12-09 RX ADMIN — LACTULOSE 30 G: 10 SOLUTION ORAL at 21:11

## 2023-12-09 RX ADMIN — MIDAZOLAM HYDROCHLORIDE 2 MG: 2 INJECTION, SOLUTION INTRAMUSCULAR; INTRAVENOUS at 23:02

## 2023-12-09 RX ADMIN — THIAMINE HYDROCHLORIDE 200 MG: 100 INJECTION, SOLUTION INTRAMUSCULAR; INTRAVENOUS at 13:25

## 2023-12-09 RX ADMIN — LEVOTHYROXINE SODIUM 100 MCG: 100 TABLET ORAL at 05:22

## 2023-12-09 RX ADMIN — GADOBENATE DIMEGLUMINE 10 ML: 529 INJECTION, SOLUTION INTRAVENOUS at 19:25

## 2023-12-09 RX ADMIN — LORAZEPAM 2 MG: 1 TABLET ORAL at 00:47

## 2023-12-09 RX ADMIN — INSULIN LISPRO 3 UNITS: 100 INJECTION, SOLUTION INTRAVENOUS; SUBCUTANEOUS at 08:49

## 2023-12-09 RX ADMIN — MIDAZOLAM HYDROCHLORIDE 4 MG: 2 INJECTION, SOLUTION INTRAMUSCULAR; INTRAVENOUS at 08:35

## 2023-12-09 RX ADMIN — THIAMINE HYDROCHLORIDE 200 MG: 100 INJECTION, SOLUTION INTRAMUSCULAR; INTRAVENOUS at 21:10

## 2023-12-09 RX ADMIN — LORAZEPAM 2 MG: 1 TABLET ORAL at 01:42

## 2023-12-09 RX ADMIN — TRAMADOL HYDROCHLORIDE 50 MG: 50 TABLET ORAL at 13:35

## 2023-12-09 RX ADMIN — THIAMINE HYDROCHLORIDE 200 MG: 100 INJECTION, SOLUTION INTRAMUSCULAR; INTRAVENOUS at 05:22

## 2023-12-09 RX ADMIN — Medication 10 ML: at 21:12

## 2023-12-09 RX ADMIN — MIDAZOLAM HYDROCHLORIDE 4 MG: 2 INJECTION, SOLUTION INTRAMUSCULAR; INTRAVENOUS at 02:23

## 2023-12-09 RX ADMIN — INSULIN LISPRO 3 UNITS: 100 INJECTION, SOLUTION INTRAVENOUS; SUBCUTANEOUS at 18:35

## 2023-12-09 RX ADMIN — INSULIN GLARGINE 10 UNITS: 100 INJECTION, SOLUTION SUBCUTANEOUS at 21:12

## 2023-12-09 RX ADMIN — PANTOPRAZOLE SODIUM 40 MG: 40 TABLET, DELAYED RELEASE ORAL at 18:34

## 2023-12-09 RX ADMIN — RIFAXIMIN 550 MG: 550 TABLET ORAL at 08:36

## 2023-12-09 RX ADMIN — INSULIN LISPRO 7 UNITS: 100 INJECTION, SOLUTION INTRAVENOUS; SUBCUTANEOUS at 21:12

## 2023-12-09 RX ADMIN — FOLIC ACID 1 MG: 1 TABLET ORAL at 08:36

## 2023-12-09 NOTE — PROGRESS NOTES
EEG showed excessive beta which is likely medication side effect, no seizures or epileptiform discharges.    MRI brain with and without contrast epilepsy protocol ordered pending results.  Further recommendation after the MRI brain.

## 2023-12-09 NOTE — PROGRESS NOTES
Name: Corinna Almonte ADMIT: 2023   : 1985  PCP: System, Provider Not In    MRN: [id number] LOS: 3 days   AGE/SEX: 38 y.o. female  ROOM: [facility]     Subjective   Subjective   No acute events. Patient has had some abdominal discomfort. She is lethargic and does not speak to me much. Taking little PO.    Objective   Objective   Vital Signs  Temp:  [98.1 °F (36.7 °C)-98.6 °F (37 °C)] 98.6 °F (37 °C)  Heart Rate:  [] 104  Resp:  [18] 18  BP: (110-125)/(76-91) 114/83  SpO2:  [98 %-100 %] 98 %  on   ;   Device (Oxygen Therapy): room air  Body mass index is 20.13 kg/m².  Physical Exam  Vitals and nursing note reviewed.   Constitutional:       General: She is not in acute distress.     Appearance: She is ill-appearing. She is not toxic-appearing or diaphoretic.   HENT:      Head: Normocephalic and atraumatic.      Nose: Nose normal.      Mouth/Throat:      Mouth: Mucous membranes are moist.      Pharynx: Oropharynx is clear.   Eyes:      Conjunctiva/sclera: Conjunctivae normal.      Pupils: Pupils are equal, round, and reactive to light.   Cardiovascular:      Rate and Rhythm: Normal rate and regular rhythm.      Pulses: Normal pulses.   Pulmonary:      Effort: Pulmonary effort is normal.      Breath sounds: Normal breath sounds.   Abdominal:      General: Bowel sounds are normal. There is no distension.      Palpations: Abdomen is soft.      Tenderness: There is abdominal tenderness (mild, epigastric). There is no guarding or rebound.   Musculoskeletal:         General: No swelling or tenderness.      Cervical back: Neck supple.   Skin:     General: Skin is warm and dry.      Capillary Refill: Capillary refill takes less than 2 seconds.   Neurological:      General: No focal deficit present.      Mental Status: She is alert.       Results Review     I reviewed the patient's new clinical results.  Results from last 7 days   Lab Units 23  0652 23  0630 23  1703 23  0801  12/06/23  1722   WBC 10*3/mm3 5.25 5.87  --  5.81 3.12*   HEMOGLOBIN g/dL 9.0* 10.7*  --  10.0* 11.6*   PLATELETS 10*3/mm3 54* 34*  34* 25* 25* 28*     Results from last 7 days   Lab Units 12/09/23  0652 12/08/23  1618 12/08/23  0630 12/07/23  0801 12/06/23  1722   SODIUM mmol/L 136  --  133* 133* 136   POTASSIUM mmol/L 3.4* 4.0 3.4* 4.2  4.2 2.9*   CHLORIDE mmol/L 101  --  99 100 94*   CO2 mmol/L 22.6  --  22.0 22.7 28.0   BUN mg/dL 17  --  12 7 7   CREATININE mg/dL 0.39*  --  0.48* 0.43* 0.47*   GLUCOSE mg/dL 243*  --  99 304* 65   EGFR mL/min/1.73 130.1  --  123.7 127.1 124.4     Results from last 7 days   Lab Units 12/09/23  0652 12/08/23  0630 12/07/23  0801 12/06/23  1722   ALBUMIN g/dL 3.1* 3.9 3.3* 4.2   BILIRUBIN mg/dL 6.2* 8.0* 7.7* 8.4*   ALK PHOS U/L 250* 293* 248* 300*   AST (SGOT) U/L 106* 159* 216* 276*   ALT (SGPT) U/L 39* 48* 45* 54*     Results from last 7 days   Lab Units 12/09/23  0652 12/08/23  0630 12/07/23  0801 12/06/23  2343 12/06/23  1722   CALCIUM mg/dL 8.5* 9.1 8.2*  --  9.1   ALBUMIN g/dL 3.1* 3.9 3.3*  --  4.2   MAGNESIUM mg/dL 1.7  --  2.7* 2.0 1.0*   PHOSPHORUS mg/dL  --   --  2.4*  --   --        Hemoglobin A1C   Date/Time Value Ref Range Status   12/07/2023 0801 7.90 (H) 4.80 - 5.60 % Final     Glucose   Date/Time Value Ref Range Status   12/09/2023 1208 308 (H) 70 - 130 mg/dL Final   12/09/2023 0801 223 (H) 70 - 130 mg/dL Final   12/09/2023 0632 230 (H) 70 - 130 mg/dL Final   12/09/2023 0057 247 (H) 70 - 130 mg/dL Final   12/08/2023 2220 452 (C) 70 - 130 mg/dL Final   12/08/2023 2218 449 (C) 70 - 130 mg/dL Final   12/08/2023 2015 483 (C) 70 - 130 mg/dL Final       No radiology results for the last day    I have personally reviewed all medications:  Scheduled Medications  folic acid, 1 mg, Oral, Daily  insulin glargine, 10 Units, Subcutaneous, Nightly  insulin lispro, 2-7 Units, Subcutaneous, 4x Daily AC & at Bedtime  lactulose, 30 g, Oral, BID  levothyroxine, 100 mcg, Oral, Q  AM  pantoprazole, 40 mg, Oral, BID AC  prednisoLONE sodium phosphate, 40 mg, Oral, Daily  rifAXIMin, 550 mg, Oral, Q12H  sodium chloride, 10 mL, Intravenous, Q12H  thiamine (B-1) IV, 200 mg, Intravenous, Q8H   Followed by  [START ON 12/12/2023] thiamine, 100 mg, Oral, Daily    Infusions  sodium chloride, 75 mL/hr, Last Rate: Stopped (12/07/23 1425)    Diet  Diet: Diabetic Diets; Consistent Carbohydrate; Texture: Regular Texture (IDDSI 7); Fluid Consistency: Thin (IDDSI 0)    I have personally reviewed:  [x]  Laboratory   []  Microbiology   [x]  Radiology   [x]  EKG/Telemetry  []  Cardiology/Vascular   []  Pathology    [x]  Records      Assessment/Plan     Active Hospital Problems    Diagnosis  POA    **Alcohol withdrawal seizure [F10.939, R56.9]  Yes    Coagulopathy [D68.9]  Yes    Severe malnutrition [E43]  Yes    Thrombocytopenia [D69.6]  Yes    Anemia [D64.9]  Yes    Alcoholic hepatitis [K70.10]  Yes    Alcoholic cirrhosis [K70.30]  Yes    Esophageal varices [I85.00]  Yes    Pancytopenia [D61.818]  Yes    Hypomagnesemia [E83.42]  Yes    Hypokalemia [E87.6]  Yes    Type 2 diabetes mellitus with hyperglycemia [E11.65]  Yes    Anxiety [F41.9]  Yes    Depression [F32.A]  Yes      Resolved Hospital Problems   No resolved problems to display.   EtOH Withdrawal/Seizure  - EEG showed no epileptiform discharges, brain MRI pending  - continue on EtOH withdrawal protocol, no plans for AEDs at this point per neurology, appreciate recs  - continue PRN versed per CIWA protocol, vitamin replacement  - continue seizure precautions  - follow electrolytes and replace as needed    Alcoholic Hepatitis/Alcoholic Cirrhosis  - has hx of GI bleeding/esophageal varices and PSE-continue on lactulose and rifaximin  - continue ppi  - prednisolone started for alcoholic hepatitis  - has secondary pancytopenia, will follow  - appreciate GI recs    Diabetes Mellitus  - likely type 2 but she is probably insulin deficient to some degree given her  chronic pancreatitis, AG is normal  - BG are elevated but she has had a tendency for labile blood sugars  - continue on lantus 10 units nightly  - add lispro 3 units TID with meals  - cover with ssi/hypoglycemia protocol    Abdominal Pain  - likely from above issue  - hold off on tylenol for now given her acute liver issues though contraindication is relative  - tramadol PRN    SCDs for DVT prophylaxis.  Full code.  Discussed with patient and nursing staff.  Anticipate discharge  TBD  timing yet to be determined.      Rocael Driver MD  [facility]  12/09/23  13:07 EST

## 2023-12-09 NOTE — PLAN OF CARE
Problem: Skin Injury Risk Increased  Goal: Skin Health and Integrity  Outcome: Ongoing, Progressing  Intervention: Optimize Skin Protection  Recent Flowsheet Documentation  Taken 12/9/2023 0043 by Allison Jackson RN  Head of Bed ([facility]) Positioning: HOB at 20 degrees  Taken 12/8/2023 2235 by Allison Jackson RN  Head of Bed ([facility]) Positioning: HOB at 20 degrees     Problem: Diabetes Comorbidity  Goal: Blood Glucose Level Within Targeted Range  Outcome: Ongoing, Progressing     Problem: Hypertension Comorbidity  Goal: Blood Pressure in Desired Range  Outcome: Ongoing, Progressing  Intervention: Maintain Blood Pressure Management  Recent Flowsheet Documentation  Taken 12/9/2023 0043 by Allison Jackson RN  Medication Review/Management: medications reviewed  Taken 12/8/2023 2235 by Allison Jackson RN  Medication Review/Management: medications reviewed     Problem: Seizure Disorder Comorbidity  Goal: Maintenance of Seizure Control  Outcome: Ongoing, Progressing     Problem: Fall Injury Risk  Goal: Absence of Fall and Fall-Related Injury  Outcome: Ongoing, Progressing  Intervention: Identify and Manage Contributors  Recent Flowsheet Documentation  Taken 12/9/2023 0043 by Allison Jackson RN  Medication Review/Management: medications reviewed  Taken 12/8/2023 2235 by Allison Jackson RN  Medication Review/Management: medications reviewed  Intervention: Promote Injury-Free Environment  Recent Flowsheet Documentation  Taken 12/9/2023 0043 by Allison Jackson RN  Safety Promotion/Fall Prevention: safety round/check completed  Taken 12/8/2023 2235 by Allison Jackson RN  Safety Promotion/Fall Prevention: safety round/check completed     Problem: Adult Inpatient Plan of Care  Goal: Plan of Care Review  Outcome: Ongoing, Progressing  Flowsheets (Taken 12/9/2023 0130)  Plan of Care Reviewed With: patient  Goal: Patient-Specific Goal (Individualized)  Outcome: Ongoing, Progressing  Goal: Absence of Hospital-Acquired Illness or  Injury  Outcome: Ongoing, Progressing  Intervention: Identify and Manage Fall Risk  Recent Flowsheet Documentation  Taken 12/9/2023 0043 by Allison Jackson RN  Safety Promotion/Fall Prevention: safety round/check completed  Taken 12/8/2023 2235 by Allison Jackson RN  Safety Promotion/Fall Prevention: safety round/check completed  Intervention: Prevent Skin Injury  Recent Flowsheet Documentation  Taken 12/9/2023 0043 by Allison Jackson RN  Body Position: position changed independently  Taken 12/8/2023 2235 by Allison Jackson RN  Body Position: position changed independently  Goal: Optimal Comfort and Wellbeing  Outcome: Ongoing, Progressing  Goal: Readiness for Transition of Care  Outcome: Ongoing, Progressing     Problem: Alcohol Withdrawal  Goal: Alcohol Withdrawal Symptom Control  Outcome: Ongoing, Progressing     Problem: Acute Neurologic Deterioration (Alcohol Withdrawal)  Goal: Optimal Neurologic Function  Outcome: Ongoing, Progressing     Problem: Substance Misuse (Alcohol Withdrawal)  Goal: Readiness for Change Identified  Outcome: Ongoing, Progressing   Goal Outcome Evaluation:  Plan of Care Reviewed With: patient

## 2023-12-09 NOTE — CONSULTS
"Patient seen by [facility] d/t ETOH. Patient interviewed in room 587 (5P) with  at bedside.  was not present during beginning of assessment but showed up shortly after. The patient gave permission for  to remain present. Introduced self and role. Patient agreed to be evaluated. Patient is only oriented to self. Patient is a poor historian and provides inconsistent information. The patient has a history of TBI from fall out of a helicopter while in the [employer]. The patient had been in bilateral wrist restraints that were discontinued on 12/8. Last CIWA 14 @ 0800. Patient had received multiple doses of Ativan and Versed throughout the night with last dose of versed at 0835. Patient seen by [facility] in 2016 for ETOH.    The patient is a 38 y.o.  female living with her . The patient stated she did not have a  and she was . The patient does have a past divorce but is currently . When asked who she lived with the patient originally answered she lived with \"Crystal\" who is her  today. The patient then stated she lived with her mom and dad. When asked to clarify the patient stated she lives alone. The patient's  stated that they live together.   Jew/Spiritual: No  Children: 2  Occupation: Disability  Hobbies: Spend time with her pets, Clean, Cook.   Education: The patient reported she has a Master's Degree  Legal: Denies  : [id number]  Support System: The patient named \"Crystal\" (). The patient's  reported she does not know the patient outside of the hospital. The patient stated her mom and dad used to be a support but her mom was diagnosed with ovarian cancer so now she supports them. The patient's  reported the patient's mother passed away from ovarian cancer.  Hx of Violence: Denies  Hx of Abuse/Trauma: PTSD  Feel Safe at Home: Yes    The patient presented to the ED " on 12/6/23 after her  witnessed her having a seizure and called 911. The patient's  reported the patient had not been feeling well so she had not drank any ETOH in at least 1.5 days. BAL negative. The patient's  reported the patient has had 5 hospitalizations this year and at [facility] point [facility] The patient's  reported [facility] but [facility] brought her here. According to ED charting the [facility] was [facility] but [facility] were [facility].     The patient stated she typically drinks ETOH 3 days/week and typically drinks a 1/2 gallon of vodka throughout the day. Later when the  showed up he reported the patient drinks 7 days/week. The patient stated she began drinking at age 18 but was unable to state how long she has been drinking this much. The patient's last drink was on 12/4. The patient did sated she has memory loss but is not sure if it is from her TBI or ETOH use. According to [facility] charting from 2016 the patient did a 30 day rehab in [place] in May 2015. The patient stated she did not remember this. The patient's  reported he is not aware she went to rehab in [place] and stated it would of been difficult for her to do that because she lived in [place] from [id number]. The patient stated she has attended AA in the past but remember when. The patient denied any other ETOH treatment. The patient denied any other substance use. UDS negative.    The patient has a mental health history of anxiety, depression and PTSD. The patient is currently [facility] Kl[facility]ipin [facility]. The patient stated she does not take her Klonipin when drinking and the patient's  verified this. Patient was educated it is smart to not mix benzodiazepines with ETOH but the better choice would be to abstain from ETOH and take her Klonipin as prescribed. The patient's  reported the patient has a therapist she sees once a month  "[facility] but thinks in person would be better for her. The patient's  stated the patient lies frequently and it is hard for mental health providers to tell when she is lying over the phone. The patient denied any other mental health treatment.     The patient voiced her anxiety is better because her  is here now. The patient answered \"yeah, a little\" when asked about depression. The patient denied SI, wish to be dead, HI, or hallucinations.     The patient stated she was not sure about ETOH treatment at this time. Inpatient rehab was suggested to patient d/t her heavy ETOH use but the patient stated she is not going to do that. [facility] will follow and provide resources as needed.    "

## 2023-12-09 NOTE — PROGRESS NOTES
[facility]  Inpatient Progress Note    Reason for Follow Up: Cirrhosis, alcohol hepatitis    Subjective     Interval History:   Minimal abdominal pain, difficult to elicit history    Current Facility-Administered Medications:     sennosides-docusate (PERICOLACE) 8.6-50 MG per tablet 2 tablet, 2 tablet, Oral, BID PRN **AND** polyethylene glycol (MIRALAX) packet 17 g, 17 g, Oral, Daily PRN **AND** bisacodyl (DULCOLAX) EC tablet 5 mg, 5 mg, Oral, Daily PRN **AND** bisacodyl (DULCOLAX) suppository 10 mg, 10 mg, Rectal, Daily PRN, Amina Buchanan MD    dextrose (D50W) (25 g/50 mL) IV injection 25 g, 25 g, Intravenous, Q15 Min PRN, Amina Buchanan MD    dextrose (GLUTOSE) oral gel 15 g, 15 g, Oral, Q15 Min PRN, Amina Buchanan MD    folic acid (FOLVITE) tablet 1 mg, 1 mg, Oral, Daily, Amina Buchanan MD, 1 mg at 12/09/23 0836    glucagon (GLUCAGEN) injection 1 mg, 1 mg, Intramuscular, Q15 Min PRN, Amina Buchanan MD    insulin glargine (LANTUS, SEMGLEE) injection 10 Units, 10 Units, Subcutaneous, Nightly, Xavi Goyal MD, 10 Units at 12/08/23 2047    insulin lispro (HUMALOG/ADMELOG) injection 2-7 Units, 2-7 Units, Subcutaneous, 4x Daily AC & at Bedtime, Amina Buchanan MD, 5 Units at 12/09/23 1324    insulin lispro (HUMALOG/ADMELOG) injection 3 Units, 3 Units, Subcutaneous, TID With Meals, Rocael Driver MD    lactulose (CHRONULAC) 10 GM/15ML solution 30 g, 30 g, Oral, BID, Cally Gonsalves PA-C, 30 g at 12/09/23 0836    levothyroxine (SYNTHROID, LEVOTHROID) tablet 100 mcg, 100 mcg, Oral, Q AM, Amina Buchanan MD, 100 mcg at 12/09/23 0522    LORazepam (ATIVAN) tablet 1 mg, 1 mg, Oral, Q1H PRN, 1 mg at 12/07/23 1200 **OR** midazolam (VERSED) injection 2 mg, 2 mg, Intravenous, Q1H PRN, 2 mg at 12/07/23 2127 **OR** LORazepam (ATIVAN) tablet 2 mg, 2 mg, Oral, Q1H PRN, 2 mg at 12/09/23 0142 **OR** midazolam (VERSED) injection 4 mg, 4 mg, Intravenous, Q1H PRN, 4  mg at 12/09/23 0329 **OR** midazolam (VERSED) injection 4 mg, 4 mg, Intravenous, Q15 Min PRN, 4 mg at 12/09/23 0835 **OR** midazolam (VERSED) injection 4 mg, 4 mg, Intramuscular, Q15 Min PRN, Amina Buchanan MD    Magnesium Standard Dose Replacement - Follow Nurse / BPA Driven Protocol, , Does not apply, PRN, Mel Gentile MD    melatonin tablet 5 mg, 5 mg, Oral, Nightly PRN, Amina Buchanan MD, 5 mg at 12/08/23 1919    ondansetron (ZOFRAN) tablet 4 mg, 4 mg, Oral, Q6H PRN, 4 mg at 12/06/23 2245 **OR** ondansetron (ZOFRAN) injection 4 mg, 4 mg, Intravenous, Q6H PRN, Amina Buchanan MD    pantoprazole (PROTONIX) EC tablet 40 mg, 40 mg, Oral, BID AC, Petra Maier MD, 40 mg at 12/09/23 0836    Potassium Replacement - Follow Nurse / BPA Driven Protocol, , Does not apply, PRN, Mel Gentile MD    prednisoLONE sodium phosphate (ORAPRED) 15 MG/5ML oral solution 40 mg, 40 mg, Oral, Daily, Amina Buchanan MD, 40 mg at 12/09/23 0835    riFAXIMin (XIFAXAN) tablet 550 mg, 550 mg, Oral, Q12H, Petra Maier MD, 550 mg at 12/09/23 0836    [COMPLETED] Insert Peripheral IV, , , Once **AND** sodium chloride 0.9 % flush 10 mL, 10 mL, Intravenous, PRN, Mel Gentile MD    sodium chloride 0.9 % flush 10 mL, 10 mL, Intravenous, Q12H, Amina Buchanan MD, 10 mL at 12/09/23 0837    sodium chloride 0.9 % flush 10 mL, 10 mL, Intravenous, PRN, Amina Buchanan MD    sodium chloride 0.9 % infusion 40 mL, 40 mL, Intravenous, PRN, Amina Buchanan MD    sodium chloride 0.9 % infusion, 75 mL/hr, Intravenous, Continuous, Amina Buchanan MD, Stopped at 12/07/23 1425    thiamine (B-1) injection 200 mg, 200 mg, Intravenous, Q8H, 200 mg at 12/09/23 1325 **FOLLOWED BY** [START ON 12/12/2023] thiamine (VITAMIN B-1) tablet 100 mg, 100 mg, Oral, Daily, mAina Buchanan MD    traMADol (ULTRAM) tablet 50 mg, 50 mg, Oral, Q6H PRN, Rocael Driver MD  Review of Systems:    There is weakness  and fatigue all other systems reviewed and negative    Objective     Vital Signs  Temp:  [98.1 °F (36.7 °C)-98.6 °F (37 °C)] 98.6 °F (37 °C)  Heart Rate:  [] 104  Resp:  [18] 18  BP: (110-125)/(76-91) 114/83  Body mass index is 20.13 kg/m².    Intake/Output Summary (Last 24 hours) at 12/9/2023 1329  Last data filed at 12/9/2023 1244  Gross per 24 hour   Intake 500 ml   Output --   Net 500 ml     I/O this shift:  In: 500 [P.O.:500]  Out: -      Physical Exam:   General: patient awake, alert and cooperative   Eyes: Normal lids and lashes, no scleral icterus   Neck: supple, normal ROM   Skin: warm and dry, not jaundiced   Cardiovascular: regular rhythm and rate, no murmurs auscultated   Pulm: clear to auscultation bilaterally, regular and unlabored   Abdomen: soft, nontender, nondistended; normal bowel sounds   Extremities: no rash or edema   Psychiatric: Normal mood and behavior; memory intact     Results Review:     I reviewed the patient's new clinical results.    Results from last 7 days   Lab Units 12/09/23  0652 12/08/23  0630 12/07/23  1703 12/07/23  0801   WBC 10*3/mm3 5.25 5.87  --  5.81   HEMOGLOBIN g/dL 9.0* 10.7*  --  10.0*   HEMATOCRIT % 26.8* 30.9*  --  28.8*   PLATELETS 10*3/mm3 54* 34*  34* 25* 25*     Results from last 7 days   Lab Units 12/09/23  0652 12/08/23  1618 12/08/23  0630 12/07/23  0801   SODIUM mmol/L 136  --  133* 133*   POTASSIUM mmol/L 3.4* 4.0 3.4* 4.2  4.2   CHLORIDE mmol/L 101  --  99 100   CO2 mmol/L 22.6  --  22.0 22.7   BUN mg/dL 17  --  12 7   CREATININE mg/dL 0.39*  --  0.48* 0.43*   CALCIUM mg/dL 8.5*  --  9.1 8.2*   BILIRUBIN mg/dL 6.2*  --  8.0* 7.7*   ALK PHOS U/L 250*  --  293* 248*   ALT (SGPT) U/L 39*  --  48* 45*   AST (SGOT) U/L 106*  --  159* 216*   GLUCOSE mg/dL 243*  --  99 304*     Results from last 7 days   Lab Units 12/09/23  0652 12/08/23  0630 12/06/23  1722   INR  1.83* 1.99* 1.81*     Lab Results   Lab Value Date/Time    LIPASE 43 12/03/2016 0657     LIPASE 51 12/02/2016 0647    LIPASE 62 (H) 12/01/2016 0753    LIPASE 87 (H) 11/30/2016 1612       Radiology:  CT Abdomen Pelvis With Contrast   Final Result      MRI Brain With & Without Contrast    (Results Pending)       Assessment & Plan     Active Hospital Problems    Diagnosis     **Alcohol withdrawal seizure     Coagulopathy     Severe malnutrition     Thrombocytopenia     Anemia     Alcoholic hepatitis     Alcoholic cirrhosis     Esophageal varices     Pancytopenia     Hypomagnesemia     Hypokalemia     Type 2 diabetes mellitus with hyperglycemia     Anxiety     Depression        Assessment:  Decompensated cirrhosis with reported history of ascites, esophageal varices, and HE  [facility]  Elevated ammonia   AFP wnl  PT 22.9, INR 1.99   Alcoholic hepatitis/elevated LFTs - about the same today as yesterday   Hematemesis  Occurred after swallowing blood after biting lip/tongue  Alcohol abuse  Withdrawals         Plan:  Continue steroids for alcoholic hepatitis - continue - monitor LFTs. Check lille score at day 7-which will be Friday  CT reviewed, portal vein patent, GE junction varices noted.  Chronic pancreatitis.  Large stone pancreas had  Xifaxan 550 tid  Continue lactulose, Xifaxan and Protonix  Tolerating po diet well today - nutrition consulted  Globin trending downward no further episodes of hematemesis - Patient would ultimately benefit from EGD this admission, but will need optimization with regards to platelets first. It would also be best that she is done with withdrawals. If Hgb continues to drop or if she has further episodes of GI bleeding we can consider doing it sooner.   Advised alcohol cessation   She has chronic pancreatitis and a pancreas duct stone she will need to visit with an advanced biliary endoscopist after discharge, either Dr. Hemant Hodges or Cooper Lucas at the [facility]     I discussed the patients findings and my recommendations with patient and nursing  staff.    Yony Brooks MD

## 2023-12-09 NOTE — PLAN OF CARE
Problem: Skin Injury Risk Increased  Goal: Skin Health and Integrity  Outcome: Ongoing, Progressing  Intervention: Optimize Skin Protection  Recent Flowsheet Documentation  Taken 12/9/2023 0800 by Walter Bell RN  Pressure Reduction Techniques:   frequent weight shift encouraged   heels elevated off bed  Head of Bed (HOB) Positioning: HOB elevated  Pressure Reduction Devices: positioning supports utilized  Skin Protection: adhesive use limited     Problem: Diabetes Comorbidity  Goal: Blood Glucose Level Within Targeted Range  Outcome: Ongoing, Progressing     Problem: Hypertension Comorbidity  Goal: Blood Pressure in Desired Range  Outcome: Ongoing, Progressing  Intervention: Maintain Blood Pressure Management  Recent Flowsheet Documentation  Taken 12/9/2023 1800 by Walter Bell RN  Medication Review/Management: medications reviewed  Taken 12/9/2023 1601 by Walter Bell RN  Medication Review/Management: medications reviewed  Taken 12/9/2023 1201 by Walter Bell RN  Medication Review/Management: medications reviewed  Taken 12/9/2023 1000 by Walter Bell RN  Medication Review/Management: medications reviewed  Taken 12/9/2023 0800 by Walter Bell RN  Medication Review/Management: medications reviewed     Problem: Seizure Disorder Comorbidity  Goal: Maintenance of Seizure Control  Outcome: Ongoing, Progressing     Problem: Fall Injury Risk  Goal: Absence of Fall and Fall-Related Injury  Outcome: Ongoing, Progressing  Intervention: Identify and Manage Contributors  Recent Flowsheet Documentation  Taken 12/9/2023 1800 by Walter Bell RN  Medication Review/Management: medications reviewed  Taken 12/9/2023 1601 by Walter Bell RN  Medication Review/Management: medications reviewed  Taken 12/9/2023 1201 by Walter Bell RN  Medication Review/Management: medications reviewed  Taken 12/9/2023 1000 by Walter Bell RN  Medication Review/Management: medications reviewed  Taken  12/9/2023 0800 by Walter Bell RN  Medication Review/Management: medications reviewed  Self-Care Promotion: independence encouraged  Intervention: Promote Injury-Free Environment  Recent Flowsheet Documentation  Taken 12/9/2023 1800 by Walter Bell RN  Safety Promotion/Fall Prevention:   activity supervised   assistive device/personal items within reach   clutter free environment maintained   elopement precautions   fall prevention program maintained   gait belt   lighting adjusted   mobility aid in reach   muscle strengthening facilitated   nonskid shoes/slippers when out of bed   room organization consistent   safety round/check completed  Taken 12/9/2023 1601 by Walter Bell RN  Safety Promotion/Fall Prevention:   activity supervised   assistive device/personal items within reach   elopement precautions   clutter free environment maintained   fall prevention program maintained   gait belt   lighting adjusted   mobility aid in reach   muscle strengthening facilitated   nonskid shoes/slippers when out of bed   room organization consistent   safety round/check completed  Taken 12/9/2023 1401 by Walter Bell RN  Safety Promotion/Fall Prevention:   activity supervised   assistive device/personal items within reach   clutter free environment maintained   elopement precautions   fall prevention program maintained   gait belt   lighting adjusted   mobility aid in reach   muscle strengthening facilitated   nonskid shoes/slippers when out of bed   room organization consistent   safety round/check completed  Taken 12/9/2023 1201 by Walter Bell RN  Safety Promotion/Fall Prevention:   activity supervised   assistive device/personal items within reach   clutter free environment maintained   fall prevention program maintained   elopement precautions   gait belt   lighting adjusted   mobility aid in reach   muscle strengthening facilitated   nonskid shoes/slippers when out of bed   room organization  consistent   safety round/check completed  Taken 12/9/2023 1000 by Walter Bell RN  Safety Promotion/Fall Prevention:   activity supervised   assistive device/personal items within reach   clutter free environment maintained   elopement precautions   fall prevention program maintained   gait belt   lighting adjusted   mobility aid in reach   muscle strengthening facilitated   nonskid shoes/slippers when out of bed   room organization consistent   safety round/check completed  Taken 12/9/2023 0800 by Walter Bell RN  Safety Promotion/Fall Prevention:   activity supervised   assistive device/personal items within reach   clutter free environment maintained   elopement precautions   fall prevention program maintained   gait belt   lighting adjusted   mobility aid in reach   muscle strengthening facilitated   nonskid shoes/slippers when out of bed   room organization consistent   safety round/check completed     Problem: Adult Inpatient Plan of Care  Goal: Plan of Care Review  Outcome: Ongoing, Progressing  Flowsheets (Taken 12/9/2023 1848)  Plan of Care Reviewed With:   patient   spouse  Goal: Patient-Specific Goal (Individualized)  Outcome: Ongoing, Progressing  Goal: Absence of Hospital-Acquired Illness or Injury  Outcome: Ongoing, Progressing  Intervention: Identify and Manage Fall Risk  Recent Flowsheet Documentation  Taken 12/9/2023 1800 by Walter Bell RN  Safety Promotion/Fall Prevention:   activity supervised   assistive device/personal items within reach   clutter free environment maintained   elopement precautions   fall prevention program maintained   gait belt   lighting adjusted   mobility aid in reach   muscle strengthening facilitated   nonskid shoes/slippers when out of bed   room organization consistent   safety round/check completed  Taken 12/9/2023 1601 by Walter Bell RN  Safety Promotion/Fall Prevention:   activity supervised   assistive device/personal items within reach    elopement precautions   clutter free environment maintained   fall prevention program maintained   gait belt   lighting adjusted   mobility aid in reach   muscle strengthening facilitated   nonskid shoes/slippers when out of bed   room organization consistent   safety round/check completed  Taken 12/9/2023 1401 by Walter Bell RN  Safety Promotion/Fall Prevention:   activity supervised   assistive device/personal items within reach   clutter free environment maintained   elopement precautions   fall prevention program maintained   gait belt   lighting adjusted   mobility aid in reach   muscle strengthening facilitated   nonskid shoes/slippers when out of bed   room organization consistent   safety round/check completed  Taken 12/9/2023 1201 by Walter Bell RN  Safety Promotion/Fall Prevention:   activity supervised   assistive device/personal items within reach   clutter free environment maintained   fall prevention program maintained   elopement precautions   gait belt   lighting adjusted   mobility aid in reach   muscle strengthening facilitated   nonskid shoes/slippers when out of bed   room organization consistent   safety round/check completed  Taken 12/9/2023 1000 by Walter Bell RN  Safety Promotion/Fall Prevention:   activity supervised   assistive device/personal items within reach   clutter free environment maintained   elopement precautions   fall prevention program maintained   gait belt   lighting adjusted   mobility aid in reach   muscle strengthening facilitated   nonskid shoes/slippers when out of bed   room organization consistent   safety round/check completed  Taken 12/9/2023 0800 by Walter Bell RN  Safety Promotion/Fall Prevention:   activity supervised   assistive device/personal items within reach   clutter free environment maintained   elopement precautions   fall prevention program maintained   gait belt   lighting adjusted   mobility aid in reach   muscle strengthening  facilitated   nonskid shoes/slippers when out of bed   room organization consistent   safety round/check completed  Intervention: Prevent Skin Injury  Recent Flowsheet Documentation  Taken 12/9/2023 0800 by Walter Bell RN  Body Position: supine  Skin Protection: adhesive use limited  Intervention: Prevent and Manage VTE (Venous Thromboembolism) Risk  Recent Flowsheet Documentation  Taken 12/9/2023 0800 by Walter Bell RN  Activity Management: ambulated to bathroom  Goal: Optimal Comfort and Wellbeing  Outcome: Ongoing, Progressing  Intervention: Monitor Pain and Promote Comfort  Recent Flowsheet Documentation  Taken 12/9/2023 0800 by Walter Bell RN  Pain Management Interventions: relaxation techniques promoted  Intervention: Provide Person-Centered Care  Recent Flowsheet Documentation  Taken 12/9/2023 0800 by Walter Bell RN  Trust Relationship/Rapport:   care explained   choices provided   emotional support provided   empathic listening provided   questions answered   questions encouraged   reassurance provided   thoughts/feelings acknowledged  Goal: Readiness for Transition of Care  Outcome: Ongoing, Progressing     Problem: Alcohol Withdrawal  Goal: Alcohol Withdrawal Symptom Control  Outcome: Ongoing, Progressing     Problem: Acute Neurologic Deterioration (Alcohol Withdrawal)  Goal: Optimal Neurologic Function  Outcome: Ongoing, Progressing  Intervention: Minimize or Manage Acute Neurologic Symptoms  Recent Flowsheet Documentation  Taken 12/9/2023 0800 by Walter Bell RN  Cerebral Perfusion Promotion: blood pressure monitored     Problem: Substance Misuse (Alcohol Withdrawal)  Goal: Readiness for Change Identified  Outcome: Ongoing, Progressing  Intervention: Promote Psychosocial Wellbeing  Recent Flowsheet Documentation  Taken 12/9/2023 0800 by Walter Bell RN  Family/Support System Care: self-care encouraged   Goal Outcome Evaluation:  Plan of Care Reviewed With: patient,  spouse

## 2023-12-09 NOTE — PROGRESS NOTES
LOS: 3 days   Patient Care Team:  System, Provider Not In as PCP - General  Provider, No Known    Chief Complaint: Severe thrombocytopenia, alcoholic liver cirrhosis, alcohol withdrawal.    Interval History:  12/8/2023  No evidence of bleeding.  Patient is afebrile.  Slightly improved platelets 34,000.  No bowel movement so unable to test stool for occult blood.    12/9/2023  Patient is more coherent today.  She is no longer on wrist restriction.  No spontaneous bleeding.      A comprehensive 14 point review of systems was performed and was negative except as mentioned.    Vital Signs:  Temp:  [98.1 °F (36.7 °C)-98.6 °F (37 °C)] 98.6 °F (37 °C)  Heart Rate:  [] 104  Resp:  [18] 18  BP: (110-125)/(76-91) 114/83  No intake or output data in the 24 hours ending 12/09/23 0857      Physical Exam:   GENERAL:  Well-developed, well-nourished in no acute distress.  She is still very sleepy.  SKIN:  Warm, dry without rashes.  Right wrist has small bruise, likely from recent wrist restriction.  HEENT:  Normocephalic.   CHEST: Normal respiratory effort.  Lungs clear to auscultation. Good airflow.  CARDIAC:  Regular rate and rhythm without murmurs. Normal S1,S2.  ABDOMEN:  Soft.  Bowel sounds normal.  EXTREMITIES:  No lower extremity edema.      Results Review:   CBC:      Lab 12/09/23  0652 12/08/23  0630 12/07/23  1703 12/07/23  0801 12/06/23  1722   WBC 5.25 5.87  --  5.81 3.12*   HEMOGLOBIN 9.0* 10.7*  --  10.0* 11.6*   HEMATOCRIT 26.8* 30.9*  --  28.8* 33.8*   PLATELETS 54* 34*  34* 25* 25* 28*   NEUTROS ABS 3.62 4.11  --  4.56 2.37   IMMATURE GRANS (ABS) 0.02  --   --  0.04  --    LYMPHS ABS 1.04 1.26  --  0.75 0.45*   MONOS ABS 0.55 0.43  --  0.42 0.25   EOS ABS 0.01 0.02  --  0.02 0.00   MCV 91.5 93.9  --  92.9 89.9     CMP:        Lab 12/09/23  0652 12/08/23  1618 12/08/23  0630 12/07/23  0801 12/06/23  2343 12/06/23  1722   SODIUM 136  --  133* 133*  --  136   POTASSIUM 3.4* 4.0 3.4* 4.2  4.2  --  2.9*    CHLORIDE 101  --  99 100  --  94*   CO2 22.6  --  22.0 22.7  --  28.0   ANION GAP 12.4  --  12.0 10.3  --  14.0   BUN 17  --  12 7  --  7   CREATININE 0.39*  --  0.48* 0.43*  --  0.47*   EGFR 130.1  --  123.7 127.1  --  124.4   GLUCOSE 243*  --  99 304*  --  65   CALCIUM 8.5*  --  9.1 8.2*  --  9.1   MAGNESIUM  --   --   --  2.7* 2.0 1.0*   PHOSPHORUS  --   --   --  2.4*  --   --    TOTAL PROTEIN 6.0  --  6.7 5.9*  --  7.1   ALBUMIN 3.1*  --  3.9 3.3*  --  4.2   GLOBULIN 2.9  --  2.8  --   --  2.9   ALT (SGPT) 39*  --  48* 45*  --  54*   AST (SGOT) 106*  --  159* 216*  --  276*   BILIRUBIN 6.2*  --  8.0* 7.7*  --  8.4*   INDIRECT BILIRUBIN  --   --   --  1.0  --   --    BILIRUBIN DIRECT  --   --   --  6.7*  --   --    ALK PHOS 250*  --  293* 248*  --  300*       Results from last 7 days   Lab Units 12/06/23  1722   CK TOTAL U/L 92     Results from last 7 days   Lab Units 12/09/23  0652 12/08/23  0630 12/06/23  1722   INR  1.83* 1.99* 1.81*         Results from last 7 days   Lab Units 12/07/23  0801   MAGNESIUM mg/dL 2.7*     Lab Results   Component Value Date    IRON 43 12/08/2023    TIBC 231 (L) 12/08/2023    FERRITIN 579.00 (H) 12/08/2023     Lab Results   Component Value Date    [id number] 893 12/07/2023             I reviewed the patient's new clinical results.        Assessment:    1. Questionable alcohol withdrawal seizure. The patient is being followed by and managed by primary team. She has been put on withdrawal precaution and also started on folic acid and vitamin B1 injection.   12/9/2023 patient seems improved, no longer in the restriction.  Continue management by primary team.     2. Alcoholic liver cirrhosis. History of esophageal varices. The patient has been seen by GI service who ordered CT scan for the abdomen and also stool occult blood for test.   CT scan on 12/8/2023 reported hepatomegaly and fatty infiltration, no mention of liver cirrhosis.  She does have mild splenomegaly 14 cm and also  perigastric and the GE junction varices.  No evidence for portal vein thrombosis.     3. Severe thrombocytopenia.   This patient had normal platelets at 320,000 on 12/03/2016 when she had pancreatitis and hospitalized at [facility]. She receives most of care from [facility] and we do not have lab results since that time. She presented with severe thrombocytopenia with platelets 28,000. We do not know exactly when she developed thrombocytopenia. Nevertheless, her thrombocytopenia is most likely related to several reasons including direct toxicity from alcohol consumption. She drinks half gallon of liquor a day and she also has liver cirrhosis which causes thrombocytopenia itself and she also might have splenomegaly due to liver cirrhosis which has been seen by CT scan. I tested her today with mildly elevated IPF at 16.4% with mildly elevated LDH at 238. B12 level came back as normal at 893. I am not sure whether the patient is having ITP which cannot be excluded. She also has been started on prednisolone so will see whether she will respond to the treatment. We will repeat platelets and IPF tomorrow morning for reassessment.   12/8/2023 patient has slightly improved platelets 34,000, with IPF 16.3%.  No evidence for bleeding.  Patient may be responding to oral steroids.  Continue to monitor.  12/8/2023 CT scan reported hepatomegaly with fatty liver, spleen measures 14 cm, up from 12 cm previously in 2016.    12/9/2023 further improved the platelets at 54,000.  Continue to monitor.       4. Mild anemia.   Not surprising the patient is anemic due to her liver cirrhosis and potential GI bleeding. Agree with testing stool for occult blood. I also will check her iron study tomorrow for further assessment. The patient already has been started on oral folic acid 1 mg daily so we will not check a level.   12/8/2023 hemoglobin 10.7, iron study reporting ferritin 579, iron saturation 19% and normal B12  893.  Anemia is due to inflammatory reaction.  12/9/2023 hemoglobin 9.0.  No evidence for bleeding.  Continue to monitor.     5.  Coagulopathy.  This is due to liver cirrhosis and poor nutrition with vitamin K deficiency.  INR 1.99 on 12/8/2023.  Due to increased risk for bleeding, will give patient a dose of vitamin K 5 mg to reverse INR.  12/9/2023 slightly improved INR 1.83.  No bleeding.  Continue to monitor.      PLAN:   Continue management for alcohol withdrawal/DT per primary team.  Continue oral prednisolone 40 mg daily.   The patient will continue oral folic acid and also vitamin B1 injection and management of alcohol withdrawal seizure per primary team.   If GI service plans to have EGD examination, I recommend to transfuse her with 2 units of platelets prior to the procedure or any procedure.   Continue to monitor CBC, IPF and INR tomorrow morning.     Examined the patient today.  She is more coherent today.  Spoke with nursing staff.        Nuha Banerjee MD PhD  12/09/23  08:57 EST

## 2023-12-10 LAB
ALBUMIN SERPL-MCNC: 3.8 G/DL (ref 3.5–5.2)
ALBUMIN/GLOB SERPL: 1.5 G/DL
ALP SERPL-CCNC: 317 U/L (ref 39–117)
ALT SERPL W P-5'-P-CCNC: 46 U/L (ref 1–33)
ANION GAP SERPL CALCULATED.3IONS-SCNC: 11 MMOL/L (ref 5–15)
AST SERPL-CCNC: 114 U/L (ref 1–32)
BASOPHILS # BLD AUTO: 0.01 10*3/MM3 (ref 0–0.2)
BASOPHILS NFR BLD AUTO: 0.2 % (ref 0–1.5)
BILIRUB SERPL-MCNC: 6.2 MG/DL (ref 0–1.2)
BUN SERPL-MCNC: 14 MG/DL (ref 6–20)
BUN/CREAT SERPL: 29.2 (ref 7–25)
CALCIUM SPEC-SCNC: 9.2 MG/DL (ref 8.6–10.5)
CHLORIDE SERPL-SCNC: 99 MMOL/L (ref 98–107)
CO2 SERPL-SCNC: 23 MMOL/L (ref 22–29)
CREAT SERPL-MCNC: 0.48 MG/DL (ref 0.57–1)
DEPRECATED RDW RBC AUTO: 50.1 FL (ref 37–54)
EGFRCR SERPLBLD CKD-EPI 2021: 123.7 ML/MIN/1.73
EOSINOPHIL # BLD AUTO: 0.02 10*3/MM3 (ref 0–0.4)
EOSINOPHIL NFR BLD AUTO: 0.4 % (ref 0.3–6.2)
ERYTHROCYTE [DISTWIDTH] IN BLOOD BY AUTOMATED COUNT: 14.5 % (ref 12.3–15.4)
GLOBULIN UR ELPH-MCNC: 2.6 GM/DL
GLUCOSE BLDC GLUCOMTR-MCNC: 178 MG/DL (ref 70–130)
GLUCOSE BLDC GLUCOMTR-MCNC: 270 MG/DL (ref 70–130)
GLUCOSE BLDC GLUCOMTR-MCNC: 291 MG/DL (ref 70–130)
GLUCOSE BLDC GLUCOMTR-MCNC: 324 MG/DL (ref 70–130)
GLUCOSE BLDC GLUCOMTR-MCNC: 404 MG/DL (ref 70–130)
GLUCOSE BLDC GLUCOMTR-MCNC: 423 MG/DL (ref 70–130)
GLUCOSE SERPL-MCNC: 433 MG/DL (ref 65–99)
HCT VFR BLD AUTO: 28.8 % (ref 34–46.6)
HGB BLD-MCNC: 10 G/DL (ref 12–15.9)
IMM GRANULOCYTES # BLD AUTO: 0.01 10*3/MM3 (ref 0–0.05)
IMM GRANULOCYTES NFR BLD AUTO: 0.2 % (ref 0–0.5)
INR PPP: 1.59 (ref 0.9–1.1)
LYMPHOCYTES # BLD AUTO: 1.17 10*3/MM3 (ref 0.7–3.1)
LYMPHOCYTES NFR BLD AUTO: 23 % (ref 19.6–45.3)
MAGNESIUM SERPL-MCNC: 1.5 MG/DL (ref 1.6–2.6)
MCH RBC QN AUTO: 33.1 PG (ref 26.6–33)
MCHC RBC AUTO-ENTMCNC: 34.7 G/DL (ref 31.5–35.7)
MCV RBC AUTO: 95.4 FL (ref 79–97)
MONOCYTES # BLD AUTO: 0.67 10*3/MM3 (ref 0.1–0.9)
MONOCYTES NFR BLD AUTO: 13.2 % (ref 5–12)
NEUTROPHILS NFR BLD AUTO: 3.21 10*3/MM3 (ref 1.7–7)
NEUTROPHILS NFR BLD AUTO: 63 % (ref 42.7–76)
NRBC BLD AUTO-RTO: 0 /100 WBC (ref 0–0.2)
PLATELET # BLD AUTO: 69 10*3/MM3 (ref 140–450)
PLATELET # BLD AUTO: 69 10*3/MM3 (ref 140–450)
PLATELETS.RETICULATED NFR BLD AUTO: 10.8 % (ref 0.9–6.5)
PMV BLD AUTO: 12.1 FL (ref 6–12)
POTASSIUM SERPL-SCNC: 3.5 MMOL/L (ref 3.5–5.2)
POTASSIUM SERPL-SCNC: 4.8 MMOL/L (ref 3.5–5.2)
PROT SERPL-MCNC: 6.4 G/DL (ref 6–8.5)
PROTHROMBIN TIME: 19.2 SECONDS (ref 11.7–14.2)
RBC # BLD AUTO: 3.02 10*6/MM3 (ref 3.77–5.28)
SODIUM SERPL-SCNC: 133 MMOL/L (ref 136–145)
WBC NRBC COR # BLD AUTO: 5.09 10*3/MM3 (ref 3.4–10.8)

## 2023-12-10 PROCEDURE — 63710000001 INSULIN LISPRO (HUMAN) PER 5 UNITS: Performed by: STUDENT IN AN ORGANIZED HEALTH CARE EDUCATION/TRAINING PROGRAM

## 2023-12-10 PROCEDURE — 99233 SBSQ HOSP IP/OBS HIGH 50: CPT | Performed by: NURSE PRACTITIONER

## 2023-12-10 PROCEDURE — 63710000001 INSULIN LISPRO (HUMAN) PER 5 UNITS: Performed by: NURSE PRACTITIONER

## 2023-12-10 PROCEDURE — 85055 RETICULATED PLATELET ASSAY: CPT | Performed by: INTERNAL MEDICINE

## 2023-12-10 PROCEDURE — 85025 COMPLETE CBC W/AUTO DIFF WBC: CPT | Performed by: HOSPITALIST

## 2023-12-10 PROCEDURE — 25010000002 MAGNESIUM SULFATE 2 GM/50ML SOLUTION: Performed by: INTERNAL MEDICINE

## 2023-12-10 PROCEDURE — 25010000002 THIAMINE HCL 200 MG/2ML SOLUTION: Performed by: STUDENT IN AN ORGANIZED HEALTH CARE EDUCATION/TRAINING PROGRAM

## 2023-12-10 PROCEDURE — 82948 REAGENT STRIP/BLOOD GLUCOSE: CPT

## 2023-12-10 PROCEDURE — 85610 PROTHROMBIN TIME: CPT | Performed by: INTERNAL MEDICINE

## 2023-12-10 PROCEDURE — 99232 SBSQ HOSP IP/OBS MODERATE 35: CPT | Performed by: INTERNAL MEDICINE

## 2023-12-10 PROCEDURE — 83735 ASSAY OF MAGNESIUM: CPT | Performed by: INTERNAL MEDICINE

## 2023-12-10 PROCEDURE — 63710000001 INSULIN LISPRO (HUMAN) PER 5 UNITS: Performed by: INTERNAL MEDICINE

## 2023-12-10 PROCEDURE — 63710000001 PREDNISOLONE PER 5 MG: Performed by: STUDENT IN AN ORGANIZED HEALTH CARE EDUCATION/TRAINING PROGRAM

## 2023-12-10 PROCEDURE — 80053 COMPREHEN METABOLIC PANEL: CPT | Performed by: HOSPITALIST

## 2023-12-10 PROCEDURE — 84132 ASSAY OF SERUM POTASSIUM: CPT | Performed by: INTERNAL MEDICINE

## 2023-12-10 PROCEDURE — 63710000001 INSULIN GLARGINE PER 5 UNITS: Performed by: INTERNAL MEDICINE

## 2023-12-10 RX ORDER — INSULIN LISPRO 100 [IU]/ML
7 INJECTION, SOLUTION INTRAVENOUS; SUBCUTANEOUS
Status: DISCONTINUED | OUTPATIENT
Start: 2023-12-10 | End: 2023-12-11

## 2023-12-10 RX ORDER — ACETAMINOPHEN 325 MG/1
650 TABLET ORAL ONCE
Status: DISCONTINUED | OUTPATIENT
Start: 2023-12-12 | End: 2023-12-12 | Stop reason: HOSPADM

## 2023-12-10 RX ORDER — POTASSIUM CHLORIDE 750 MG/1
40 TABLET, FILM COATED, EXTENDED RELEASE ORAL EVERY 4 HOURS
Status: COMPLETED | OUTPATIENT
Start: 2023-12-10 | End: 2023-12-10

## 2023-12-10 RX ORDER — ACETAMINOPHEN 160 MG/5ML
650 SOLUTION ORAL ONCE
Status: DISCONTINUED | OUTPATIENT
Start: 2023-12-10 | End: 2023-12-10

## 2023-12-10 RX ORDER — DICLOFENAC SODIUM 30 MG/G
2 GEL TOPICAL 2 TIMES DAILY PRN
Status: DISCONTINUED | OUTPATIENT
Start: 2023-12-10 | End: 2023-12-12 | Stop reason: HOSPADM

## 2023-12-10 RX ORDER — ACETAMINOPHEN 325 MG/1
650 TABLET ORAL ONCE
Status: DISCONTINUED | OUTPATIENT
Start: 2023-12-10 | End: 2023-12-10

## 2023-12-10 RX ORDER — ACETAMINOPHEN 650 MG/1
650 SUPPOSITORY RECTAL ONCE
Status: DISCONTINUED | OUTPATIENT
Start: 2023-12-12 | End: 2023-12-12 | Stop reason: HOSPADM

## 2023-12-10 RX ORDER — ACETAMINOPHEN 650 MG/1
650 SUPPOSITORY RECTAL ONCE
Status: DISCONTINUED | OUTPATIENT
Start: 2023-12-10 | End: 2023-12-10

## 2023-12-10 RX ORDER — DIPHENHYDRAMINE HYDROCHLORIDE 50 MG/ML
25 INJECTION INTRAMUSCULAR; INTRAVENOUS ONCE
Status: DISCONTINUED | OUTPATIENT
Start: 2023-12-12 | End: 2023-12-12 | Stop reason: HOSPADM

## 2023-12-10 RX ORDER — ACETAMINOPHEN 160 MG/5ML
650 SOLUTION ORAL ONCE
Status: DISCONTINUED | OUTPATIENT
Start: 2023-12-12 | End: 2023-12-12 | Stop reason: HOSPADM

## 2023-12-10 RX ORDER — DIPHENHYDRAMINE HCL 25 MG
25 CAPSULE ORAL ONCE
Status: DISCONTINUED | OUTPATIENT
Start: 2023-12-10 | End: 2023-12-10

## 2023-12-10 RX ORDER — DIPHENHYDRAMINE HYDROCHLORIDE 50 MG/ML
25 INJECTION INTRAMUSCULAR; INTRAVENOUS ONCE
Status: DISCONTINUED | OUTPATIENT
Start: 2023-12-10 | End: 2023-12-10

## 2023-12-10 RX ORDER — DIPHENHYDRAMINE HCL 25 MG
25 CAPSULE ORAL ONCE
Status: DISCONTINUED | OUTPATIENT
Start: 2023-12-12 | End: 2023-12-12 | Stop reason: HOSPADM

## 2023-12-10 RX ORDER — MAGNESIUM SULFATE HEPTAHYDRATE 40 MG/ML
2 INJECTION, SOLUTION INTRAVENOUS
Status: COMPLETED | OUTPATIENT
Start: 2023-12-10 | End: 2023-12-10

## 2023-12-10 RX ADMIN — THIAMINE HYDROCHLORIDE 200 MG: 100 INJECTION, SOLUTION INTRAMUSCULAR; INTRAVENOUS at 05:41

## 2023-12-10 RX ADMIN — TRAMADOL HYDROCHLORIDE 50 MG: 50 TABLET ORAL at 11:22

## 2023-12-10 RX ADMIN — FOLIC ACID 1 MG: 1 TABLET ORAL at 08:55

## 2023-12-10 RX ADMIN — LACTULOSE 30 G: 10 SOLUTION ORAL at 20:59

## 2023-12-10 RX ADMIN — POTASSIUM CHLORIDE 40 MEQ: 750 TABLET, EXTENDED RELEASE ORAL at 09:04

## 2023-12-10 RX ADMIN — MAGNESIUM SULFATE HEPTAHYDRATE 2 G: 40 INJECTION, SOLUTION INTRAVENOUS at 16:30

## 2023-12-10 RX ADMIN — INSULIN LISPRO 4 UNITS: 100 INJECTION, SOLUTION INTRAVENOUS; SUBCUTANEOUS at 17:22

## 2023-12-10 RX ADMIN — INSULIN LISPRO 7 UNITS: 100 INJECTION, SOLUTION INTRAVENOUS; SUBCUTANEOUS at 17:22

## 2023-12-10 RX ADMIN — INSULIN GLARGINE 10 UNITS: 100 INJECTION, SOLUTION SUBCUTANEOUS at 11:22

## 2023-12-10 RX ADMIN — MAGNESIUM SULFATE HEPTAHYDRATE 2 G: 40 INJECTION, SOLUTION INTRAVENOUS at 11:22

## 2023-12-10 RX ADMIN — PREDNISOLONE SODIUM PHOSPHATE 40 MG: 15 SOLUTION ORAL at 08:56

## 2023-12-10 RX ADMIN — PANTOPRAZOLE SODIUM 40 MG: 40 TABLET, DELAYED RELEASE ORAL at 08:55

## 2023-12-10 RX ADMIN — INSULIN LISPRO 4 UNITS: 100 INJECTION, SOLUTION INTRAVENOUS; SUBCUTANEOUS at 20:58

## 2023-12-10 RX ADMIN — PANTOPRAZOLE SODIUM 40 MG: 40 TABLET, DELAYED RELEASE ORAL at 16:29

## 2023-12-10 RX ADMIN — RIFAXIMIN 550 MG: 550 TABLET ORAL at 20:58

## 2023-12-10 RX ADMIN — LACTULOSE 30 G: 10 SOLUTION ORAL at 08:57

## 2023-12-10 RX ADMIN — LEVOTHYROXINE SODIUM 100 MCG: 100 TABLET ORAL at 05:41

## 2023-12-10 RX ADMIN — POTASSIUM CHLORIDE 40 MEQ: 750 TABLET, EXTENDED RELEASE ORAL at 14:34

## 2023-12-10 RX ADMIN — THIAMINE HYDROCHLORIDE 200 MG: 100 INJECTION, SOLUTION INTRAMUSCULAR; INTRAVENOUS at 14:34

## 2023-12-10 RX ADMIN — Medication 10 ML: at 08:57

## 2023-12-10 RX ADMIN — INSULIN LISPRO 7 UNITS: 100 INJECTION, SOLUTION INTRAVENOUS; SUBCUTANEOUS at 11:22

## 2023-12-10 RX ADMIN — INSULIN LISPRO 5 UNITS: 100 INJECTION, SOLUTION INTRAVENOUS; SUBCUTANEOUS at 00:10

## 2023-12-10 RX ADMIN — TRAMADOL HYDROCHLORIDE 50 MG: 50 TABLET ORAL at 16:29

## 2023-12-10 RX ADMIN — INSULIN LISPRO 2 UNITS: 100 INJECTION, SOLUTION INTRAVENOUS; SUBCUTANEOUS at 11:22

## 2023-12-10 RX ADMIN — Medication 10 ML: at 20:58

## 2023-12-10 RX ADMIN — LORAZEPAM 2 MG: 1 TABLET ORAL at 00:09

## 2023-12-10 RX ADMIN — INSULIN LISPRO 3 UNITS: 100 INJECTION, SOLUTION INTRAVENOUS; SUBCUTANEOUS at 07:41

## 2023-12-10 RX ADMIN — RIFAXIMIN 550 MG: 550 TABLET ORAL at 08:56

## 2023-12-10 RX ADMIN — MAGNESIUM SULFATE HEPTAHYDRATE 2 G: 40 INJECTION, SOLUTION INTRAVENOUS at 14:34

## 2023-12-10 RX ADMIN — THIAMINE HYDROCHLORIDE 200 MG: 100 INJECTION, SOLUTION INTRAMUSCULAR; INTRAVENOUS at 21:00

## 2023-12-10 RX ADMIN — INSULIN LISPRO 7 UNITS: 100 INJECTION, SOLUTION INTRAVENOUS; SUBCUTANEOUS at 07:42

## 2023-12-10 RX ADMIN — INSULIN GLARGINE 10 UNITS: 100 INJECTION, SOLUTION SUBCUTANEOUS at 20:58

## 2023-12-10 NOTE — PROGRESS NOTES
Name: Corinna Almonte ADMIT: 2023   : 1985  PCP: System, Provider Not In    MRN: [id number] LOS: 4 days   AGE/SEX: 38 y.o. female  ROOM: [facility]     Subjective   Subjective   No acute events. Patient is much more awake and interactive today. She states she feels much better. Taking PO. Denies nausea/vomiting.   Her  is at bedside.    Objective   Objective   Vital Signs  Temp:  [97.8 °F (36.6 °C)-98.6 °F (37 °C)] 98.2 °F (36.8 °C)  Heart Rate:  [] 109  Resp:  [18-20] 20  BP: (118-135)/() 128/93  SpO2:  [98 %-100 %] 98 %  on   ;   Device (Oxygen Therapy): room air  Body mass index is 20.13 kg/m².  Physical Exam  Vitals and nursing note reviewed.   Constitutional:       General: She is not in acute distress.     Appearance: She is ill-appearing. She is not toxic-appearing or diaphoretic.   HENT:      Head: Normocephalic and atraumatic.      Nose: Nose normal.      Mouth/Throat:      Mouth: Mucous membranes are moist.      Pharynx: Oropharynx is clear.   Eyes:      Conjunctiva/sclera: Conjunctivae normal.      Pupils: Pupils are equal, round, and reactive to light.   Cardiovascular:      Rate and Rhythm: Normal rate and regular rhythm.      Pulses: Normal pulses.   Pulmonary:      Effort: Pulmonary effort is normal.      Breath sounds: Normal breath sounds.   Abdominal:      General: Bowel sounds are normal. There is no distension.      Palpations: Abdomen is soft.      Tenderness: There is abdominal tenderness (mild, epigastric). There is no guarding or rebound.   Musculoskeletal:         General: No swelling or tenderness.      Cervical back: Neck supple.   Skin:     General: Skin is warm and dry.      Capillary Refill: Capillary refill takes less than 2 seconds.   Neurological:      General: No focal deficit present.      Mental Status: She is alert and oriented to person, place, and time.   Psychiatric:         Mood and Affect: Mood normal.         Behavior: Behavior normal.        Results Review     I reviewed the patient's new clinical results.  Results from last 7 days   Lab Units 12/10/23  0633 12/09/23  0652 12/08/23  0630 12/07/23  1703 12/07/23  0801   WBC 10*3/mm3 5.09 5.25 5.87  --  5.81   HEMOGLOBIN g/dL 10.0* 9.0* 10.7*  --  10.0*   PLATELETS 10*3/mm3 69*  69* 54* 34*  34* 25* 25*     Results from last 7 days   Lab Units 12/10/23  0633 12/09/23  0652 12/08/23  1618 12/08/23  0630 12/07/23  0801   SODIUM mmol/L 133* 136  --  133* 133*   POTASSIUM mmol/L 3.5 3.4* 4.0 3.4* 4.2  4.2   CHLORIDE mmol/L 99 101  --  99 100   CO2 mmol/L 23.0 22.6  --  22.0 22.7   BUN mg/dL 14 17  --  12 7   CREATININE mg/dL 0.48* 0.39*  --  0.48* 0.43*   GLUCOSE mg/dL 433* 243*  --  99 304*   EGFR mL/min/1.73 123.7 130.1  --  123.7 127.1     Results from last 7 days   Lab Units 12/10/23  0633 12/09/23  0652 12/08/23  0630 12/07/23  0801   ALBUMIN g/dL 3.8 3.1* 3.9 3.3*   BILIRUBIN mg/dL 6.2* 6.2* 8.0* 7.7*   ALK PHOS U/L 317* 250* 293* 248*   AST (SGOT) U/L 114* 106* 159* 216*   ALT (SGPT) U/L 46* 39* 48* 45*     Results from last 7 days   Lab Units 12/10/23  0633 12/09/23  0652 12/08/23  0630 12/07/23  0801 12/06/23  2343   CALCIUM mg/dL 9.2 8.5* 9.1 8.2*  --    ALBUMIN g/dL 3.8 3.1* 3.9 3.3*  --    MAGNESIUM mg/dL 1.5* 1.7  --  2.7* 2.0   PHOSPHORUS mg/dL  --   --   --  2.4*  --        Glucose   Date/Time Value Ref Range Status   12/10/2023 1111 178 (H) 70 - 130 mg/dL Final   12/10/2023 0707 404 (C) 70 - 130 mg/dL Final   12/10/2023 0706 423 (C) 70 - 130 mg/dL Final   12/10/2023 0005 324 (H) 70 - 130 mg/dL Final   12/09/2023 2224 426 (C) 70 - 130 mg/dL Final   12/09/2023 2140 441 (C) 70 - 130 mg/dL Final   12/09/2023 2100 421 (C) 70 - 130 mg/dL Final       MRI Brain With & Without Contrast    Result Date: 12/9/2023  1. No acute intracranial abnormality.   No abnormal enhancement.   This report was finalized on 12/9/2023 9:00 PM by Dr. Mary Jo Sanchez M.D on Workstation: [id number]       I have  personally reviewed all medications:  Scheduled Medications  folic acid, 1 mg, Oral, Daily  insulin glargine, 10 Units, Subcutaneous, Q12H  insulin lispro, 2-7 Units, Subcutaneous, 4x Daily AC & at Bedtime  insulin lispro, 7 Units, Subcutaneous, TID With Meals  lactulose, 30 g, Oral, BID  levothyroxine, 100 mcg, Oral, Q AM  magnesium sulfate, 2 g, Intravenous, Q2H  pantoprazole, 40 mg, Oral, BID AC  potassium chloride ER, 40 mEq, Oral, Q4H  prednisoLONE sodium phosphate, 40 mg, Oral, Daily  rifAXIMin, 550 mg, Oral, Q12H  sodium chloride, 10 mL, Intravenous, Q12H  thiamine (B-1) IV, 200 mg, Intravenous, Q8H   Followed by  [START ON 12/12/2023] thiamine, 100 mg, Oral, Daily    Infusions  sodium chloride, 75 mL/hr, Last Rate: Stopped (12/07/23 1425)    Diet  Diet: Diabetic Diets, Gastrointestinal Diets; Consistent Carbohydrate; Fat-Restricted; Texture: Regular Texture (IDDSI 7); Fluid Consistency: Thin (IDDSI 0)    I have personally reviewed:  [x]  Laboratory   []  Microbiology   []  Radiology   [x]  EKG/Telemetry  []  Cardiology/Vascular   []  Pathology    []  Records      Assessment/Plan     Active Hospital Problems    Diagnosis  POA    **Alcohol withdrawal seizure [F10.939, R56.9]  Yes    Coagulopathy [D68.9]  Yes    Severe malnutrition [E43]  Yes    Thrombocytopenia [D69.6]  Yes    Anemia [D64.9]  Yes    Alcoholic hepatitis [K70.10]  Yes    Alcoholic cirrhosis [K70.30]  Yes    Esophageal varices [I85.00]  Yes    Pancytopenia [D61.818]  Yes    Hypomagnesemia [E83.42]  Yes    Hypokalemia [E87.6]  Yes    Type 2 diabetes mellitus with hyperglycemia [E11.65]  Yes    Anxiety [F41.9]  Yes    Depression [F32.A]  Yes      Resolved Hospital Problems   No resolved problems to display.   EtOH Withdrawal/Seizure  - EEG showed no epileptiform discharges, brain MRI shows nothing acute  - continue on EtOH withdrawal protocol, no plans for AEDs at this point per neurology, appreciate recs  - continue PRN versed per [facility] protocol,  vitamin replacement  - continue seizure precautions  - follow electrolytes and replace as needed    Alcoholic Hepatitis/Alcoholic Cirrhosis  - has hx of GI bleeding/esophageal varices and PSE-continue on lactulose and rifaximin  - continue ppi  - prednisolone started for alcoholic hepatitis  - has secondary pancytopenia, will follow  - appreciate GI recs, plans for EGD this admission noted, heme/onc recommending 2 units of platelets to be transfused prior to any procedure    Chronic Pancreatitis  - symptoms controlled  - continue fat-restricted diet  - has pancreatic duct stone, will need specialist follow-up    Thrombocytopenia  - multifactorial from cirrhosis (splenic sequestration) and alcohol consumption with possible ITP contributing   - improving, monitor  - appreciate heme/onc recs    Diabetes Mellitus  - likely type 2 but she is probably insulin deficient to some degree given her chronic pancreatitis, AG is normal  - BG are elevated but she has had a tendency for labile blood sugars-these have been more elevated with steroids  - increase lantus to 10 units Q12H  - increase lispro to 7 units TID with meals  - cover with ssi/hypoglycemia protocol    Abdominal Pain  - likely from above issue  - hold off on tylenol for now given her acute liver issues though contraindication is relative  - tramadol PRN    SCDs for DVT prophylaxis.  Full code.  Discussed with patient, spouse, and nursing staff.  Anticipate discharge home with family when cleared by consultants.      Rocael Driver MD  [facility]  12/10/23  12:50 EST

## 2023-12-10 NOTE — PLAN OF CARE
Goal Outcome Evaluation:  Plan of Care Reviewed With: patient        Progress: improving  Outcome Evaluation: vss. telemetry, sr. confusion, more alert today. cooperative with care.  at bedside. magnesium and potassium replaced. ac/hs.

## 2023-12-10 NOTE — PLAN OF CARE
Problem: Alcohol Withdrawal  Goal: Alcohol Withdrawal Symptom Control  12/10/2023 0059 by Mily Robles, RN  Outcome: Ongoing, Progressing  12/10/2023 0050 by Mily Robles, ADAM  Outcome: Ongoing, Progressing  Intervention: Minimize or Manage Alcohol Withdrawal Symptoms  Recent Flowsheet Documentation  Taken 12/9/2023 2001 by Mily Robles, RN  Sensory Stimulation Regulation: care clustered   Goal Outcome Evaluation:  Plan of Care Reviewed With: patient   Last CIWA score 11     Progress: improving  Outcome Evaluation: Pt. oriented to place, setuation, and self. Calm and cooperative arounf 8 pm. At midnight pt. got agitated, versed and adivan administered. Currently 1252 AM pt. relaxing, watching TV. BS elevated and insulin admministered. Sitter at the bedside to insure safety. Pt. educated to ask for help; when needed, emotional support provided.

## 2023-12-10 NOTE — PROGRESS NOTES
LOS: 4 days   Patient Care Team:  System, Provider Not In as PCP - General  Provider, No Known    Chief Complaint: Severe thrombocytopenia, alcoholic liver cirrhosis, alcohol withdrawal.    Interval History:  12/8/2023  No evidence of bleeding.  Patient is afebrile.  Slightly improved platelets 34,000.  No bowel movement so unable to test stool for occult blood.    12/9/2023  Patient is more coherent today.  She is no longer on wrist restriction.  No spontaneous bleeding.    12/10/2023  Patient looks a lot better, she is a lot awake and coherent.  Not agitated.      A comprehensive 14 point review of systems was performed and was negative except as mentioned.    Vital Signs:  Temp:  [97.8 °F (36.6 °C)-98.6 °F (37 °C)] 98.2 °F (36.8 °C)  Heart Rate:  [] 109  Resp:  [18-20] 20  BP: (118-135)/() 128/93    Intake/Output Summary (Last 24 hours) at 12/10/2023 0926  Last data filed at 12/10/2023 0846  Gross per 24 hour   Intake 1340 ml   Output --   Net 1340 ml         Physical Exam:  GENERAL:  Well-developed, well-nourished female in no acute distress.  Looks much better today.  SKIN:  Warm, dry without rashes, purpura or petechiae.  HEENT:  Normocephalic.   LYMPHATICS:  No cervical, supraclavicular adenopathy.  CHEST: Normal respiratory effort.  Lungs clear to auscultation. Good airflow.  CARDIAC:  Regular rate and rhythm without murmurs. Normal S1,S2.  ABDOMEN:  Soft, nontender with no organomegaly or masses.  Bowel sounds normal.  EXTREMITIES:  No lower extremity edema.  No edema or redness in the right foot.       Results Review:   CBC:      Lab 12/10/23  0633 12/09/23  0652 12/08/23  0630 12/07/23  1703 12/07/23  0801 12/06/23  1722   WBC 5.09 5.25 5.87  --  5.81 3.12*   HEMOGLOBIN 10.0* 9.0* 10.7*  --  10.0* 11.6*   HEMATOCRIT 28.8* 26.8* 30.9*  --  28.8* 33.8*   PLATELETS 69*  69* 54* 34*  34* 25* 25* 28*   NEUTROS ABS 3.21 3.62 4.11  --  4.56 2.37   IMMATURE GRANS (ABS) 0.01 0.02  --   --  0.04  --     LYMPHS ABS 1.17 1.04 1.26  --  0.75 0.45*   MONOS ABS 0.67 0.55 0.43  --  0.42 0.25   EOS ABS 0.02 0.01 0.02  --  0.02 0.00   MCV 95.4 91.5 93.9  --  92.9 89.9     CMP:        Lab 12/10/23  0633 12/09/23  0652 12/08/23  1618 12/08/23  0630 12/07/23  0801 12/06/23  2343 12/06/23  1722   SODIUM 133* 136  --  133* 133*  --  136   POTASSIUM 3.5 3.4* 4.0 3.4* 4.2  4.2  --  2.9*   CHLORIDE 99 101  --  99 100  --  94*   CO2 23.0 22.6  --  22.0 22.7  --  28.0   ANION GAP 11.0 12.4  --  12.0 10.3  --  14.0   BUN 14 17  --  12 7  --  7   CREATININE 0.48* 0.39*  --  0.48* 0.43*  --  0.47*   EGFR 123.7 130.1  --  123.7 127.1  --  124.4   GLUCOSE 433* 243*  --  99 304*  --  65   CALCIUM 9.2 8.5*  --  9.1 8.2*  --  9.1   MAGNESIUM  --  1.7  --   --  2.7* 2.0 1.0*   PHOSPHORUS  --   --   --   --  2.4*  --   --    TOTAL PROTEIN 6.4 6.0  --  6.7 5.9*  --  7.1   ALBUMIN 3.8 3.1*  --  3.9 3.3*  --  4.2   GLOBULIN 2.6 2.9  --  2.8  --   --  2.9   ALT (SGPT) 46* 39*  --  48* 45*  --  54*   AST (SGOT) 114* 106*  --  159* 216*  --  276*   BILIRUBIN 6.2* 6.2*  --  8.0* 7.7*  --  8.4*   INDIRECT BILIRUBIN  --   --   --   --  1.0  --   --    BILIRUBIN DIRECT  --   --   --   --  6.7*  --   --    ALK PHOS 317* 250*  --  293* 248*  --  300*       Results from last 7 days   Lab Units 12/06/23  1722   CK TOTAL U/L 92     Results from last 7 days   Lab Units 12/10/23  0633 12/09/23  2104 12/09/23  0652   INR  1.59* 1.74* 1.83*         Results from last 7 days   Lab Units 12/09/23 0652   MAGNESIUM mg/dL 1.7     Lab Results   Component Value Date    IRON 43 12/08/2023    TIBC 231 (L) 12/08/2023    FERRITIN 579.00 (H) 12/08/2023     Lab Results   Component Value Date    [id number] 893 12/07/2023             I reviewed the patient's new clinical results.        Assessment:    1. Questionable alcohol withdrawal seizure. The patient is being followed by and managed by primary team. She has been put on withdrawal precaution and also started on folic  acid and vitamin B1 injection.   12/9/2023 patient seems improved, no longer in the restriction.  Continue management by primary team.  12/10/2023 patient is a lot better today looks more energetic and mentally clear.     2. Alcoholic liver cirrhosis. History of esophageal varices. The patient has been seen by GI service who ordered CT scan for the abdomen and also stool occult blood for test.   CT scan on 12/8/2023 reported hepatomegaly and fatty infiltration, no mention of liver cirrhosis.  She does have mild splenomegaly 14 cm and also perigastric and the GE junction varices.  No evidence for portal vein thrombosis.     3. Severe thrombocytopenia.   This patient had normal platelets at 320,000 on 12/03/2016 when she had pancreatitis and hospitalized at [facility]. She receives most of care from [facility] and we do not have lab results since that time. She presented with severe thrombocytopenia with platelets 28,000. We do not know exactly when she developed thrombocytopenia. Nevertheless, her thrombocytopenia is most likely related to several reasons including direct toxicity from alcohol consumption. She drinks half gallon of liquor a day and she also has liver cirrhosis which causes thrombocytopenia itself and she also might have splenomegaly due to liver cirrhosis which has been seen by CT scan. I tested her today with mildly elevated IPF at 16.4% with mildly elevated LDH at 238. B12 level came back as normal at 893. I am not sure whether the patient is having ITP which cannot be excluded. She also has been started on prednisolone so will see whether she will respond to the treatment. We will repeat platelets and IPF tomorrow morning for reassessment.   12/8/2023 patient has slightly improved platelets 34,000, with IPF 16.3%.  No evidence for bleeding.  Patient may be responding to oral steroids.  Continue to monitor.  12/8/2023 CT scan reported hepatomegaly with fatty liver, spleen  measures 14 cm, up from 12 cm previously in 2016.    12/9/2023 further improved the platelets at 54,000.    12/10/2023 further improved the platelets 69,000 and IPF 10.8%.  Continue to monitor.  Since patient will have EGD examination on 12/12/2023, we will resume 2 units platelets for that day, in case she needs transfusion prior to the procedure.        4. Mild anemia.   Not surprising the patient is anemic due to her liver cirrhosis and potential GI bleeding. Agree with testing stool for occult blood. I also will check her iron study tomorrow for further assessment. The patient already has been started on oral folic acid 1 mg daily so we will not check a level.   12/8/2023 hemoglobin 10.7, iron study reporting ferritin 579, iron saturation 19% and normal B12 893.  Anemia is due to inflammatory reaction.  12/9/2023 hemoglobin 9.0.  No evidence for bleeding.  Continue to monitor.  12/10/2023, hemoglobin 10.0.  Patient will have EGD examination on 12/12/2023 for evaluation of esophageal varices     5.  Coagulopathy.  This is due to liver cirrhosis and poor nutrition with vitamin K deficiency.  INR 1.99 on 12/8/2023.  Due to increased risk for bleeding, will give patient a dose of vitamin K 5 mg to reverse INR.  12/9/2023 slightly improved INR 1.83.  No bleeding.    On 12/10/2023 further improved INR 1.59.  Continue to monitor.        PLAN:   Continue management for alcohol withdrawal/DT per primary team.  Will prepare 2 units for 12/12/2023, she is scheduled for EGD examination that day.  Continue oral prednisolone 40 mg daily.   The patient will continue oral folic acid and also vitamin B1 injection and management of alcohol withdrawal seizure per primary team.   Evaluation of pain in the right foot, evaluation management per primary team.   Continue to monitor CBC, IPF and INR tomorrow morning.     Discussed with the patient at bedside today. She is much better today.      I communicated with GI service Dr. Brooks  today.     Spoke with nursing staff.        Nuha Baenrjee MD PhD  12/10/23  09:26 EST

## 2023-12-10 NOTE — NURSING NOTE
Pt. , 7 units of Lispro, and 10 units of glargine administered. Rechecked on  the other hand and it is 421. Team aware Will recheck BS.
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
## 2023-12-10 NOTE — DISCHARGE INSTRUCTIONS
SEIZURE INSTRUCTIONS:     Recommended to observe all seizure precautions, including, but not limited to no driving until seizure free for more than 3 months- as per State driving regulation / law;   Avoid all high-risk activity,   Take showers instead of baths,   Avoid swimming without observation,   Avoid open heat sources,   Avoid working at heights and   Avoid engaging in all potentially hazardous activities.   Patient expressed clear understanding

## 2023-12-10 NOTE — PROGRESS NOTES
Access did follow up with pt. Pt's  in room with pt's permission. Pt stated she continues to struggle but  and nurse state pt is showing some improvement in her level of confusion.  states some confusion at times is baseline for pt as she has TBI but she had increased confusion due to alcohol withdrawal. Access will continue to follow.Pt's CIWA went from 16 to 11 to 4 this morning.

## 2023-12-10 NOTE — PROGRESS NOTES
[facility]  Inpatient Progress Note    Reason for Follow Up: Cirrhosis alcohol hepatitis    Subjective     Interval History:   CAT scan reviewed and also reviewed with patient    Current Facility-Administered Medications:     sennosides-docusate (PERICOLACE) 8.6-50 MG per tablet 2 tablet, 2 tablet, Oral, BID PRN **AND** polyethylene glycol (MIRALAX) packet 17 g, 17 g, Oral, Daily PRN **AND** bisacodyl (DULCOLAX) EC tablet 5 mg, 5 mg, Oral, Daily PRN **AND** bisacodyl (DULCOLAX) suppository 10 mg, 10 mg, Rectal, Daily PRN, Amina Buchanan MD    dextrose (D50W) (25 g/50 mL) IV injection 25 g, 25 g, Intravenous, Q15 Min PRN, Amina Buchanan MD    dextrose (GLUTOSE) oral gel 15 g, 15 g, Oral, Q15 Min PRN, Amina Buchanan MD    Diclofenac Sodium 3 % gel 2 g, 2 g, Topical, BID PRN, Rocael Driver MD    folic acid (FOLVITE) tablet 1 mg, 1 mg, Oral, Daily, Amina Buchanan MD, 1 mg at 12/10/23 0855    glucagon (GLUCAGEN) injection 1 mg, 1 mg, Intramuscular, Q15 Min PRN, Amina Buchanan MD    insulin glargine (LANTUS, SEMGLEE) injection 10 Units, 10 Units, Subcutaneous, Q12H, Rocael Driver MD, 10 Units at 12/10/23 1122    insulin lispro (HUMALOG/ADMELOG) injection 2-7 Units, 2-7 Units, Subcutaneous, 4x Daily AC & at Bedtime, Amina Buchanan MD, 2 Units at 12/10/23 1122    insulin lispro (HUMALOG/ADMELOG) injection 7 Units, 7 Units, Subcutaneous, TID With Meals, Rocael Driver MD, 7 Units at 12/10/23 1122    lactulose (CHRONULAC) 10 GM/15ML solution 30 g, 30 g, Oral, BID, Cally Gonsalevs PA-C, 30 g at 12/10/23 0857    levothyroxine (SYNTHROID, LEVOTHROID) tablet 100 mcg, 100 mcg, Oral, Q AM, Amina Buchanan MD, 100 mcg at 12/10/23 0541    LORazepam (ATIVAN) tablet 1 mg, 1 mg, Oral, Q1H PRN, 1 mg at 12/07/23 1200 **OR** midazolam (VERSED) injection 2 mg, 2 mg, Intravenous, Q1H PRN, 2 mg at 12/09/23 2302 **OR** LORazepam (ATIVAN) tablet 2 mg, 2 mg, Oral, Q1H  PRN, 2 mg at 12/10/23 0009 **OR** midazolam (VERSED) injection 4 mg, 4 mg, Intravenous, Q1H PRN, 4 mg at 12/09/23 0329 **OR** midazolam (VERSED) injection 4 mg, 4 mg, Intravenous, Q15 Min PRN, 4 mg at 12/09/23 0835 **OR** midazolam (VERSED) injection 4 mg, 4 mg, Intramuscular, Q15 Min PRN, Amina Buchanan MD    Magnesium Standard Dose Replacement - Follow Nurse / BPA Driven Protocol, , Does not apply, PRN, Mel Gentile MD    magnesium sulfate 2g/50 mL (PREMIX) infusion, 2 g, Intravenous, Q2H, Rocael Driver MD, 2 g at 12/10/23 1122    melatonin tablet 5 mg, 5 mg, Oral, Nightly PRN, Amina Buchanan MD, 5 mg at 12/08/23 1919    ondansetron (ZOFRAN) tablet 4 mg, 4 mg, Oral, Q6H PRN, 4 mg at 12/06/23 2245 **OR** ondansetron (ZOFRAN) injection 4 mg, 4 mg, Intravenous, Q6H PRN, Amina Buchanan MD    pantoprazole (PROTONIX) EC tablet 40 mg, 40 mg, Oral, BID AC, Petra Maier MD, 40 mg at 12/10/23 0855    potassium chloride (K-DUR,KLOR-CON) ER tablet 40 mEq, 40 mEq, Oral, Q4H, Rocael Driver MD, 40 mEq at 12/10/23 0904    Potassium Replacement - Follow Nurse / BPA Driven Protocol, , Does not apply, PRN, Mel Gentile MD    prednisoLONE sodium phosphate (ORAPRED) 15 MG/5ML oral solution 40 mg, 40 mg, Oral, Daily, Amina Buchanan MD, 40 mg at 12/10/23 0856    riFAXIMin (XIFAXAN) tablet 550 mg, 550 mg, Oral, Q12H, Petra Maier MD, 550 mg at 12/10/23 0856    [COMPLETED] Insert Peripheral IV, , , Once **AND** sodium chloride 0.9 % flush 10 mL, 10 mL, Intravenous, PRN, Mel Gentile MD    sodium chloride 0.9 % flush 10 mL, 10 mL, Intravenous, Q12H, Amina Buchanan MD, 10 mL at 12/10/23 0857    sodium chloride 0.9 % flush 10 mL, 10 mL, Intravenous, PRN, Amina Buchanan MD    sodium chloride 0.9 % infusion 40 mL, 40 mL, Intravenous, PRN, Amina Buchanan MD    sodium chloride 0.9 % infusion, 75 mL/hr, Intravenous, Continuous, Amina Buchanan MD, Stopped at  12/07/23 1425    thiamine (B-1) injection 200 mg, 200 mg, Intravenous, Q8H, 200 mg at 12/10/23 0541 **FOLLOWED BY** [START ON 12/12/2023] thiamine (VITAMIN B-1) tablet 100 mg, 100 mg, Oral, Daily, Amina Buchanan MD    traMADol (ULTRAM) tablet 50 mg, 50 mg, Oral, Q6H PRN, Rocael Driver MD, 50 mg at 12/10/23 1122  Review of Systems:    There is weakness and fatigue all other systems reviewed and negative    Objective     Vital Signs  Temp:  [97.8 °F (36.6 °C)-98.8 °F (37.1 °C)] 98.8 °F (37.1 °C)  Heart Rate:  [] 100  Resp:  [18-20] 18  BP: (118-137)/() 137/91  Body mass index is 20.13 kg/m².    Intake/Output Summary (Last 24 hours) at 12/10/2023 1351  Last data filed at 12/10/2023 1236  Gross per 24 hour   Intake 1080 ml   Output --   Net 1080 ml     I/O this shift:  In: 480 [P.O.:480]  Out: -      Physical Exam:   General: patient awake, alert and cooperative   Eyes: Normal lids and lashes, no scleral icterus   Neck: supple, normal ROM   Skin: warm and dry, not jaundiced   Cardiovascular: regular rhythm and rate, no murmurs auscultated   Pulm: clear to auscultation bilaterally, regular and unlabored   Abdomen: soft, nontender, nondistended; normal bowel sounds   Extremities: no rash or edema   Psychiatric: Normal mood and behavior; memory intact     Results Review:     I reviewed the patient's new clinical results.    Results from last 7 days   Lab Units 12/10/23  0633 12/09/23  0652 12/08/23  0630   WBC 10*3/mm3 5.09 5.25 5.87   HEMOGLOBIN g/dL 10.0* 9.0* 10.7*   HEMATOCRIT % 28.8* 26.8* 30.9*   PLATELETS 10*3/mm3 69*  69* 54* 34*  34*     Results from last 7 days   Lab Units 12/10/23  0633 12/09/23  0652 12/08/23  1618 12/08/23  0630   SODIUM mmol/L 133* 136  --  133*   POTASSIUM mmol/L 3.5 3.4* 4.0 3.4*   CHLORIDE mmol/L 99 101  --  99   CO2 mmol/L 23.0 22.6  --  22.0   BUN mg/dL 14 17  --  12   CREATININE mg/dL 0.48* 0.39*  --  0.48*   CALCIUM mg/dL 9.2 8.5*  --  9.1   BILIRUBIN mg/dL 6.2*  6.2*  --  8.0*   ALK PHOS U/L 317* 250*  --  293*   ALT (SGPT) U/L 46* 39*  --  48*   AST (SGOT) U/L 114* 106*  --  159*   GLUCOSE mg/dL 433* 243*  --  99     Results from last 7 days   Lab Units 12/10/23  0633 12/09/23  2104 12/09/23  0652   INR  1.59* 1.74* 1.83*     Lab Results   Lab Value Date/Time    LIPASE 43 12/03/2016 0657    LIPASE 51 12/02/2016 0647    LIPASE 62 (H) 12/01/2016 0753    LIPASE 87 (H) 11/30/2016 1612       Radiology:  MRI Brain With & Without Contrast   Final Result   1. No acute intracranial abnormality.   No abnormal enhancement.           This report was finalized on 12/9/2023 9:00 PM by Dr. Mary Jo Sanchez M.D on Workstation: [id number]          CT Abdomen Pelvis With Contrast   Final Result          Assessment & Plan     Active Hospital Problems    Diagnosis     **Alcohol withdrawal seizure     Coagulopathy     Severe malnutrition     Thrombocytopenia     Anemia     Alcoholic hepatitis     Alcoholic cirrhosis     Esophageal varices     Pancytopenia     Hypomagnesemia     Hypokalemia     Type 2 diabetes mellitus with hyperglycemia     Anxiety     Depression      ssessment:  Decompensated cirrhosis with reported history of ascites, esophageal varices, and HE  [facility]  Elevated ammonia   AFP wnl  PT 22.9, INR 1.99   Alcoholic hepatitis/elevated LFTs - about the same today as yesterday   Hematemesis  Occurred after swallowing blood after biting lip/tongue  Alcohol abuse  Withdrawals         Plan:  Continue steroids for alcoholic hepatitis - continue - monitor LFTs. Check lille score at day 7-which will be Friday  CT reviewed: portal vein patent, GE junction varices noted.  Chronic pancreatitis.  Large stone pancreas head  Xifaxan 550 tid  Continue lactulose, Xifaxan and Protonix  Tolerating po diet well today - nutrition consulted  Hemoglobin stable: No further episodes of hematemesis - Patient would ultimately benefit from EGD this admission, but will need  optimization with regards to platelets first. It would also be best that she is done with withdrawals. If Hgb continues to drop or if she has further episodes of GI bleeding we can consider doing it sooner.   Advised alcohol cessation   She has chronic pancreatitis and a pancreas duct stone she will need to visit with an advanced biliary endoscopist after discharge, either Dr. Hemant Hodges or Cooper Lucas at the [place]  We will follow  I discussed the patients findings and my recommendations with patient and nursing staff.    Yony Brooks MD

## 2023-12-10 NOTE — PLAN OF CARE
Goal Outcome Evaluation:  Plan of Care Reviewed With: patient        Progress: improving  Outcome Evaluation: Pt. oriented to place, setuation, and self. Calm and cooperative arounf 8 pm. At midnight pt. got agitated, versed and adivan administered. Currently 1252 AM pt. relaxing, watching TV. BS elevated and insulin admministered. Sitter at the bedside to insure safety. Pt. educated to ask for help; when needed, emotional support provided.  Last CIWA score 11    Problem: Seizure Disorder Comorbidity  Goal: Maintenance of Seizure Control  Outcome: Ongoing, Progressing   Padding applied on the side rails, safety insured using non skid socks, and x1 assist to the bath room. Call light in reach, side table within reach, fluids, food and snacks provided. Pt. Appeared calm and cooperative at this time. Sitter at the bed side

## 2023-12-11 LAB
ABO GROUP BLD: NORMAL
ALBUMIN SERPL-MCNC: 4 G/DL (ref 3.5–5.2)
ALBUMIN/GLOB SERPL: 1.1 G/DL
ALP SERPL-CCNC: 362 U/L (ref 39–117)
ALT SERPL W P-5'-P-CCNC: 57 U/L (ref 1–33)
ANION GAP SERPL CALCULATED.3IONS-SCNC: 11.5 MMOL/L (ref 5–15)
AST SERPL-CCNC: 119 U/L (ref 1–32)
BASOPHILS # BLD AUTO: 0.01 10*3/MM3 (ref 0–0.2)
BASOPHILS NFR BLD AUTO: 0.2 % (ref 0–1.5)
BILIRUB SERPL-MCNC: 5.9 MG/DL (ref 0–1.2)
BLD GP AB SCN SERPL QL: NEGATIVE
BUN SERPL-MCNC: 10 MG/DL (ref 6–20)
BUN/CREAT SERPL: 18.9 (ref 7–25)
CALCIUM SPEC-SCNC: 9.1 MG/DL (ref 8.6–10.5)
CHLORIDE SERPL-SCNC: 101 MMOL/L (ref 98–107)
CO2 SERPL-SCNC: 21.5 MMOL/L (ref 22–29)
CREAT SERPL-MCNC: 0.53 MG/DL (ref 0.57–1)
DEPRECATED RDW RBC AUTO: 48.1 FL (ref 37–54)
EGFRCR SERPLBLD CKD-EPI 2021: 120.8 ML/MIN/1.73
EOSINOPHIL # BLD AUTO: 0.03 10*3/MM3 (ref 0–0.4)
EOSINOPHIL NFR BLD AUTO: 0.6 % (ref 0.3–6.2)
ERYTHROCYTE [DISTWIDTH] IN BLOOD BY AUTOMATED COUNT: 13.9 % (ref 12.3–15.4)
GLOBULIN UR ELPH-MCNC: 3.6 GM/DL
GLUCOSE BLDC GLUCOMTR-MCNC: 246 MG/DL (ref 70–130)
GLUCOSE BLDC GLUCOMTR-MCNC: 291 MG/DL (ref 70–130)
GLUCOSE BLDC GLUCOMTR-MCNC: 299 MG/DL (ref 70–130)
GLUCOSE BLDC GLUCOMTR-MCNC: 342 MG/DL (ref 70–130)
GLUCOSE SERPL-MCNC: 326 MG/DL (ref 65–99)
HCT VFR BLD AUTO: 33.2 % (ref 34–46.6)
HGB BLD-MCNC: 10.9 G/DL (ref 12–15.9)
IMM GRANULOCYTES # BLD AUTO: 0.03 10*3/MM3 (ref 0–0.05)
IMM GRANULOCYTES NFR BLD AUTO: 0.6 % (ref 0–0.5)
INR PPP: 1.47 (ref 0.9–1.1)
LYMPHOCYTES # BLD AUTO: 1.04 10*3/MM3 (ref 0.7–3.1)
LYMPHOCYTES NFR BLD AUTO: 19.4 % (ref 19.6–45.3)
MAGNESIUM SERPL-MCNC: 2.3 MG/DL (ref 1.6–2.6)
MCH RBC QN AUTO: 31.2 PG (ref 26.6–33)
MCHC RBC AUTO-ENTMCNC: 32.8 G/DL (ref 31.5–35.7)
MCV RBC AUTO: 95.1 FL (ref 79–97)
MONOCYTES # BLD AUTO: 0.45 10*3/MM3 (ref 0.1–0.9)
MONOCYTES NFR BLD AUTO: 8.4 % (ref 5–12)
NEUTROPHILS NFR BLD AUTO: 3.79 10*3/MM3 (ref 1.7–7)
NEUTROPHILS NFR BLD AUTO: 70.8 % (ref 42.7–76)
NRBC BLD AUTO-RTO: 0 /100 WBC (ref 0–0.2)
PLATELET # BLD AUTO: 104 10*3/MM3 (ref 140–450)
PLATELET # BLD AUTO: 104 10*3/MM3 (ref 140–450)
PLATELETS.RETICULATED NFR BLD AUTO: 12.6 % (ref 0.9–6.5)
PMV BLD AUTO: 11.8 FL (ref 6–12)
POTASSIUM SERPL-SCNC: 5.4 MMOL/L (ref 3.5–5.2)
PROT SERPL-MCNC: 7.6 G/DL (ref 6–8.5)
PROTHROMBIN TIME: 18 SECONDS (ref 11.7–14.2)
RBC # BLD AUTO: 3.49 10*6/MM3 (ref 3.77–5.28)
RH BLD: POSITIVE
SODIUM SERPL-SCNC: 134 MMOL/L (ref 136–145)
T&S EXPIRATION DATE: NORMAL
WBC NRBC COR # BLD AUTO: 5.35 10*3/MM3 (ref 3.4–10.8)

## 2023-12-11 PROCEDURE — 63710000001 INSULIN LISPRO (HUMAN) PER 5 UNITS: Performed by: INTERNAL MEDICINE

## 2023-12-11 PROCEDURE — 63710000001 INSULIN LISPRO (HUMAN) PER 5 UNITS: Performed by: NURSE PRACTITIONER

## 2023-12-11 PROCEDURE — 85610 PROTHROMBIN TIME: CPT | Performed by: INTERNAL MEDICINE

## 2023-12-11 PROCEDURE — 86900 BLOOD TYPING SEROLOGIC ABO: CPT | Performed by: NURSE PRACTITIONER

## 2023-12-11 PROCEDURE — 25010000002 THIAMINE HCL 200 MG/2ML SOLUTION: Performed by: STUDENT IN AN ORGANIZED HEALTH CARE EDUCATION/TRAINING PROGRAM

## 2023-12-11 PROCEDURE — 83735 ASSAY OF MAGNESIUM: CPT | Performed by: INTERNAL MEDICINE

## 2023-12-11 PROCEDURE — 63710000001 INSULIN LISPRO (HUMAN) PER 5 UNITS: Performed by: STUDENT IN AN ORGANIZED HEALTH CARE EDUCATION/TRAINING PROGRAM

## 2023-12-11 PROCEDURE — 99232 SBSQ HOSP IP/OBS MODERATE 35: CPT | Performed by: PHYSICIAN ASSISTANT

## 2023-12-11 PROCEDURE — 85055 RETICULATED PLATELET ASSAY: CPT | Performed by: INTERNAL MEDICINE

## 2023-12-11 PROCEDURE — 25010000002 THIAMINE PER 100 MG: Performed by: STUDENT IN AN ORGANIZED HEALTH CARE EDUCATION/TRAINING PROGRAM

## 2023-12-11 PROCEDURE — 80053 COMPREHEN METABOLIC PANEL: CPT | Performed by: HOSPITALIST

## 2023-12-11 PROCEDURE — 86901 BLOOD TYPING SEROLOGIC RH(D): CPT | Performed by: NURSE PRACTITIONER

## 2023-12-11 PROCEDURE — 63710000001 INSULIN GLARGINE PER 5 UNITS: Performed by: INTERNAL MEDICINE

## 2023-12-11 PROCEDURE — 82948 REAGENT STRIP/BLOOD GLUCOSE: CPT

## 2023-12-11 PROCEDURE — 85025 COMPLETE CBC W/AUTO DIFF WBC: CPT | Performed by: HOSPITALIST

## 2023-12-11 PROCEDURE — 86850 RBC ANTIBODY SCREEN: CPT | Performed by: NURSE PRACTITIONER

## 2023-12-11 PROCEDURE — 63710000001 PREDNISOLONE PER 5 MG: Performed by: STUDENT IN AN ORGANIZED HEALTH CARE EDUCATION/TRAINING PROGRAM

## 2023-12-11 PROCEDURE — 97530 THERAPEUTIC ACTIVITIES: CPT

## 2023-12-11 RX ORDER — LORAZEPAM 0.5 MG/1
0.5 TABLET ORAL EVERY 6 HOURS PRN
Status: DISCONTINUED | OUTPATIENT
Start: 2023-12-11 | End: 2023-12-12 | Stop reason: HOSPADM

## 2023-12-11 RX ORDER — LORAZEPAM 1 MG/1
1 TABLET ORAL ONCE
Status: COMPLETED | OUTPATIENT
Start: 2023-12-11 | End: 2023-12-11

## 2023-12-11 RX ORDER — INSULIN LISPRO 100 [IU]/ML
8 INJECTION, SOLUTION INTRAVENOUS; SUBCUTANEOUS
Status: DISCONTINUED | OUTPATIENT
Start: 2023-12-11 | End: 2023-12-12 | Stop reason: HOSPADM

## 2023-12-11 RX ADMIN — RIFAXIMIN 550 MG: 550 TABLET ORAL at 08:06

## 2023-12-11 RX ADMIN — LEVOTHYROXINE SODIUM 100 MCG: 100 TABLET ORAL at 05:17

## 2023-12-11 RX ADMIN — RIFAXIMIN 550 MG: 550 TABLET ORAL at 20:39

## 2023-12-11 RX ADMIN — INSULIN LISPRO 5 UNITS: 100 INJECTION, SOLUTION INTRAVENOUS; SUBCUTANEOUS at 08:06

## 2023-12-11 RX ADMIN — PREDNISOLONE SODIUM PHOSPHATE 40 MG: 15 SOLUTION ORAL at 09:51

## 2023-12-11 RX ADMIN — LACTULOSE 30 G: 10 SOLUTION ORAL at 20:39

## 2023-12-11 RX ADMIN — INSULIN LISPRO 3 UNITS: 100 INJECTION, SOLUTION INTRAVENOUS; SUBCUTANEOUS at 11:35

## 2023-12-11 RX ADMIN — DICLOFENAC SODIUM 2 G: 30 GEL TOPICAL at 11:35

## 2023-12-11 RX ADMIN — INSULIN LISPRO 4 UNITS: 100 INJECTION, SOLUTION INTRAVENOUS; SUBCUTANEOUS at 21:23

## 2023-12-11 RX ADMIN — INSULIN LISPRO 8 UNITS: 100 INJECTION, SOLUTION INTRAVENOUS; SUBCUTANEOUS at 17:10

## 2023-12-11 RX ADMIN — TRAMADOL HYDROCHLORIDE 50 MG: 50 TABLET ORAL at 11:39

## 2023-12-11 RX ADMIN — INSULIN GLARGINE 10 UNITS: 100 INJECTION, SOLUTION SUBCUTANEOUS at 21:24

## 2023-12-11 RX ADMIN — THIAMINE HYDROCHLORIDE 200 MG: 100 INJECTION, SOLUTION INTRAMUSCULAR; INTRAVENOUS at 05:17

## 2023-12-11 RX ADMIN — PANTOPRAZOLE SODIUM 40 MG: 40 TABLET, DELAYED RELEASE ORAL at 17:11

## 2023-12-11 RX ADMIN — LACTULOSE 30 G: 10 SOLUTION ORAL at 08:07

## 2023-12-11 RX ADMIN — INSULIN LISPRO 7 UNITS: 100 INJECTION, SOLUTION INTRAVENOUS; SUBCUTANEOUS at 11:35

## 2023-12-11 RX ADMIN — FOLIC ACID 1 MG: 1 TABLET ORAL at 08:06

## 2023-12-11 RX ADMIN — Medication 10 ML: at 20:39

## 2023-12-11 RX ADMIN — PANTOPRAZOLE SODIUM 40 MG: 40 TABLET, DELAYED RELEASE ORAL at 08:06

## 2023-12-11 RX ADMIN — INSULIN LISPRO 4 UNITS: 100 INJECTION, SOLUTION INTRAVENOUS; SUBCUTANEOUS at 17:11

## 2023-12-11 RX ADMIN — LORAZEPAM 1 MG: 1 TABLET ORAL at 01:25

## 2023-12-11 RX ADMIN — TRAMADOL HYDROCHLORIDE 50 MG: 50 TABLET ORAL at 17:11

## 2023-12-11 RX ADMIN — Medication 10 ML: at 08:06

## 2023-12-11 RX ADMIN — THIAMINE HYDROCHLORIDE 200 MG: 100 INJECTION, SOLUTION INTRAMUSCULAR; INTRAVENOUS at 22:04

## 2023-12-11 RX ADMIN — LORAZEPAM 0.5 MG: 0.5 TABLET ORAL at 11:41

## 2023-12-11 RX ADMIN — INSULIN GLARGINE 10 UNITS: 100 INJECTION, SOLUTION SUBCUTANEOUS at 08:06

## 2023-12-11 RX ADMIN — INSULIN LISPRO 7 UNITS: 100 INJECTION, SOLUTION INTRAVENOUS; SUBCUTANEOUS at 08:06
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
## 2023-12-11 NOTE — PROGRESS NOTES
"    Name: Corinna Almonte ADMIT: 2023   : 1985  PCP: System, Provider Not In    MRN: [id number] LOS: 5 days   AGE/SEX: 38 y.o. female  ROOM: [facility]/     Subjective   Subjective   Lying in bed.  No family currently at bedside and she states her  is on his way.  Sitter at bedside.  Notified by nursing that patient states she is leaving at 1130 today.  She currently tells me the year is  and that she is at the hospital.  When asked what she is here for, she states \"multiple reasons.\"  When asked to tell me one of the reasons, she states \"psychological.\"  She denies any chest pain, dyspnea, cough, fever or chills.  Denies any nausea or vomiting.  States she has had 2 bowel movements today and is tolerating a diet.  She tells me that she will come back outpatient tomorrow for an EGD, but refuses to stay in the hospital any longer.      Objective   Objective   Vital Signs  Temp:  [98.1 °F (36.7 °C)-98.8 °F (37.1 °C)] 98.6 °F (37 °C)  Heart Rate:  [] 88  Resp:  [18] 18  BP: (117-138)/(85-98) 138/87  SpO2:  [99 %-100 %] 99 %  on   ;   Device (Oxygen Therapy): room air  Body mass index is 20.13 kg/m².    Physical Exam  Vitals and nursing note reviewed.   Constitutional:       Appearance: She is ill-appearing. She is not toxic-appearing.   Eyes:      General: Scleral icterus present.   Cardiovascular:      Rate and Rhythm: Normal rate and regular rhythm.      Pulses: Normal pulses.   Pulmonary:      Effort: Pulmonary effort is normal. No respiratory distress.      Breath sounds: Normal breath sounds.   Abdominal:      General: Bowel sounds are normal. There is no distension.      Palpations: Abdomen is soft.      Tenderness: There is no abdominal tenderness.   Skin:     General: Skin is warm and dry.      Findings: Bruising present.   Neurological:      General: No focal deficit present.      Mental Status: She is alert and oriented to person, place, and time.      Sensory: No sensory deficit.     "  Motor: Weakness present.      Coordination: Coordination normal.      Comments: Still mildly confused   Psychiatric:         Mood and Affect: Mood normal.         Behavior: Behavior normal.       Results Review:       I reviewed the patient's new clinical results.  Results from last 7 days   Lab Units 12/11/23  0517 12/10/23  0633 12/09/23  0652 12/08/23  0630   WBC 10*3/mm3 5.35 5.09 5.25 5.87   HEMOGLOBIN g/dL 10.9* 10.0* 9.0* 10.7*   PLATELETS 10*3/mm3 104*  104* 69*  69* 54* 34*  34*     Results from last 7 days   Lab Units 12/11/23  0517 12/10/23  1759 12/10/23  0633 12/09/23  0652 12/08/23  1618 12/08/23 0630   SODIUM mmol/L 134*  --  133* 136  --  133*   POTASSIUM mmol/L 5.4* 4.8 3.5 3.4*   < > 3.4*   CHLORIDE mmol/L 101  --  99 101  --  99   CO2 mmol/L 21.5*  --  23.0 22.6  --  22.0   BUN mg/dL 10  --  14 17  --  12   CREATININE mg/dL 0.53*  --  0.48* 0.39*  --  0.48*   GLUCOSE mg/dL 326*  --  433* 243*  --  99    < > = values in this interval not displayed.   Estimated Creatinine Clearance: 119.7 mL/min (A) (by C-G formula based on SCr of 0.53 mg/dL (L)).  Results from last 7 days   Lab Units 12/11/23  0517 12/10/23  0633 12/09/23  0652 12/08/23  0630   ALBUMIN g/dL 4.0 3.8 3.1* 3.9   BILIRUBIN mg/dL 5.9* 6.2* 6.2* 8.0*   ALK PHOS U/L 362* 317* 250* 293*   AST (SGOT) U/L 119* 114* 106* 159*   ALT (SGPT) U/L 57* 46* 39* 48*     Results from last 7 days   Lab Units 12/11/23  0517 12/10/23  0633 12/09/23  0652 12/08/23 0630 12/07/23  0801   CALCIUM mg/dL 9.1 9.2 8.5* 9.1 8.2*   ALBUMIN g/dL 4.0 3.8 3.1* 3.9 3.3*   MAGNESIUM mg/dL 2.3 1.5* 1.7  --  2.7*   PHOSPHORUS mg/dL  --   --   --   --  2.4*       Glucose   Date/Time Value Ref Range Status   12/11/2023 0735 342 (H) 70 - 130 mg/dL Final   12/10/2023 2025 270 (H) 70 - 130 mg/dL Final   12/10/2023 1629 291 (H) 70 - 130 mg/dL Final   12/10/2023 1111 178 (H) 70 - 130 mg/dL Final   12/10/2023 0707 404 (C) 70 - 130 mg/dL Final   12/10/2023 0706 423 (C) 70 -  130 mg/dL Final   12/10/2023 0005 324 (H) 70 - 130 mg/dL Final       [START ON 12/12/2023] acetaminophen, 650 mg, Oral, Once   Or  [START ON 12/12/2023] acetaminophen, 650 mg, Oral, Once   Or  [START ON 12/12/2023] acetaminophen, 650 mg, Rectal, Once  [START ON 12/12/2023] diphenhydrAMINE, 25 mg, Oral, Once   Or  [START ON 12/12/2023] diphenhydrAMINE, 25 mg, Intravenous, Once  folic acid, 1 mg, Oral, Daily  insulin glargine, 10 Units, Subcutaneous, Q12H  insulin lispro, 2-7 Units, Subcutaneous, 4x Daily AC & at Bedtime  insulin lispro, 7 Units, Subcutaneous, TID With Meals  lactulose, 30 g, Oral, BID  levothyroxine, 100 mcg, Oral, Q AM  pantoprazole, 40 mg, Oral, BID AC  prednisoLONE sodium phosphate, 40 mg, Oral, Daily  rifAXIMin, 550 mg, Oral, Q12H  sodium chloride, 10 mL, Intravenous, Q12H  thiamine (B-1) IV, 200 mg, Intravenous, Q8H   Followed by  [START ON 12/12/2023] thiamine, 100 mg, Oral, Daily      sodium chloride, 75 mL/hr, Last Rate: Stopped (12/07/23 1425)    Diet: Diabetic Diets, Gastrointestinal Diets; Consistent Carbohydrate; Fat-Restricted; Texture: Regular Texture (IDDSI 7); Fluid Consistency: Thin (IDDSI 0)  NPO Diet NPO Type: Strict NPO       Assessment/Plan     Active Hospital Problems    Diagnosis  POA    **Alcohol withdrawal seizure [F10.939, R56.9]  Yes    Coagulopathy [D68.9]  Yes    Severe malnutrition [E43]  Yes    Thrombocytopenia [D69.6]  Yes    Anemia [D64.9]  Yes    Alcoholic hepatitis [K70.10]  Yes    Alcoholic cirrhosis [K70.30]  Yes    Esophageal varices [I85.00]  Yes    Pancytopenia [D61.818]  Yes    Hypomagnesemia [E83.42]  Yes    Hypokalemia [E87.6]  Yes    Type 2 diabetes mellitus with hyperglycemia [E11.65]  Yes    Anxiety [F41.9]  Yes    Depression [F32.A]  Yes      Resolved Hospital Problems   No resolved problems to display.       Ms. Almonte is a 38 y.o. female who presented to the hospital with witnessed seizure-like activity by her .  She apparently receives most  of her medical care at the [facility].  She was apparently lying on her side when she put her arms above her head and started screaming unintelligibly.  She clenched her jaw shut and bit her tongue and was felt to be postictal afterwards.  She drinks apparently a half a gallon of liquor a day at baseline but did quit drinking 2 days before this episode    EtOH Withdrawal/Seizure  -EEG showed no epileptiform discharges, brain MRI shows nothing acute  -Continue on EtOH withdrawal protocol- last CIWA 2  -No plans for AEDs at this point per neurology, appreciate recs  -Continue seizure precautions  -Follow electrolytes and replace as needed     Alcoholic Hepatitis/Alcoholic Cirrhosis  -Has hx of GI bleeding/esophageal varices and PSE-continue on lactulose and rifaximin  -Continue ppi  -Prednisolone started for alcoholic hepatitis  -Has secondary pancytopenia, will follow  -Appreciate GI recs, plans for EGD tomorrow, heme/onc recommending 2 units of platelets to be transfused prior to any procedure     Chronic Pancreatitis  -Symptoms controlled  -Continue fat-restricted diet  -Has pancreatic duct stone, will need specialist follow-up     Thrombocytopenia  -Multifactorial from cirrhosis (splenic sequestration) and alcohol consumption with possible ITP contributing   -Improving at present, continue to monitor trends.  -Appreciate heme/onc recs     Diabetes Mellitus  -Likely type 2 but she is probably insulin deficient to some degree given her chronic pancreatitis, AG is normal  -BG are elevated but she has had a tendency for labile blood sugars-these have been more elevated with steroids  -Continue lantus to 10 units Q12H  -Increase lispro to 8 units TID with meals- seems to be eating about 100% meals  -Cover with ssi/hypoglycemia protocol     Abdominal Pain  -Likely from above issue  -Hold off on tylenol for now given her acute liver issues though contraindication is relative  -Tramadol PRN    Hyperkalemia  -Mildly  elevated at 5.4 today- repeat in AM. Suspect related to potassium replacement.  -Mag 2.3 today.    She did report some desire to leave the hospital today.  Told her nurse that she was going to leave and 1130 when her  arrived.  When I explained that she has not been medically cleared, she states I understand it would be AMA.  She states she is willing to come back for outpatient EGD tomorrow, but has no intention of staying in the hospital.  Explained to her the risk of worsening health status and/or death.    Nursing is going to call  to see if he can talk to her further.  Suspect anxiety likely contributing and will add order for as needed Ativan for anxiety alone patient not qualifying per CIWA.  Nursing to update us later.    Ultimately, if labs stable and cleared by GI and hematology tomorrow, she could potentially be discharged after the procedure or on Wednesday.     SCDs for DVT prophylaxis.  Full code.  Discussed with patient, nurse and Dr. Driver.  Anticipate discharge home with family when cleared by consultants.    VIRGEN Hurtado  [facility]  12/11/23  10:04 EST

## 2023-12-11 NOTE — PLAN OF CARE
Goal Outcome Evaluation:  Plan of Care Reviewed With: patient           Outcome Evaluation: Pt. is currently independent with functional mobility and has no further questions/concerns regarding functional mobility or home safety.  Encouraged pt. to continue ambulation with nursing staff while here in the hospital.  Otherwise, P.T. will sign off.

## 2023-12-11 NOTE — PROGRESS NOTES
LOS: 5 days   Patient Care Team:  System, Provider Not In as PCP - General  Provider, No Known    Chief Complaint: Severe thrombocytopenia, alcoholic liver cirrhosis, alcohol withdrawal.    Interval History:  Patient reports no new complaints today.  She is not agitated.  She had questions regarding her follow-up care and prefers to continue to follow-up with hematology at [facility].  Denies any aches or bleeding or bruising.  Continues on prednisolone 40 mg daily    A comprehensive 14 point review of systems was performed and was negative except as mentioned.    Vital Signs:  Temp:  [98.1 °F (36.7 °C)-99 °F (37.2 °C)] 99 °F (37.2 °C)  Heart Rate:  [82-94] 88  Resp:  [18] 18  BP: (117-138)/(85-98) 122/93    Intake/Output Summary (Last 24 hours) at 12/11/2023 1609  Last data filed at 12/10/2023 1751  Gross per 24 hour   Intake 200 ml   Output --   Net 200 ml         Physical Exam:  GENERAL:  Well-developed, well-nourished female in no acute distress.  Looks much better today.  SKIN:  Warm, dry without rashes, purpura or petechiae.  HEENT:  Normocephalic.   LYMPHATICS:  No cervical, supraclavicular adenopathy.  CHEST: Normal respiratory effort.  Lungs clear to auscultation. Good airflow.  CARDIAC:  Regular rate and rhythm without murmurs. Normal S1,S2.  ABDOMEN:  Soft, nontender with no organomegaly or masses.  Bowel sounds normal.  EXTREMITIES:  No lower extremity edema.  No edema or redness in the right foot.    I have reexamined the patient and the results are consistent with the previously documented exam. Sravanthi Bethea MD         Results Review:   CBC:      Lab 12/11/23  0517 12/10/23  0633 12/09/23  0652 12/08/23  0630 12/07/23  1703 12/07/23  0801   WBC 5.35 5.09 5.25 5.87  --  5.81   HEMOGLOBIN 10.9* 10.0* 9.0* 10.7*  --  10.0*   HEMATOCRIT 33.2* 28.8* 26.8* 30.9*  --  28.8*   PLATELETS 104*  104* 69*  69* 54* 34*  34* 25* 25*   NEUTROS ABS 3.79 3.21 3.62 4.11  --  4.56   IMMATURE GRANS (ABS) 0.03 0.01  0.02  --   --  0.04   LYMPHS ABS 1.04 1.17 1.04 1.26  --  0.75   MONOS ABS 0.45 0.67 0.55 0.43  --  0.42   EOS ABS 0.03 0.02 0.01 0.02  --  0.02   MCV 95.1 95.4 91.5 93.9  --  92.9     CMP:        Lab 12/11/23  0517 12/10/23  1759 12/10/23  0633 12/09/23  0652 12/08/23  1618 12/08/23  0630 12/07/23  0801 12/06/23  2343 12/06/23  1722   SODIUM 134*  --  133* 136  --  133* 133*  --  136   POTASSIUM 5.4* 4.8 3.5 3.4* 4.0 3.4* 4.2  4.2  --  2.9*   CHLORIDE 101  --  99 101  --  99 100  --  94*   CO2 21.5*  --  23.0 22.6  --  22.0 22.7  --  28.0   ANION GAP 11.5  --  11.0 12.4  --  12.0 10.3  --  14.0   BUN 10  --  14 17  --  12 7  --  7   CREATININE 0.53*  --  0.48* 0.39*  --  0.48* 0.43*  --  0.47*   EGFR 120.8  --  123.7 130.1  --  123.7 127.1  --  124.4   GLUCOSE 326*  --  433* 243*  --  99 304*  --  65   CALCIUM 9.1  --  9.2 8.5*  --  9.1 8.2*  --  9.1   MAGNESIUM 2.3  --  1.5* 1.7  --   --  2.7* 2.0 1.0*   PHOSPHORUS  --   --   --   --   --   --  2.4*  --   --    TOTAL PROTEIN 7.6  --  6.4 6.0  --  6.7 5.9*  --  7.1   ALBUMIN 4.0  --  3.8 3.1*  --  3.9 3.3*  --  4.2   GLOBULIN 3.6  --  2.6 2.9  --  2.8  --   --  2.9   ALT (SGPT) 57*  --  46* 39*  --  48* 45*  --  54*   AST (SGOT) 119*  --  114* 106*  --  159* 216*  --  276*   BILIRUBIN 5.9*  --  6.2* 6.2*  --  8.0* 7.7*  --  8.4*   INDIRECT BILIRUBIN  --   --   --   --   --   --  1.0  --   --    BILIRUBIN DIRECT  --   --   --   --   --   --  6.7*  --   --    ALK PHOS 362*  --  317* 250*  --  293* 248*  --  300*       Results from last 7 days   Lab Units 12/06/23  1722   CK TOTAL U/L 92     Results from last 7 days   Lab Units 12/11/23  0517 12/10/23  0633 12/09/23  2104   INR  1.47* 1.59* 1.74*         Results from last 7 days   Lab Units 12/11/23  0517   MAGNESIUM mg/dL 2.3     Lab Results   Component Value Date    IRON 43 12/08/2023    TIBC 231 (L) 12/08/2023    FERRITIN 579.00 (H) 12/08/2023     Lab Results   Component Value Date    [id number] 893 12/07/2023              I reviewed the patient's new clinical results.        Assessment:    1. Questionable alcohol withdrawal seizure. The patient is being followed by and managed by primary team. She has been put on withdrawal precaution and also started on folic acid and vitamin B1 injection.   12/9/2023 patient seems improved, no longer in the restriction.  Continue management by primary team.  12/11/2023-mental status much improved and pretty much at baseline.     2. Alcoholic liver cirrhosis. History of esophageal varices. The patient has been seen by GI service who ordered CT scan for the abdomen and also stool occult blood for test.   CT scan on 12/8/2023 reported hepatomegaly and fatty infiltration, no mention of liver cirrhosis.  She does have mild splenomegaly 14 cm and also perigastric and the GE junction varices.  No evidence for portal vein thrombosis.     3. Severe thrombocytopenia.   This patient had normal platelets at 320,000 on 12/03/2016 when she had pancreatitis and hospitalized at [facility]. She receives most of care from [facility] and we do not have lab results since that time. She presented with severe thrombocytopenia with platelets 28,000. We do not know exactly when she developed thrombocytopenia. Nevertheless, her thrombocytopenia is most likely related to several reasons including direct toxicity from alcohol consumption. She drinks half gallon of liquor a day and she also has liver cirrhosis which causes thrombocytopenia itself and she also might have splenomegaly due to liver cirrhosis which has been seen by CT scan. I tested her today with mildly elevated IPF at 16.4% with mildly elevated LDH at 238. B12 level came back as normal at 893. I am not sure whether the patient is having ITP which cannot be excluded. She also has been started on prednisolone so will see whether she will respond to the treatment. We will repeat platelets and IPF tomorrow morning for reassessment.    12/8/2023 patient has slightly improved platelets 34,000, with IPF 16.3%.  No evidence for bleeding.  Patient may be responding to oral steroids.  Continue to monitor.  12/8/2023 CT scan reported hepatomegaly with fatty liver, spleen measures 14 cm, up from 12 cm previously in 2016.    12/9/2023 further improved the platelets at 54,000.    12/10/2023 further improved the platelets 69,000 and IPF 10.8%.  Continue to monitor.    12/11/2023-platelets have improved to 104,000.  She does not require any platelet transfusion prior to the EGD tomorrow since platelets greater than 100,000.  IPF 12.6.  Continue daily CBC and IPF       4. Mild anemia.   Not surprising the patient is anemic due to her liver cirrhosis and potential GI bleeding. Agree with testing stool for occult blood. I also will check her iron study tomorrow for further assessment. The patient already has been started on oral folic acid 1 mg daily so we will not check a level.   12/8/2023 hemoglobin 10.7, iron study reporting ferritin 579, iron saturation 19% and normal B12 893.  Anemia is due to inflammatory reaction.  12/9/2023 hemoglobin 9.0.  No evidence for bleeding.  Continue to monitor.  12/10/2023, hemoglobin 10.0.  Patient will have EGD examination on 12/12/2023 for evaluation of esophageal varices  12/11/2023-hemoglobin has improved to 10.9     5.  Coagulopathy.  This is due to liver cirrhosis and poor nutrition with vitamin K deficiency.  INR 1.99 on 12/8/2023.  Due to increased risk for bleeding, will give patient a dose of vitamin K 5 mg to reverse INR.  12/9/2023 slightly improved INR 1.83.  No bleeding.    12/11/2023-INR 1.47        PLAN:   Continue management for alcohol withdrawal/DT per primary team.  Since platelet count is greater than 100,000, she does not require any platelet transfusion prior to the procedure tomorrow  Continue oral prednisolone 40 mg daily.  Steroids have been initiated by gastroenterology for alcoholic hepatitis.   Steroid taper per gastroenterology  The patient will continue oral folic acid and also vitamin B12 injection and management of alcohol withdrawal seizure per primary team.   Given significant improvement in platelet count we will sign off at this time and patient can follow-up with our clinic as an outpatient.    Discussed with the patient at bedside today. She is much better today.      Patient is new to me and all issues are new to me today.       Spoke with nursing staff.        Sravanthi Bethea MD  12/11/23  16:09 EST

## 2023-12-11 NOTE — PLAN OF CARE
Problem: Skin Injury Risk Increased  Goal: Skin Health and Integrity  Outcome: Ongoing, Progressing  Intervention: Optimize Skin Protection  Recent Flowsheet Documentation  Taken 12/11/2023 0400 by Allison Jackson RN  Head of Bed (HOB) Positioning: HOB at 20 degrees  Taken 12/11/2023 0200 by Allison Jackson RN  Head of Bed (HOB) Positioning: HOB at 20 degrees  Taken 12/11/2023 0000 by Allison Jackson RN  Head of Bed (HOB) Positioning: HOB at 20 degrees  Taken 12/10/2023 2151 by Allison Jackson RN  Head of Bed (HOB) Positioning: HOB at 20 degrees  Taken 12/10/2023 2000 by Allison Jackson RN  Pressure Reduction Techniques: frequent weight shift encouraged  Head of Bed (HOB) Positioning: HOB at 20 degrees  Pressure Reduction Devices: positioning supports utilized  Skin Protection: adhesive use limited     Problem: Diabetes Comorbidity  Goal: Blood Glucose Level Within Targeted Range  Outcome: Ongoing, Progressing     Problem: Hypertension Comorbidity  Goal: Blood Pressure in Desired Range  Outcome: Ongoing, Progressing  Intervention: Maintain Blood Pressure Management  Recent Flowsheet Documentation  Taken 12/11/2023 0400 by Allison Jackson RN  Medication Review/Management: medications reviewed  Taken 12/11/2023 0200 by Allison Jackson RN  Medication Review/Management: medications reviewed  Taken 12/11/2023 0000 by Allison Jackson RN  Medication Review/Management: medications reviewed  Taken 12/10/2023 2151 by Allison Jackson RN  Medication Review/Management: medications reviewed  Taken 12/10/2023 2000 by Allison Jackson RN  Medication Review/Management: medications reviewed     Problem: Seizure Disorder Comorbidity  Goal: Maintenance of Seizure Control  Outcome: Ongoing, Progressing     Problem: Fall Injury Risk  Goal: Absence of Fall and Fall-Related Injury  Outcome: Ongoing, Progressing  Intervention: Identify and Manage Contributors  Recent Flowsheet Documentation  Taken 12/11/2023 0400 by Allison Jackson  RN  Medication Review/Management: medications reviewed  Taken 12/11/2023 0200 by Allison Jackson RN  Medication Review/Management: medications reviewed  Taken 12/11/2023 0000 by Allison Jackson RN  Medication Review/Management: medications reviewed  Taken 12/10/2023 2151 by Allison Jackson RN  Medication Review/Management: medications reviewed  Taken 12/10/2023 2000 by Allison Jackson RN  Medication Review/Management: medications reviewed  Intervention: Promote Injury-Free Environment  Recent Flowsheet Documentation  Taken 12/11/2023 0400 by Allison Jackson RN  Safety Promotion/Fall Prevention: safety round/check completed  Taken 12/11/2023 0200 by Allison Jackson RN  Safety Promotion/Fall Prevention: safety round/check completed  Taken 12/11/2023 0000 by Allison Jackson RN  Safety Promotion/Fall Prevention: safety round/check completed  Taken 12/10/2023 2151 by Allison Jackson RN  Safety Promotion/Fall Prevention: safety round/check completed  Taken 12/10/2023 2000 by Allison Jackson RN  Safety Promotion/Fall Prevention: safety round/check completed     Problem: Adult Inpatient Plan of Care  Goal: Plan of Care Review  Outcome: Ongoing, Progressing  Flowsheets (Taken 12/11/2023 0407)  Plan of Care Reviewed With: patient  Goal: Patient-Specific Goal (Individualized)  Outcome: Ongoing, Progressing  Goal: Absence of Hospital-Acquired Illness or Injury  Outcome: Ongoing, Progressing  Intervention: Identify and Manage Fall Risk  Recent Flowsheet Documentation  Taken 12/11/2023 0400 by Allison Jackson RN  Safety Promotion/Fall Prevention: safety round/check completed  Taken 12/11/2023 0200 by Allison Jackson RN  Safety Promotion/Fall Prevention: safety round/check completed  Taken 12/11/2023 0000 by Allison Jackson RN  Safety Promotion/Fall Prevention: safety round/check completed  Taken 12/10/2023 2151 by Allison Jackson RN  Safety Promotion/Fall Prevention: safety round/check completed  Taken 12/10/2023 2000 by Manuel  done ADAM Cooper  Safety Promotion/Fall Prevention: safety round/check completed  Intervention: Prevent Skin Injury  Recent Flowsheet Documentation  Taken 12/11/2023 0400 by Allison Jackson RN  Body Position: position changed independently  Taken 12/11/2023 0200 by Allison Jackson RN  Body Position: position changed independently  Taken 12/11/2023 0000 by Allison Jackson RN  Body Position: position changed independently  Taken 12/10/2023 2151 by Allison Jackson RN  Body Position: position changed independently  Taken 12/10/2023 2000 by lAlison Jackson RN  Body Position: position changed independently  Skin Protection: adhesive use limited  Intervention: Prevent and Manage VTE (Venous Thromboembolism) Risk  Recent Flowsheet Documentation  Taken 12/10/2023 2000 by Allison Jackson RN  VTE Prevention/Management:   bilateral   sequential compression devices on  Range of Motion: active ROM (range of motion) encouraged  Intervention: Prevent Infection  Recent Flowsheet Documentation  Taken 12/10/2023 2000 by Allison Jackson RN  Infection Prevention: single patient room provided  Goal: Optimal Comfort and Wellbeing  Outcome: Ongoing, Progressing  Intervention: Provide Person-Centered Care  Recent Flowsheet Documentation  Taken 12/10/2023 2000 by Allison Jackson RN  Trust Relationship/Rapport:   care explained   choices provided  Goal: Readiness for Transition of Care  Outcome: Ongoing, Progressing     Problem: Alcohol Withdrawal  Goal: Alcohol Withdrawal Symptom Control  Outcome: Ongoing, Progressing  Intervention: Minimize or Manage Alcohol Withdrawal Symptoms  Recent Flowsheet Documentation  Taken 12/10/2023 2000 by Allison Jackson RN  Sensory Stimulation Regulation: care clustered     Problem: Acute Neurologic Deterioration (Alcohol Withdrawal)  Goal: Optimal Neurologic Function  Outcome: Ongoing, Progressing  Intervention: Minimize or Manage Acute Neurologic Symptoms  Recent Flowsheet Documentation  Taken 12/10/2023 2000 by  Allison Jackson, RN  Sensory Stimulation Regulation: care clustered  Cerebral Perfusion Promotion: blood pressure monitored     Problem: Substance Misuse (Alcohol Withdrawal)  Goal: Readiness for Change Identified  Outcome: Ongoing, Progressing  Intervention: Partner to Facilitate Behavior Change  Recent Flowsheet Documentation  Taken 12/10/2023 2000 by Allison Jackson RN  Supportive Measures: active listening utilized  Intervention: Promote Psychosocial Wellbeing  Recent Flowsheet Documentation  Taken 12/10/2023 2000 by Allison Jackson, RN  Family/Support System Care: self-care encouraged   Goal Outcome Evaluation:  Plan of Care Reviewed With: patient

## 2023-12-11 NOTE — PLAN OF CARE
Goal Outcome Evaluation:  Plan of Care Reviewed With: patient        Progress: no change  Outcome Evaluation: vss. telemetry monitor, sr. alert and oriented x4, forgetful. sitter at bedside. npo after midnight, egd planned for tomorow.

## 2023-12-11 NOTE — THERAPY DISCHARGE NOTE
Patient Name: Corinna Almonte  : 1985    MRN: [id number]                              Today's Date: 2023       Admit Date: 2023    Visit Dx:     ICD-10-CM ICD-9-CM   1. Seizure  R56.9 780.39   2. Alcoholic cirrhosis, unspecified whether ascites present  K70.30 571.2   3. Hypomagnesemia  E83.42 275.2   4. Hypokalemia  E87.6 276.8   5. Alcohol abuse  F10.10 305.00   6. Alcohol withdrawal syndrome without complication  F10.930 291.81   7. Thrombocytopenia  D69.6 287.5     Patient Active Problem List   Diagnosis    Polysubstance overdose    Alcohol withdrawal seizure    Alcoholic hepatitis    Alcoholic cirrhosis    Esophageal varices    Pancytopenia    Hypomagnesemia    Hypokalemia    Type 2 diabetes mellitus with hyperglycemia    Anxiety    Depression    Severe malnutrition    Thrombocytopenia    Anemia    Coagulopathy     Past Medical History:   Diagnosis Date    Alcohol abuse with alcohol-induced anxiety disorder     Anxiety     Depression     Disease of thyroid gland     Status post alcohol detoxification      Past Surgical History:   Procedure Laterality Date     SECTION      x2    TONSILLECTOMY        General Information       Row Name 23 1145          Physical Therapy Time and Intention    Document Type discharge treatment  -MS     Mode of Treatment physical therapy;individual therapy  -MS       Row Name 23 1145          General Information    Patient Profile Reviewed yes  -MS     Existing Precautions/Restrictions --  Sitter present in room  -MS     Barriers to Rehab none identified  -MS       Row Name 23 1145          Cognition    Orientation Status (Cognition) oriented to;person  -MS               User Key  (r) = Recorded By, (t) = Taken By, (c) = Cosigned By      Initials Name Provider Type    Rocael Aragon, PT Physical Therapist                   Mobility       Row Name 23 1145          Bed Mobility    Supine-Sit [place] (Bed Mobility) independent   -MS     Sit-Supine [place] (Bed Mobility) independent  -MS       Row Name 12/11/23 1145          Sit-Stand Transfer    Sit-Stand [place] (Transfers) independent  -MS       Row Name 12/11/23 1145          Gait/Stairs (Locomotion)    [place] Level (Gait) independent  -MS     Distance in Feet (Gait) 200 feet  -MS     Comment, (Gait/Stairs) No balance deficits noted.  -MS               User Key  (r) = Recorded By, (t) = Taken By, (c) = Cosigned By      Initials Name Provider Type    Rocael Aragon, PT Physical Therapist                   Obj/Interventions    No documentation.                  Goals/Plan    No documentation.                  Clinical Impression       Row Name 12/11/23 1146          Pain    Pretreatment Pain Rating 7/10  -MS     Posttreatment Pain Rating 7/10  -MS     Pain Location - Side/Orientation Right  -MS     Pain Location - foot  -MS     Pain Intervention(s) Medication (See MAR);Elevated;Nursing Notified;Repositioned  -MS       Row Name 12/11/23 1146          Positioning and Restraints    Pre-Treatment Position in bed  -MS     Post Treatment Position bed  -MS     In Bed notified nsg;supine;call light within reach;encouraged to call for assist;with nsg  Sitter present in room.  -MS               User Key  (r) = Recorded By, (t) = Taken By, (c) = Cosigned By      Initials Name Provider Type    Rocael Aragon, PT Physical Therapist                   Outcome Measures       Row Name 12/11/23 1146          How much help from another person do you currently need...    Turning from your back to your side while in flat bed without using bedrails? 4  -MS     Moving from lying on back to sitting on the side of a flat bed without bedrails? 4  -MS     Moving to and from a bed to a chair (including a wheelchair)? 4  -MS     Standing up from a chair using your arms (e.g., wheelchair, bedside chair)? 4  -MS     Climbing 3-5 steps with a railing? 4  -MS     To walk in hospital room? 4   -MS     AM-PAC 6 Clicks Score (PT) 24  -MS     Highest Level of Mobility Goal 8 --> Walked 250 feet or more  -MS       Row Name 12/11/23 1146          Functional Assessment    Outcome Measure Options AM-PAC 6 Clicks Basic Mobility (PT)  -MS               User Key  (r) = Recorded By, (t) = Taken By, (c) = Cosigned By      Initials Name Provider Type    MS Rocael Rueda PT Physical Therapist                  Physical Therapy Education       Title: PT OT SLP Therapies (In Progress)       Topic: Physical Therapy (Resolved)       Point: Mobility training (Resolved)       Learning Progress Summary             Patient Acceptance, E,D, VU,DU by MS at 12/11/2023 1146                         Point: Home exercise program (Resolved)       Learner Progress:  Not documented in this visit.              Point: Body mechanics (Resolved)       Learning Progress Summary             Patient Acceptance, E,D, VU,DU by MS at 12/11/2023 1146    Acceptance, E,TB, VU,DU,NR by  at 12/7/2023 1005                         Point: Precautions (Resolved)       Learning Progress Summary             Patient Acceptance, E,D, VU,DU by MS at 12/11/2023 1146                                         User Key       Initials Effective Dates Name Provider Type Discipline    MS 06/16/21 -  Rocael Rueda PT Physical Therapist PT     09/22/22 -  Sharon Dawn PT Physical Therapist PT                  PT Recommendation and Plan     Plan of Care Reviewed With: patient  Outcome Evaluation: Pt. is currently independent with functional mobility and has no further questions/concerns regarding functional mobility or home safety.  Encouraged pt. to continue ambulation with nursing staff while here in the hospital.  Otherwise, P.T. will sign off.     Time Calculation:         PT Charges       Row Name 12/11/23 1147             Time Calculation    Start Time 1110  -MS      Stop Time 1122  -MS      Time Calculation (min) 12 min  -MS      PT Received On  12/11/23  -MS         Time Calculation- PT    Total Timed Code Minutes- PT 11 minute(s)  -MS                User Key  (r) = Recorded By, (t) = Taken By, (c) = Cosigned By      Initials Name Provider Type    Rocael Aragon, PT Physical Therapist                  Therapy Charges for Today       Code Description Service Date Service Provider Modifiers Qty    [id number]  PT THERAPEUTIC ACT EA 15 MIN 12/11/2023 Rocael Rueda, PT GP 1            PT G-Codes  Outcome Measure Options: AM-PAC 6 Clicks Basic Mobility (PT)  AM-PAC 6 Clicks Score (PT): 24  AM-PAC 6 Clicks Score (OT): 15  Modified Dylan Scale: 4 - Moderately severe disability.  Unable to walk without assistance, and unable to attend to own bodily needs without assistance.    PT Discharge Summary  Reason for Discharge: Independent  Discharge Destination: Home    Rocael Rueda, PT  12/11/2023

## 2023-12-11 NOTE — THERAPY DISCHARGE NOTE
[facility]  [facility]  [phone]    ACCESS CENTER STATEMENT OF DISPOSITION        I, Corinna VALE Almonte, was assessed in the [facility] at [facility] on 12/11/2023.  I understand the recommendations below and what follow-up action is expected of me.    Outpt Counseling for :    -[facility]-[phone]    -Breanna Ling,KWET-[phone] online only    -Breanna Zapata,LPEW-[phone]                ________________________________  Patient/Parent/Guardian/POA Signature    ________________________________  Clinician Signature    12/11/2023  09:25 EST

## 2023-12-11 NOTE — PROGRESS NOTES
Continued Stay Note  [facility]     Patient Name: Corinna Almonte  MRN: [id number]  Today's Date: 12/11/2023    Admit Date: 12/6/2023    Plan: Home with spouse   Discharge Plan       Row Name 12/11/23 1122       Plan    Plan Home with spouse    Patient/Family in Agreement with Plan yes    Plan Comments Pt's plan remains home with spouse at d/c. GI plans EGD 12/12. CCP to follow for needs. Lilly Stephens LCSW                   Discharge Codes    No documentation.                 Expected Discharge Date and Time       Expected Discharge Date Expected Discharge Time    Dec 12, 2023               Leonela Stephens LCSW

## 2023-12-11 NOTE — PROGRESS NOTES
[facility]  Inpatient Progress Note    Reason for Follow Up:  Cirrhosis, EtOH hepatitis    Subjective     Interval History:   Pt reports she feels fine. No GI complaints this morning.  She wants to go home today, regardless if we okay or not.   No rectal bleeding, hematemesis, melena. No abdominal pain.   Spoke w/ RN: periods of confusion      Current Facility-Administered Medications:     [START ON 12/12/2023] acetaminophen (TYLENOL) tablet 650 mg, 650 mg, Oral, Once **OR** [START ON 12/12/2023] acetaminophen (TYLENOL) 160 MG/5ML oral solution 650 mg, 650 mg, Oral, Once **OR** [START ON 12/12/2023] acetaminophen (TYLENOL) suppository 650 mg, 650 mg, Rectal, Once, Nuha Banerjee MD PhD    sennosides-docusate (PERICOLACE) 8.6-50 MG per tablet 2 tablet, 2 tablet, Oral, BID PRN **AND** polyethylene glycol (MIRALAX) packet 17 g, 17 g, Oral, Daily PRN **AND** bisacodyl (DULCOLAX) EC tablet 5 mg, 5 mg, Oral, Daily PRN **AND** bisacodyl (DULCOLAX) suppository 10 mg, 10 mg, Rectal, Daily PRN, Amina Buchanan MD    dextrose (D50W) (25 g/50 mL) IV injection 25 g, 25 g, Intravenous, Q15 Min PRN, Amina Buchanan MD    dextrose (GLUTOSE) oral gel 15 g, 15 g, Oral, Q15 Min PRN, Amina Buchanan MD    Diclofenac Sodium 3 % gel 2 g, 2 g, Topical, BID PRN, Rocael Driver MD    [START ON 12/12/2023] diphenhydrAMINE (BENADRYL) capsule 25 mg, 25 mg, Oral, Once **OR** [START ON 12/12/2023] diphenhydrAMINE (BENADRYL) injection 25 mg, 25 mg, Intravenous, Once, Nuha Banerjee MD PhD    folic acid (FOLVITE) tablet 1 mg, 1 mg, Oral, Daily, Amnia Buchanan MD, 1 mg at 12/11/23 0806    glucagon (GLUCAGEN) injection 1 mg, 1 mg, Intramuscular, Q15 Min PRN, Amina Buchanan MD    insulin glargine (LANTUS, SEMGLEE) injection 10 Units, 10 Units, Subcutaneous, Q12H, Rocael Driver MD, 10 Units at 12/11/23 0806    insulin lispro (HUMALOG/ADMELOG) injection 2-7 Units, 2-7 Units, Subcutaneous, 4x  Daily AC & at Bedtime, Amina Buchanan MD, 5 Units at 12/11/23 0806    insulin lispro (HUMALOG/ADMELOG) injection 7 Units, 7 Units, Subcutaneous, TID With Meals, Rocael Driver MD, 7 Units at 12/11/23 0806    lactulose (CHRONULAC) 10 GM/15ML solution 30 g, 30 g, Oral, BID, Cally Gonsalves PA-C, 30 g at 12/11/23 0807    levothyroxine (SYNTHROID, LEVOTHROID) tablet 100 mcg, 100 mcg, Oral, Q AM, Amina Buchanan MD, 100 mcg at 12/11/23 0517    LORazepam (ATIVAN) tablet 1 mg, 1 mg, Oral, Q1H PRN, 1 mg at 12/07/23 1200 **OR** midazolam (VERSED) injection 2 mg, 2 mg, Intravenous, Q1H PRN, 2 mg at 12/09/23 2302 **OR** LORazepam (ATIVAN) tablet 2 mg, 2 mg, Oral, Q1H PRN, 2 mg at 12/10/23 0009 **OR** midazolam (VERSED) injection 4 mg, 4 mg, Intravenous, Q1H PRN, 4 mg at 12/09/23 0329 **OR** midazolam (VERSED) injection 4 mg, 4 mg, Intravenous, Q15 Min PRN, 4 mg at 12/09/23 0835 **OR** midazolam (VERSED) injection 4 mg, 4 mg, Intramuscular, Q15 Min PRN, Amina Buchanan MD    Magnesium Standard Dose Replacement - Follow Nurse / BPA Driven Protocol, , Does not apply, PRN, Mel Gentile MD    melatonin tablet 5 mg, 5 mg, Oral, Nightly PRN, Amina Buchanan MD, 5 mg at 12/08/23 1919    ondansetron (ZOFRAN) tablet 4 mg, 4 mg, Oral, Q6H PRN, 4 mg at 12/06/23 2245 **OR** ondansetron (ZOFRAN) injection 4 mg, 4 mg, Intravenous, Q6H PRN, Amina Buchanan MD    pantoprazole (PROTONIX) EC tablet 40 mg, 40 mg, Oral, BID AC, Petra Maier MD, 40 mg at 12/11/23 0806    Potassium Replacement - Follow Nurse / BPA Driven Protocol, , Does not apply, Seferino VILLAFUERTE April Perry, MD    prednisoLONE sodium phosphate (ORAPRED) 15 MG/5ML oral solution 40 mg, 40 mg, Oral, Daily, Amina Buchanan MD, 40 mg at 12/10/23 0856    riFAXIMin (XIFAXAN) tablet 550 mg, 550 mg, Oral, Q12H, Petra Maier MD, 550 mg at 12/11/23 0806    [COMPLETED] Insert Peripheral IV, , , Once **AND** sodium chloride 0.9 % flush 10 mL,  10 mL, Intravenous, PRN, Mel Gentile MD    sodium chloride 0.9 % flush 10 mL, 10 mL, Intravenous, Q12H, Amina Buchanan MD, 10 mL at 12/11/23 0806    sodium chloride 0.9 % flush 10 mL, 10 mL, Intravenous, PRN, Amina Buchanan MD    sodium chloride 0.9 % infusion 40 mL, 40 mL, Intravenous, PRN, Amina Buchanan MD    sodium chloride 0.9 % infusion, 75 mL/hr, Intravenous, Continuous, Amina Buchanan MD, Stopped at 12/07/23 1425    thiamine (B-1) injection 200 mg, 200 mg, Intravenous, Q8H, 200 mg at 12/11/23 0517 **FOLLOWED BY** [START ON 12/12/2023] thiamine (VITAMIN B-1) tablet 100 mg, 100 mg, Oral, Daily, Amina Buchanan MD    traMADol (ULTRAM) tablet 50 mg, 50 mg, Oral, Q6H PRN, Rocael Driver MD, 50 mg at 12/10/23 1629        Objective     Vital Signs  Temp:  [98.1 °F (36.7 °C)-98.8 °F (37.1 °C)] 98.6 °F (37 °C)  Heart Rate:  [] 88  Resp:  [18] 18  BP: (117-138)/(85-98) 138/87  Body mass index is 20.13 kg/m².    Intake/Output Summary (Last 24 hours) at 12/11/2023 0916  Last data filed at 12/10/2023 1751  Gross per 24 hour   Intake 440 ml   Output --   Net 440 ml     No intake/output data recorded.     Physical Exam:   General: patient awake, alert and cooperative   Eyes: Normal lids and lashes. +Icteric sclera   Abdomen: soft, nontender, nondistended; normal bowel sounds. No asterixis       Results Review:     I reviewed the patient's new clinical results.    Results from last 7 days   Lab Units 12/11/23  0517 12/10/23  0633 12/09/23  0652   WBC 10*3/mm3 5.35 5.09 5.25   HEMOGLOBIN g/dL 10.9* 10.0* 9.0*   HEMATOCRIT % 33.2* 28.8* 26.8*   PLATELETS 10*3/mm3 104*  104* 69*  69* 54*     Results from last 7 days   Lab Units 12/11/23  0517 12/10/23  1759 12/10/23  0633 12/09/23  0652   SODIUM mmol/L 134*  --  133* 136   POTASSIUM mmol/L 5.4* 4.8 3.5 3.4*   CHLORIDE mmol/L 101  --  99 101   CO2 mmol/L 21.5*  --  23.0 22.6   BUN mg/dL 10  --  14 17   CREATININE mg/dL 0.53*  --   0.48* 0.39*   CALCIUM mg/dL 9.1  --  9.2 8.5*   BILIRUBIN mg/dL 5.9*  --  6.2* 6.2*   ALK PHOS U/L 362*  --  317* 250*   ALT (SGPT) U/L 57*  --  46* 39*   AST (SGOT) U/L 119*  --  114* 106*   GLUCOSE mg/dL 326*  --  433* 243*     Results from last 7 days   Lab Units 12/11/23  0517 12/10/23  0633 12/09/23  2104   INR  1.47* 1.59* 1.74*     Lab Results   Lab Value Date/Time    LIPASE 43 12/03/2016 0657    LIPASE 51 12/02/2016 0647    LIPASE 62 (H) 12/01/2016 0753    LIPASE 87 (H) 11/30/2016 1612       Radiology:  MRI Brain With & Without Contrast   Final Result   1. No acute intracranial abnormality.   No abnormal enhancement.           This report was finalized on 12/9/2023 9:00 PM by Dr. Mary Jo Sanchez M.D on Workstation: [id number]          CT Abdomen Pelvis With Contrast   Final Result          Assessment & Plan     Active Hospital Problems    Diagnosis     **Alcohol withdrawal seizure     Coagulopathy     Severe malnutrition     Thrombocytopenia     Anemia     Alcoholic hepatitis     Alcoholic cirrhosis     Esophageal varices     Pancytopenia     Hypomagnesemia     Hypokalemia     Type 2 diabetes mellitus with hyperglycemia     Anxiety     Depression          Assessment:   Decompensated cirrhosis with reported history of ascites, esophageal varices, and HE  [facility]  Elevated ammonia   AFP wnl  PT 22.9, INR 1.99   Alcoholic hepatitis/elevated LFTs - started on steroids 12/7  Hematemesis  Occurred after swallowing blood after biting lip/tongue  Alcohol abuse  Withdrawals     All problems new to me   Plan:   Recheck [place] score 12/14 to see if she responded to steroids.   We can plan EGD tomorrow 12/12. Npo at midnight.  Decrease lactulose; please titrate to about 2-3 BMs per day. Hold for diarrhea. Continue xifaxan  EtOH abstinence indefinitely recommended to patient  Continue PPI  Continue to trend LFTs/INR/H/H. Monitor for worsening GI bleed  Outpatient referral to either Dr. Marcos  Carroll or Cooper Lucas at [facility] for chronic pancreatitic and pancreas duct stone    I discussed the patients findings and my recommendations with patient.         Alok Neves PA-C  [facility]  [address] [place] [address]  Office: [phone]

## 2023-12-12 ENCOUNTER — ANESTHESIA (OUTPATIENT)
Dept: GASTROENTEROLOGY | Facility: HOSPITAL | Age: 38
End: 2023-12-12
Payer: OTHER GOVERNMENT

## 2023-12-12 ENCOUNTER — ANESTHESIA EVENT (OUTPATIENT)
Dept: GASTROENTEROLOGY | Facility: HOSPITAL | Age: 38
End: 2023-12-12
Payer: OTHER GOVERNMENT

## 2023-12-12 ENCOUNTER — READMISSION MANAGEMENT (OUTPATIENT)
Dept: CALL CENTER | Facility: HOSPITAL | Age: 38
End: 2023-12-12
Payer: OTHER GOVERNMENT

## 2023-12-12 VITALS
RESPIRATION RATE: 16 BRPM | HEART RATE: 85 BPM | DIASTOLIC BLOOD PRESSURE: 79 MMHG | TEMPERATURE: 98.7 F | OXYGEN SATURATION: 98 % | SYSTOLIC BLOOD PRESSURE: 120 MMHG | WEIGHT: 117.28 LBS | BODY MASS INDEX: 20.02 KG/M2 | HEIGHT: 64 IN

## 2023-12-12 PROBLEM — E87.6 HYPOKALEMIA: Status: RESOLVED | Noted: 2023-12-06 | Resolved: 2023-12-12

## 2023-12-12 PROBLEM — E83.42 HYPOMAGNESEMIA: Status: RESOLVED | Noted: 2023-12-06 | Resolved: 2023-12-12

## 2023-12-12 PROBLEM — D61.818 PANCYTOPENIA: Status: RESOLVED | Noted: 2023-12-06 | Resolved: 2023-12-12

## 2023-12-12 LAB
ALBUMIN SERPL-MCNC: 3.5 G/DL (ref 3.5–5.2)
ALBUMIN/GLOB SERPL: 1.3 G/DL
ALP SERPL-CCNC: 292 U/L (ref 39–117)
ALT SERPL W P-5'-P-CCNC: 51 U/L (ref 1–33)
ANION GAP SERPL CALCULATED.3IONS-SCNC: 10 MMOL/L (ref 5–15)
AST SERPL-CCNC: 93 U/L (ref 1–32)
BASOPHILS # BLD AUTO: 0.01 10*3/MM3 (ref 0–0.2)
BASOPHILS NFR BLD AUTO: 0.2 % (ref 0–1.5)
BILIRUB SERPL-MCNC: 4 MG/DL (ref 0–1.2)
BUN SERPL-MCNC: 11 MG/DL (ref 6–20)
BUN/CREAT SERPL: 22.4 (ref 7–25)
CALCIUM SPEC-SCNC: 9 MG/DL (ref 8.6–10.5)
CHLORIDE SERPL-SCNC: 99 MMOL/L (ref 98–107)
CO2 SERPL-SCNC: 22 MMOL/L (ref 22–29)
CREAT SERPL-MCNC: 0.49 MG/DL (ref 0.57–1)
DEPRECATED RDW RBC AUTO: 48.8 FL (ref 37–54)
EGFRCR SERPLBLD CKD-EPI 2021: 123.1 ML/MIN/1.73
EOSINOPHIL # BLD AUTO: 0.01 10*3/MM3 (ref 0–0.4)
EOSINOPHIL NFR BLD AUTO: 0.2 % (ref 0.3–6.2)
ERYTHROCYTE [DISTWIDTH] IN BLOOD BY AUTOMATED COUNT: 13.9 % (ref 12.3–15.4)
GLOBULIN UR ELPH-MCNC: 2.7 GM/DL
GLUCOSE BLDC GLUCOMTR-MCNC: 116 MG/DL (ref 70–130)
GLUCOSE BLDC GLUCOMTR-MCNC: 177 MG/DL (ref 70–130)
GLUCOSE BLDC GLUCOMTR-MCNC: 401 MG/DL (ref 70–130)
GLUCOSE SERPL-MCNC: 402 MG/DL (ref 65–99)
HCT VFR BLD AUTO: 28.7 % (ref 34–46.6)
HGB BLD-MCNC: 9.8 G/DL (ref 12–15.9)
IMM GRANULOCYTES # BLD AUTO: 0.03 10*3/MM3 (ref 0–0.05)
IMM GRANULOCYTES NFR BLD AUTO: 0.6 % (ref 0–0.5)
LYMPHOCYTES # BLD AUTO: 0.76 10*3/MM3 (ref 0.7–3.1)
LYMPHOCYTES NFR BLD AUTO: 16.4 % (ref 19.6–45.3)
MCH RBC QN AUTO: 32.9 PG (ref 26.6–33)
MCHC RBC AUTO-ENTMCNC: 34.1 G/DL (ref 31.5–35.7)
MCV RBC AUTO: 96.3 FL (ref 79–97)
MONOCYTES # BLD AUTO: 0.51 10*3/MM3 (ref 0.1–0.9)
MONOCYTES NFR BLD AUTO: 11 % (ref 5–12)
NEUTROPHILS NFR BLD AUTO: 3.32 10*3/MM3 (ref 1.7–7)
NEUTROPHILS NFR BLD AUTO: 71.6 % (ref 42.7–76)
NRBC BLD AUTO-RTO: 0 /100 WBC (ref 0–0.2)
PLATELET # BLD AUTO: 104 10*3/MM3 (ref 140–450)
PMV BLD AUTO: 12.2 FL (ref 6–12)
POTASSIUM SERPL-SCNC: 4.3 MMOL/L (ref 3.5–5.2)
PROT SERPL-MCNC: 6.2 G/DL (ref 6–8.5)
RBC # BLD AUTO: 2.98 10*6/MM3 (ref 3.77–5.28)
SODIUM SERPL-SCNC: 131 MMOL/L (ref 136–145)
WBC NRBC COR # BLD AUTO: 4.64 10*3/MM3 (ref 3.4–10.8)

## 2023-12-12 PROCEDURE — 25010000002 ONDANSETRON PER 1 MG: Performed by: INTERNAL MEDICINE

## 2023-12-12 PROCEDURE — 63710000001 INSULIN LISPRO (HUMAN) PER 5 UNITS: Performed by: STUDENT IN AN ORGANIZED HEALTH CARE EDUCATION/TRAINING PROGRAM

## 2023-12-12 PROCEDURE — 0DJ08ZZ INSPECTION OF UPPER INTESTINAL TRACT, VIA NATURAL OR ARTIFICIAL OPENING ENDOSCOPIC: ICD-10-PCS | Performed by: INTERNAL MEDICINE

## 2023-12-12 PROCEDURE — 25010000002 PROPOFOL 10 MG/ML EMULSION: Performed by: ANESTHESIOLOGY

## 2023-12-12 PROCEDURE — 85025 COMPLETE CBC W/AUTO DIFF WBC: CPT | Performed by: HOSPITALIST

## 2023-12-12 PROCEDURE — 80053 COMPREHEN METABOLIC PANEL: CPT | Performed by: HOSPITALIST

## 2023-12-12 PROCEDURE — 63710000001 INSULIN GLARGINE PER 5 UNITS: Performed by: INTERNAL MEDICINE

## 2023-12-12 PROCEDURE — 63710000001 PREDNISOLONE PER 5 MG: Performed by: INTERNAL MEDICINE

## 2023-12-12 PROCEDURE — 63710000001 INSULIN LISPRO (HUMAN) PER 5 UNITS: Performed by: INTERNAL MEDICINE

## 2023-12-12 PROCEDURE — 82948 REAGENT STRIP/BLOOD GLUCOSE: CPT

## 2023-12-12 RX ORDER — PREDNISOLONE SODIUM PHOSPHATE 15 MG/5ML
SOLUTION ORAL
Qty: 60 ML | Refills: 0 | Status: SHIPPED | OUTPATIENT
Start: 2023-12-13 | End: 2023-12-21

## 2023-12-12 RX ORDER — LANOLIN ALCOHOL/MO/W.PET/CERES
100 CREAM (GRAM) TOPICAL DAILY
Start: 2023-12-13

## 2023-12-12 RX ORDER — PROPOFOL 10 MG/ML
VIAL (ML) INTRAVENOUS AS NEEDED
Status: DISCONTINUED | OUTPATIENT
Start: 2023-12-12 | End: 2023-12-12 | Stop reason: SURG

## 2023-12-12 RX ORDER — INSULIN LISPRO 100 [IU]/ML
10 INJECTION, SOLUTION INTRAVENOUS; SUBCUTANEOUS
COMMUNITY

## 2023-12-12 RX ORDER — LIDOCAINE HYDROCHLORIDE 20 MG/ML
INJECTION, SOLUTION INFILTRATION; PERINEURAL AS NEEDED
Status: DISCONTINUED | OUTPATIENT
Start: 2023-12-12 | End: 2023-12-12 | Stop reason: SURG

## 2023-12-12 RX ORDER — FOLIC ACID 1 MG/1
1 TABLET ORAL DAILY
Start: 2023-12-13

## 2023-12-12 RX ORDER — SODIUM CHLORIDE 9 MG/ML
INJECTION, SOLUTION INTRAVENOUS CONTINUOUS PRN
Status: DISCONTINUED | OUTPATIENT
Start: 2023-12-12 | End: 2023-12-12 | Stop reason: SURG

## 2023-12-12 RX ORDER — LACTULOSE 10 G/15ML
30 SOLUTION ORAL 2 TIMES DAILY
Qty: 2700 ML | Refills: 0 | Status: SHIPPED | OUTPATIENT
Start: 2023-12-12

## 2023-12-12 RX ADMIN — Medication 100 MG: at 13:26

## 2023-12-12 RX ADMIN — INSULIN LISPRO 8 UNITS: 100 INJECTION, SOLUTION INTRAVENOUS; SUBCUTANEOUS at 13:26

## 2023-12-12 RX ADMIN — RIFAXIMIN 550 MG: 550 TABLET ORAL at 13:27

## 2023-12-12 RX ADMIN — INSULIN LISPRO 7 UNITS: 100 INJECTION, SOLUTION INTRAVENOUS; SUBCUTANEOUS at 08:11

## 2023-12-12 RX ADMIN — PANTOPRAZOLE SODIUM 40 MG: 40 TABLET, DELAYED RELEASE ORAL at 13:28

## 2023-12-12 RX ADMIN — LACTULOSE 30 G: 10 SOLUTION ORAL at 13:26

## 2023-12-12 RX ADMIN — SODIUM CHLORIDE: 9 INJECTION, SOLUTION INTRAVENOUS at 11:37

## 2023-12-12 RX ADMIN — ONDANSETRON 4 MG: 2 INJECTION INTRAMUSCULAR; INTRAVENOUS at 13:26

## 2023-12-12 RX ADMIN — PROPOFOL 150 MG: 10 INJECTION, EMULSION INTRAVENOUS at 11:42

## 2023-12-12 RX ADMIN — LIDOCAINE HYDROCHLORIDE 60 MG: 20 INJECTION, SOLUTION INFILTRATION; PERINEURAL at 11:42

## 2023-12-12 RX ADMIN — INSULIN GLARGINE 10 UNITS: 100 INJECTION, SOLUTION SUBCUTANEOUS at 08:11

## 2023-12-12 RX ADMIN — Medication 10 ML: at 13:28

## 2023-12-12 RX ADMIN — PREDNISOLONE SODIUM PHOSPHATE 40 MG: 15 SOLUTION ORAL at 13:27

## 2023-12-12 RX ADMIN — TRAMADOL HYDROCHLORIDE 50 MG: 50 TABLET ORAL at 13:28

## 2023-12-12 RX ADMIN — PROPOFOL 50 MG: 10 INJECTION, EMULSION INTRAVENOUS at 11:45

## 2023-12-12 RX ADMIN — FOLIC ACID 1 MG: 1 TABLET ORAL at 13:26

## 2023-12-12 RX ADMIN — LEVOTHYROXINE SODIUM 100 MCG: 100 TABLET ORAL at 13:26

## 2023-12-12 NOTE — ANESTHESIA PREPROCEDURE EVALUATION
Anesthesia Evaluation     NPO Solid Status: > 8 hours             Airway   Mallampati: II  Dental      Pulmonary    (-) sleep apnea, not a smoker    ROS comment: Negative patient screen for CHARITY    Cardiovascular         Neuro/Psych  (+) psychiatric history Anxiety and Depression  GI/Hepatic/Renal/Endo    (+) liver disease, diabetes mellitus using insulin, thyroid problem hypothyroidism    Musculoskeletal     Abdominal    Substance History      OB/GYN          Other                    Anesthesia Plan    ASA 3     MAC       Anesthetic plan, risks, benefits, and alternatives have been provided, discussed and informed consent has been obtained with: patient.    CODE STATUS:    Level Of Support Discussed With: Patient  Code Status (Patient has no pulse and is not breathing): CPR (Attempt to Resuscitate)  Medical Interventions (Patient has pulse or is breathing): Full Support

## 2023-12-12 NOTE — PROGRESS NOTES
Case Management Discharge Note      Final Note: Home, PT signed off, no needs noted.         Selected Continued Care - Admitted Since 12/6/2023       Destination    No services have been selected for the patient.                Durable Medical Equipment    No services have been selected for the patient.                Dialysis/Infusion    No services have been selected for the patient.                Home Medical Care    No services have been selected for the patient.                Therapy    No services have been selected for the patient.                Community Resources    No services have been selected for the patient.                Community & DME    No services have been selected for the patient.                    Transportation Services  Private: Car    Final Discharge Disposition Code: 01 - home or self-care

## 2023-12-12 NOTE — NURSING NOTE
Access center follow up regarding ETOH: chart reviewed. Last CIWA score of 3 noted. Ativan given for anxiety earlier in the evening. Pt has declined [facility] resources from multiple offers however was accepting of mental health OP resources. Following for support.

## 2023-12-12 NOTE — ANESTHESIA POSTPROCEDURE EVALUATION
"Patient: Corinna Almonte    Procedure Summary       Date: 12/12/23 Room / Location:  [facility] /  [facility]    Anesthesia Start: 1137 Anesthesia Stop: 1153    Procedure: ESOPHAGOGASTRODUODENOSCOPY (Esophagus) Diagnosis:       Anemia, unspecified type      (Anemia, unspecified type [D64.9])    Surgeons: Yony Richardson MD Provider: Clemente Mata MD    Anesthesia Type: MAC ASA Status: 3            Anesthesia Type: MAC    Vitals  Vitals Value Taken Time   /69 12/12/23 1153   Temp     Pulse 75 12/12/23 1155   Resp 16 12/12/23 1153   SpO2 96 % 12/12/23 1155   Vitals shown include unfiled device data.        Post Anesthesia Care and Evaluation    Pain management: adequate    Airway patency: patent  Anesthetic complications: No anesthetic complications    Cardiovascular status: acceptable  Respiratory status: acceptable  Hydration status: acceptable    Comments: /69 (BP Location: Left arm, Patient Position: Lying)   Pulse 77   Temp 37 °C (98.6 °F) (Oral)   Resp 16   Ht 162.6 cm (64\")   Wt 53.2 kg (117 lb 4.6 oz)   LMP  (LMP Unknown) Comment: hcg qual neg 12/6/23  SpO2 96%   BMI 20.13 kg/m²       "

## 2023-12-12 NOTE — DISCHARGE SUMMARY
[facility]    Date of Discharge:  12/12/2023    PCP: System, Provider Not In    Discharge Diagnosis:   Active Hospital Problems    Diagnosis  POA    **Alcohol withdrawal seizure [F10.939, R56.9]  Yes    Coagulopathy [D68.9]  Yes    Severe malnutrition [E43]  Yes    Thrombocytopenia [D69.6]  Yes    Anemia [D64.9]  Yes    Alcoholic hepatitis [K70.10]  Yes    Alcoholic cirrhosis [K70.30]  Yes    Esophageal varices [I85.00]  Yes    Type 2 diabetes mellitus with hyperglycemia [E11.65]  Yes    Anxiety [F41.9]  Yes    Depression [F32.A]  Yes      Resolved Hospital Problems    Diagnosis Date Resolved POA    Pancytopenia [D61.818] 12/12/2023 Yes    Hypomagnesemia [E83.42] 12/12/2023 Yes    Hypokalemia [E87.6] 12/12/2023 Yes     Procedures Performed  Procedure(s):  ESOPHAGOGASTRODUODENOSCOPY  12/12 1132 [facility]  Consults       Date and Time Order Name Status Description    12/8/2023  8:06 AM Inpatient Pulmonology Consult      12/7/2023  3:49 PM Inpatient Neurology Consult General Completed     12/6/2023  9:57 PM Inpatient Gastroenterology Consult Completed     12/6/2023  9:57 PM Hematology & Oncology Inpatient Consult Completed     12/6/2023  6:53 PM [facility] (on-call MD unless specified) Details            Hospital Course  Please see history and physical for details. Patient is a 38 y.o. female who presented to the hospital with witnessed seizure-like activity by her .  She apparently receives most of her medical care at the [facility].  She was apparently lying on her side when she put her arms above her head and started screaming unintelligibly.  She clenched her jaw shut and bit her tongue and was felt to be postictal afterwards.  She drinks apparently a half a gallon of liquor a day at baseline but did quit drinking 2 days before this episode.    Neurology was consulted and she did not have any further seizure activity this admission.  MRI of the brain  was obtained with no acute findings.  Routine EEG was obtained and revealed excessive beta activity with no potential epileptogenic seizure activity or focal slowing.  There were no plans for AED therapy and neurology signed off.  Her electrolytes were monitored and replaced as needed.  She was maintained on seizure precautions and treated for alcohol withdrawal per protocol.   She was noted to have alcoholic cirrhosis as well as alcoholic hepatitis.  Gastroenterology was consulted as she does have a history of GI bleeding with esophageal varices as well as PSC.  She was placed on lactulose as well as Xifaxan and PPI therapy.  She was started on prednisolone for alcoholic hepatitis as well.  She was ultimately taken for EGD with GI on 12/12 which revealed grade 1 esophageal varices, portal hypertensive gastropathy, gastritis as well as congested duodenal mucosa.  No specimens were collected.  She was recommended to have a repeat EGD in 1 year and follow-up with [facility] upon discharge.  She should continue 40 mg of Protonix per day and avoid alcohol.  Gastroenterology has cleared her for discharge and recommends a taper of 5 mg/mL of prednisolone per day until complete.  She was noted to have a pancreatic duct stone and will need follow-up closely with her GI provider as she does have chronic pancreatitis.  She should continue a fat restricted diet.  She is tolerating a diet and denies any nausea, vomiting or abdominal pain.  She is having several bowel movements a day as a result of her lactulose.   Hematology was consulted given her coagulopathy as well as pancytopenia.  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  Returns were monitored and she did not require any platelets for her scope today.  She was intermittently given vitamin K for her elevated INR.  She will be discharged assuming clearance from  hematology today.  Her levels are currently stable at this time and she has not had any bleeding.   She did have type 2 diabetes on admission and states that she was getting 30 units of long-acting insulin and 10 units with each meal per her .  Her anion gap has remained stable and sugars have been elevating on her steroids here.  She was maintained on a regimen and sugars were elevated this morning in the 400s.  Her most recent sugar is 116 and she has been getting less insulin here than she would at home.  Her  has a good understanding of how to treat her diabetes and she will be discharged with her home regimen and close [facility].   The day of discharge, she denies any chest pain, dyspnea, cough, fever or chills.  She is very eager to leave the hospital and has been educated on the need to abstain from alcohol.  She was followed by access and her while admitted.  She was evaluated by PT and they have signed off.  Need outpatient follow-up with her PCP for ongoing medical management.  She should follow-up with GI as advised.  Long-term prognosis is poor if she continues to drink at this point.    I discussed the patient's findings and my recommendations with patient, family, nursing staff, consulting provider, and Dr. Driver .    Condition on Discharge: Improved.     Temp:  [98.6 °F (37 °C)-99.5 °F (37.5 °C)] 98.6 °F (37 °C)  Heart Rate:  [64-86] 85  Resp:  [16-18] 16  BP: (100-138)/(69-91) 118/86  Body mass index is 20.13 kg/m².    Physical Exam  Vitals and nursing note reviewed.   Constitutional:       Appearance: She is chronically ill-appearing. She is not toxic-appearing.   Eyes:      General: Scleral icterus present.   Cardiovascular:      Rate and Rhythm: Normal rate and regular rhythm.      Pulses: Normal pulses.   Pulmonary:      Effort: Pulmonary effort is normal. No respiratory distress.      Breath sounds: Normal breath sounds.   Abdominal:      General:  Bowel sounds are normal. There is no distension.      Palpations: Abdomen is soft.      Tenderness: There is no abdominal tenderness.   Skin:     General: Skin is warm and dry.      Findings: Bruising present.   Neurological:      General: No focal deficit present.      Mental Status: She is alert and oriented to person, place, and time.      Sensory: No sensory deficit.      Motor: Weakness present.      Coordination: Coordination normal.   Psychiatric:         Mood and Affect: Mood normal.         Behavior: Behavior normal.        Discharge Medications        New Medications        Instructions Start Date   folic acid 1 MG tablet  Commonly known as: FOLVITE   1 mg, Oral, Daily   Start Date: December 13, 2023     lactulose 10 GM/15ML solution  Commonly known as: CHRONULAC   30 g, Oral, 2 Times Daily      prednisoLONE sodium phosphate 15 MG/5ML solution  Commonly known as: ORAPRED   Take 13.3 mL by mouth Daily for 1 day, THEN 11.7 mL Daily for 1 day, THEN 10 mL Daily for 1 day, THEN 8.3 mL Daily for 1 day, THEN 6.7 mL Daily for 1 day, THEN 5 mL Daily for 1 day, THEN 3.3 mL Daily for 1 day, THEN 1.7 mL Daily for 1 day.   Start Date: December 13, 2023     riFAXIMin 550 MG tablet  Commonly known as: XIFAXAN   550 mg, Oral, Every 12 Hours Scheduled      thiamine 100 MG tablet  Commonly known as: VITAMIN B1   100 mg, Oral, Daily   Start Date: December 13, 2023            Continue These Medications        Instructions Start Date   insulin glargine 100 UNIT/ML injection  Commonly known as: LANTUS, SEMGLEE   30 Units, Subcutaneous, Nightly      Insulin Lispro 100 UNIT/ML injection  Commonly known as: humaLOG   10 Units, Subcutaneous, 3 Times Daily Before Meals      levothyroxine 100 MCG tablet  Commonly known as: SYNTHROID, LEVOTHROID   Oral, Daily      pantoprazole 40 MG EC tablet  Commonly known as: PROTONIX   40 mg, Oral, Daily             Stop These Medications      chlordiazePOXIDE 10 MG capsule  Commonly known as:  LIBRIUM             Diet Instructions       Diet: Diabetic Diets; Consistent Carbohydrate; Regular Texture (IDDSI 7); Thin (IDDSI 0)      Discharge Diet: Diabetic Diets    Diabetic Diet: Consistent Carbohydrate    Texture: Regular Texture (IDDSI 7)    Fluid Consistency: Thin (IDDSI 0)           Activity Instructions       Activity as Tolerated             Additional Instructions for the Follow-ups that You Need to Schedule       Discharge Follow-up with PCP   As directed       Currently Documented PCP:    System, Provider Not In    PCP Phone Number:    None     Follow Up Details: see in 1 week        [facility] Follow[facility]; [facility] Weeks   As directed      To: [facility]   Follow Up: 2 Weeks               Follow-up Information       System, Provider Not In .    Why: see in 1 week  Contact information:  [facility] [address]                           Test Results Pending at Discharge     VIRGEN Hurtado  12/12/23  14:21 EST    Discharge time spent greater than 30 minutes.

## 2023-12-12 NOTE — PLAN OF CARE
Problem: Skin Injury Risk Increased  Goal: Skin Health and Integrity  Outcome: Ongoing, Progressing  Intervention: Optimize Skin Protection  Recent Flowsheet Documentation  Taken 12/11/2023 2200 by Allison Jackson RN  Head of Bed (HOB) Positioning: HOB at 20 degrees  Taken 12/11/2023 1944 by Allison Jackson RN  Pressure Reduction Techniques: frequent weight shift encouraged  Head of Bed (HOB) Positioning: HOB at 20 degrees  Pressure Reduction Devices: positioning supports utilized  Skin Protection: adhesive use limited     Problem: Diabetes Comorbidity  Goal: Blood Glucose Level Within Targeted Range  Outcome: Ongoing, Progressing     Problem: Hypertension Comorbidity  Goal: Blood Pressure in Desired Range  Outcome: Ongoing, Progressing  Intervention: Maintain Blood Pressure Management  Recent Flowsheet Documentation  Taken 12/11/2023 2200 by Allsion Jackson RN  Medication Review/Management: medications reviewed  Taken 12/11/2023 1944 by Allison Jackson RN  Medication Review/Management: medications reviewed     Problem: Seizure Disorder Comorbidity  Goal: Maintenance of Seizure Control  Outcome: Ongoing, Progressing     Problem: Fall Injury Risk  Goal: Absence of Fall and Fall-Related Injury  Outcome: Ongoing, Progressing  Intervention: Identify and Manage Contributors  Recent Flowsheet Documentation  Taken 12/11/2023 2200 by Allison Jackson RN  Medication Review/Management: medications reviewed  Taken 12/11/2023 1944 by Allison Jackson RN  Medication Review/Management: medications reviewed  Intervention: Promote Injury-Free Environment  Recent Flowsheet Documentation  Taken 12/11/2023 2200 by Allison Jackson RN  Safety Promotion/Fall Prevention: safety round/check completed  Taken 12/11/2023 1944 by Allison Jackson RN  Safety Promotion/Fall Prevention: safety round/check completed     Problem: Adult Inpatient Plan of Care  Goal: Plan of Care Review  Outcome: Ongoing, Progressing  Flowsheets (Taken 12/11/2023  MSW phoned patient's daughter, Emiliano Lewis, this date at [phone] to remind her of the 1527 Maggy phone interview and name/number of enrollment   Emiliano Lewis stated that she was babysitting and did not have paper with her, so this writer called back and left the PA IEB appt date/time as Tuesday 4/19 from 1-3PM with Peewee Jones at [phone] on her voicemail  MSW will follow-up with Emiliano Lewis on 4/20 to ensure that the appt was completed  2224)  Plan of Care Reviewed With: patient  Goal: Patient-Specific Goal (Individualized)  Outcome: Ongoing, Progressing  Goal: Absence of Hospital-Acquired Illness or Injury  Outcome: Ongoing, Progressing  Intervention: Identify and Manage Fall Risk  Recent Flowsheet Documentation  Taken 12/11/2023 2200 by Allison Jackson RN  Safety Promotion/Fall Prevention: safety round/check completed  Taken 12/11/2023 1944 by Allison Jackson RN  Safety Promotion/Fall Prevention: safety round/check completed  Intervention: Prevent Skin Injury  Recent Flowsheet Documentation  Taken 12/11/2023 2200 by Allison Jackson RN  Body Position: position changed independently  Taken 12/11/2023 1944 by Allison Jackson RN  Body Position: position changed independently  Skin Protection: adhesive use limited  Intervention: Prevent and Manage VTE (Venous Thromboembolism) Risk  Recent Flowsheet Documentation  Taken 12/11/2023 1944 by Allison Jackson RN  Range of Motion: active ROM (range of motion) encouraged  Intervention: Prevent Infection  Recent Flowsheet Documentation  Taken 12/11/2023 1944 by Allison Jackson RN  Infection Prevention: single patient room provided  Goal: Optimal Comfort and Wellbeing  Outcome: Ongoing, Progressing  Intervention: Provide Person-Centered Care  Recent Flowsheet Documentation  Taken 12/11/2023 1944 by Allison Jackson RN  Trust Relationship/Rapport:   care explained   choices provided  Goal: Readiness for Transition of Care  Outcome: Ongoing, Progressing     Problem: Alcohol Withdrawal  Goal: Alcohol Withdrawal Symptom Control  Outcome: Ongoing, Progressing  Intervention: Minimize or Manage Alcohol Withdrawal Symptoms  Recent Flowsheet Documentation  Taken 12/11/2023 1944 by Allison Jackson RN  Sensory Stimulation Regulation: care clustered     Problem: Acute Neurologic Deterioration (Alcohol Withdrawal)  Goal: Optimal Neurologic Function  Outcome: Ongoing, Progressing  Intervention: Minimize or Manage Acute  Neurologic Symptoms  Recent Flowsheet Documentation  Taken 12/11/2023 1944 by Allison Jackson RN  Sensory Stimulation Regulation: care clustered     Problem: Substance Misuse (Alcohol Withdrawal)  Goal: Readiness for Change Identified  Outcome: Ongoing, Progressing  Intervention: Partner to Facilitate Behavior Change  Recent Flowsheet Documentation  Taken 12/11/2023 1944 by Allison Jackson RN  Supportive Measures: active listening utilized  Intervention: Promote Psychosocial Wellbeing  Recent Flowsheet Documentation  Taken 12/11/2023 1944 by Allison Jackson RN  Family/Support System Care: self-care encouraged   Goal Outcome Evaluation:  Plan of Care Reviewed With: patient

## 2023-12-13 NOTE — OUTREACH NOTE
Prep Survey      Flowsheet Row Responses   [facility] facility patient discharged from? [place]   Is LACE score < 7 ? No   Eligibility Readm Mgmt   Discharge diagnosis *Alcohol withdrawal seizure   Does the patient have one of the following disease processes/diagnoses(primary or secondary)? Other   Does the patient have Home health ordered? No   Is there a DME ordered? No   Prep survey completed? Yes            MOHIT CEDENO - Registered Nurse

## 2023-12-14 ENCOUNTER — READMISSION MANAGEMENT (OUTPATIENT)
Dept: CALL CENTER | Facility: HOSPITAL | Age: 38
End: 2023-12-14
Payer: OTHER GOVERNMENT

## 2023-12-14 NOTE — OUTREACH NOTE
Medical Week 1 Survey      Flowsheet Row Responses   [facility] [place] operated by [facility] patient discharged from? [place]   Does the patient have one of the following disease processes/diagnoses(primary or secondary)? Other   Week 1 attempt successful? Yes   Call start time 1642   Call end time 1643   Discharge diagnosis *Alcohol withdrawal seizure   Meds reviewed with patient/caregiver? Yes   Is the patient having any side effects they believe may be caused by any medication additions or changes? No   Does the patient have all medications ordered at discharge? Yes   Is the patient taking all medications as directed (includes completed medication regime)? Yes   Does the patient have a primary care provider?  Yes   Does the patient have an appointment with their PCP within 7 days of discharge? Greater than 7 days   Nursing Interventions Verified appointment date/time/provider   Has the patient kept scheduled appointments due by today? Yes   Psychosocial issues? No   Did the patient receive a copy of their discharge instructions? Yes   Nursing interventions Reviewed instructions with patient   What is the patient's perception of their health status since discharge? Improving   Is the patient/caregiver able to teach back signs and symptoms related to disease process for when to call PCP? Yes   Is the patient/caregiver able to teach back signs and symptoms related to disease process for when to call 911? Yes   Is the patient/caregiver able to teach back the hierarchy of who to call/visit for symptoms/problems? PCP, Specialist, Home health nurse, Urgent Care, ED, 911 Yes   Week 1 call completed? Yes   Graduated Yes   Would this patient benefit from a Referral to Amb Social Work? No   Is the patient interested in additional calls from an ambulatory ? No   Wrap up additional comments pt doing well scheduling appt in morning.   Call end time 1643            MOHIT CEDENO - Registered Nurse

## 2024-01-01 ENCOUNTER — HOSPITAL ENCOUNTER (EMERGENCY)
Facility: HOSPITAL | Age: 39
End: 2024-11-25
Attending: STUDENT IN AN ORGANIZED HEALTH CARE EDUCATION/TRAINING PROGRAM | Admitting: STUDENT IN AN ORGANIZED HEALTH CARE EDUCATION/TRAINING PROGRAM
Payer: OTHER GOVERNMENT

## 2024-01-01 DIAGNOSIS — I46.9 CARDIAC ARREST: Primary | ICD-10-CM

## 2024-01-01 PROCEDURE — 94799 UNLISTED PULMONARY SVC/PX: CPT

## 2024-01-01 PROCEDURE — 31500 INSERT EMERGENCY AIRWAY: CPT

## 2024-01-01 PROCEDURE — 99285 EMERGENCY DEPT VISIT HI MDM: CPT

## 2024-01-01 PROCEDURE — 92950 HEART/LUNG RESUSCITATION CPR: CPT

## 2024-01-01 PROCEDURE — 25010000002 EPINEPHRINE 1 MG/10ML SOLUTION PREFILLED SYRINGE: Performed by: STUDENT IN AN ORGANIZED HEALTH CARE EDUCATION/TRAINING PROGRAM

## 2024-01-01 RX ADMIN — EPINEPHRINE 1 MG: 0.1 INJECTION INTRACARDIAC; INTRAVENOUS at 09:54

## 2024-01-01 RX ADMIN — EPINEPHRINE 1 MG: 0.1 INJECTION INTRACARDIAC; INTRAVENOUS at 09:57

## 2024-01-01 RX ADMIN — EPINEPHRINE 1 MG: 0.1 INJECTION INTRACARDIAC; INTRAVENOUS at 09:51

## 2024-11-06 ENCOUNTER — HOSPITAL ENCOUNTER (INPATIENT)
Facility: HOSPITAL | Age: 39
LOS: 6 days | Discharge: HOME HEALTH | End: 2024-11-13
Attending: EMERGENCY MEDICINE | Admitting: HOSPITALIST
Payer: OTHER GOVERNMENT

## 2024-11-06 ENCOUNTER — APPOINTMENT (OUTPATIENT)
Dept: GENERAL RADIOLOGY | Facility: HOSPITAL | Age: 39
End: 2024-11-06
Payer: OTHER GOVERNMENT

## 2024-11-06 DIAGNOSIS — R04.0 EPISTAXIS: ICD-10-CM

## 2024-11-06 DIAGNOSIS — F10.139 ALCOHOL ABUSE WITH WITHDRAWAL: ICD-10-CM

## 2024-11-06 DIAGNOSIS — Z87.19 HISTORY OF ESOPHAGEAL VARICES: ICD-10-CM

## 2024-11-06 DIAGNOSIS — K92.2 ACUTE UPPER GI BLEEDING: Primary | ICD-10-CM

## 2024-11-06 DIAGNOSIS — D62 ACUTE BLOOD LOSS ANEMIA: ICD-10-CM

## 2024-11-06 DIAGNOSIS — K92.0 HEMATEMESIS, UNSPECIFIED WHETHER NAUSEA PRESENT: ICD-10-CM

## 2024-11-06 DIAGNOSIS — S00.512A ABRASION OF TONGUE, INITIAL ENCOUNTER: ICD-10-CM

## 2024-11-06 DIAGNOSIS — K70.30 ALCOHOLIC CIRRHOSIS OF LIVER WITHOUT ASCITES: ICD-10-CM

## 2024-11-06 DIAGNOSIS — E16.2 HYPOGLYCEMIA: ICD-10-CM

## 2024-11-06 DIAGNOSIS — R56.9 SEIZURE: ICD-10-CM

## 2024-11-06 DIAGNOSIS — K76.82 HEPATIC ENCEPHALOPATHY: ICD-10-CM

## 2024-11-06 LAB
AMMONIA BLD-SCNC: 88 UMOL/L (ref 11–51)
APTT PPP: 41.5 SECONDS (ref 22.7–35.4)
ATMOSPHERIC PRESS: 751.7 MMHG
BASE EXCESS BLDV CALC-SCNC: 1.7 MMOL/L (ref -2–2)
BASOPHILS # BLD AUTO: 0.03 10*3/MM3 (ref 0–0.2)
BASOPHILS NFR BLD AUTO: 0.7 % (ref 0–1.5)
CO2 BLDA-SCNC: 23.6 MMOL/L (ref 23–27)
D-LACTATE SERPL-SCNC: 2.8 MMOL/L (ref 0.5–2)
DEPRECATED RDW RBC AUTO: 42.6 FL (ref 37–54)
DEVICE COMMENT: ABNORMAL
EOSINOPHIL # BLD AUTO: 0 10*3/MM3 (ref 0–0.4)
EOSINOPHIL NFR BLD AUTO: 0 % (ref 0.3–6.2)
ERYTHROCYTE [DISTWIDTH] IN BLOOD BY AUTOMATED COUNT: 15.7 % (ref 12.3–15.4)
GLUCOSE BLDC GLUCOMTR-MCNC: 99 MG/DL (ref 70–130)
HCG SERPL QL: NEGATIVE
HCO3 BLDV-SCNC: 22.9 MMOL/L (ref 22–28)
HCT VFR BLD AUTO: 20.6 % (ref 34–46.6)
HGB BLD-MCNC: 6.8 G/DL (ref 12–15.9)
IMM GRANULOCYTES # BLD AUTO: 0.03 10*3/MM3 (ref 0–0.05)
IMM GRANULOCYTES NFR BLD AUTO: 0.7 % (ref 0–0.5)
INR PPP: 1.79 (ref 0.9–1.1)
LYMPHOCYTES # BLD AUTO: 0.49 10*3/MM3 (ref 0.7–3.1)
LYMPHOCYTES NFR BLD AUTO: 10.8 % (ref 19.6–45.3)
MCH RBC QN AUTO: 25.8 PG (ref 26.6–33)
MCHC RBC AUTO-ENTMCNC: 33.5 G/DL (ref 31.5–35.7)
MCV RBC AUTO: 77.2 FL (ref 79–97)
MODALITY: ABNORMAL
MONOCYTES # BLD AUTO: 0.2 10*3/MM3 (ref 0.1–0.9)
MONOCYTES NFR BLD AUTO: 4.4 % (ref 5–12)
NEUTROPHILS NFR BLD AUTO: 3.78 10*3/MM3 (ref 1.7–7)
NEUTROPHILS NFR BLD AUTO: 83.4 % (ref 42.7–76)
PCO2 BLDV: 22.6 MM HG (ref 41–51)
PH BLDV: 7.61 PH UNITS (ref 7.31–7.41)
PLATELET # BLD AUTO: 64 10*3/MM3 (ref 140–450)
PMV BLD AUTO: 9.7 FL (ref 6–12)
PO2 BLDV: 36.2 MM HG (ref 35–45)
PROCALCITONIN SERPL-MCNC: 0.07 NG/ML (ref 0–0.25)
PROTHROMBIN TIME: 21 SECONDS (ref 11.7–14.2)
RBC # BLD AUTO: 2.67 10*6/MM3 (ref 3.77–5.28)
SAO2 % BLDCOV: 81.8 % (ref 45–75)
TOTAL RATE: 24 BREATHS/MINUTE
WBC NRBC COR # BLD AUTO: 4.53 10*3/MM3 (ref 3.4–10.8)

## 2024-11-06 PROCEDURE — 80143 DRUG ASSAY ACETAMINOPHEN: CPT

## 2024-11-06 PROCEDURE — 83735 ASSAY OF MAGNESIUM: CPT | Performed by: EMERGENCY MEDICINE

## 2024-11-06 PROCEDURE — 85610 PROTHROMBIN TIME: CPT | Performed by: EMERGENCY MEDICINE

## 2024-11-06 PROCEDURE — 84100 ASSAY OF PHOSPHORUS: CPT | Performed by: EMERGENCY MEDICINE

## 2024-11-06 PROCEDURE — 99291 CRITICAL CARE FIRST HOUR: CPT

## 2024-11-06 PROCEDURE — 86923 COMPATIBILITY TEST ELECTRIC: CPT

## 2024-11-06 PROCEDURE — 82948 REAGENT STRIP/BLOOD GLUCOSE: CPT

## 2024-11-06 PROCEDURE — 83690 ASSAY OF LIPASE: CPT | Performed by: EMERGENCY MEDICINE

## 2024-11-06 PROCEDURE — 86901 BLOOD TYPING SEROLOGIC RH(D): CPT | Performed by: EMERGENCY MEDICINE

## 2024-11-06 PROCEDURE — P9612 CATHETERIZE FOR URINE SPEC: HCPCS

## 2024-11-06 PROCEDURE — 85730 THROMBOPLASTIN TIME PARTIAL: CPT | Performed by: EMERGENCY MEDICINE

## 2024-11-06 PROCEDURE — 82550 ASSAY OF CK (CPK): CPT | Performed by: EMERGENCY MEDICINE

## 2024-11-06 PROCEDURE — 85025 COMPLETE CBC W/AUTO DIFF WBC: CPT | Performed by: EMERGENCY MEDICINE

## 2024-11-06 PROCEDURE — 84145 PROCALCITONIN (PCT): CPT | Performed by: EMERGENCY MEDICINE

## 2024-11-06 PROCEDURE — 86850 RBC ANTIBODY SCREEN: CPT | Performed by: EMERGENCY MEDICINE

## 2024-11-06 PROCEDURE — 83605 ASSAY OF LACTIC ACID: CPT | Performed by: EMERGENCY MEDICINE

## 2024-11-06 PROCEDURE — 80053 COMPREHEN METABOLIC PANEL: CPT | Performed by: EMERGENCY MEDICINE

## 2024-11-06 PROCEDURE — 0202U NFCT DS 22 TRGT SARS-COV-2: CPT | Performed by: EMERGENCY MEDICINE

## 2024-11-06 PROCEDURE — 85384 FIBRINOGEN ACTIVITY: CPT

## 2024-11-06 PROCEDURE — 82150 ASSAY OF AMYLASE: CPT

## 2024-11-06 PROCEDURE — 82077 ASSAY SPEC XCP UR&BREATH IA: CPT | Performed by: EMERGENCY MEDICINE

## 2024-11-06 PROCEDURE — 86900 BLOOD TYPING SEROLOGIC ABO: CPT | Performed by: EMERGENCY MEDICINE

## 2024-11-06 PROCEDURE — 71045 X-RAY EXAM CHEST 1 VIEW: CPT

## 2024-11-06 PROCEDURE — 36415 COLL VENOUS BLD VENIPUNCTURE: CPT

## 2024-11-06 PROCEDURE — 93010 ELECTROCARDIOGRAM REPORT: CPT | Performed by: INTERNAL MEDICINE

## 2024-11-06 PROCEDURE — 82803 BLOOD GASES ANY COMBINATION: CPT | Performed by: EMERGENCY MEDICINE

## 2024-11-06 PROCEDURE — 84703 CHORIONIC GONADOTROPIN ASSAY: CPT | Performed by: EMERGENCY MEDICINE

## 2024-11-06 PROCEDURE — 87040 BLOOD CULTURE FOR BACTERIA: CPT | Performed by: EMERGENCY MEDICINE

## 2024-11-06 PROCEDURE — 25010000002 OCTREOTIDE PER 25 MCG: Performed by: EMERGENCY MEDICINE

## 2024-11-06 PROCEDURE — 82010 KETONE BODYS QUAN: CPT | Performed by: EMERGENCY MEDICINE

## 2024-11-06 PROCEDURE — 84484 ASSAY OF TROPONIN QUANT: CPT | Performed by: EMERGENCY MEDICINE

## 2024-11-06 PROCEDURE — 83880 ASSAY OF NATRIURETIC PEPTIDE: CPT | Performed by: EMERGENCY MEDICINE

## 2024-11-06 PROCEDURE — 82140 ASSAY OF AMMONIA: CPT | Performed by: EMERGENCY MEDICINE

## 2024-11-06 PROCEDURE — 25010000002 ONDANSETRON PER 1 MG: Performed by: EMERGENCY MEDICINE

## 2024-11-06 PROCEDURE — 84443 ASSAY THYROID STIM HORMONE: CPT | Performed by: EMERGENCY MEDICINE

## 2024-11-06 PROCEDURE — 93005 ELECTROCARDIOGRAM TRACING: CPT | Performed by: EMERGENCY MEDICINE

## 2024-11-06 RX ORDER — ONDANSETRON 2 MG/ML
4 INJECTION INTRAMUSCULAR; INTRAVENOUS ONCE
Status: COMPLETED | OUTPATIENT
Start: 2024-11-06 | End: 2024-11-06

## 2024-11-06 RX ORDER — OCTREOTIDE ACETATE 50 UG/ML
50 INJECTION, SOLUTION INTRAVENOUS; SUBCUTANEOUS ONCE
Status: COMPLETED | OUTPATIENT
Start: 2024-11-06 | End: 2024-11-06

## 2024-11-06 RX ORDER — PANTOPRAZOLE SODIUM 40 MG/10ML
80 INJECTION, POWDER, LYOPHILIZED, FOR SOLUTION INTRAVENOUS ONCE
Status: COMPLETED | OUTPATIENT
Start: 2024-11-06 | End: 2024-11-06

## 2024-11-06 RX ORDER — MIDAZOLAM HYDROCHLORIDE 1 MG/ML
1 INJECTION, SOLUTION INTRAMUSCULAR; INTRAVENOUS ONCE
Status: COMPLETED | OUTPATIENT
Start: 2024-11-06 | End: 2024-11-07

## 2024-11-06 RX ORDER — SODIUM CHLORIDE 0.9 % (FLUSH) 0.9 %
10 SYRINGE (ML) INJECTION AS NEEDED
Status: DISCONTINUED | OUTPATIENT
Start: 2024-11-06 | End: 2024-11-13 | Stop reason: HOSPADM

## 2024-11-06 RX ADMIN — OCTREOTIDE ACETATE 25 MCG/HR: 500 INJECTION, SOLUTION INTRAVENOUS; SUBCUTANEOUS at 23:48

## 2024-11-06 RX ADMIN — PANTOPRAZOLE SODIUM 80 MG: 40 INJECTION, POWDER, FOR SOLUTION INTRAVENOUS at 23:32

## 2024-11-06 RX ADMIN — PANTOPRAZOLE SODIUM 8 MG/HR: 40 INJECTION, POWDER, FOR SOLUTION INTRAVENOUS at 23:35

## 2024-11-06 RX ADMIN — OCTREOTIDE ACETATE 50 MCG: 50 INJECTION, SOLUTION INTRAVENOUS; SUBCUTANEOUS at 23:46

## 2024-11-06 RX ADMIN — ONDANSETRON 4 MG: 2 INJECTION, SOLUTION INTRAMUSCULAR; INTRAVENOUS at 23:29

## 2024-11-07 ENCOUNTER — ANESTHESIA (OUTPATIENT)
Dept: GASTROENTEROLOGY | Facility: HOSPITAL | Age: 39
End: 2024-11-07
Payer: OTHER GOVERNMENT

## 2024-11-07 ENCOUNTER — APPOINTMENT (OUTPATIENT)
Dept: CT IMAGING | Facility: HOSPITAL | Age: 39
End: 2024-11-07
Payer: OTHER GOVERNMENT

## 2024-11-07 ENCOUNTER — ANESTHESIA EVENT (OUTPATIENT)
Dept: GASTROENTEROLOGY | Facility: HOSPITAL | Age: 39
End: 2024-11-07
Payer: OTHER GOVERNMENT

## 2024-11-07 VITALS — OXYGEN SATURATION: 100 % | DIASTOLIC BLOOD PRESSURE: 80 MMHG | SYSTOLIC BLOOD PRESSURE: 106 MMHG | HEART RATE: 76 BPM

## 2024-11-07 PROBLEM — F10.139 ALCOHOL ABUSE WITH WITHDRAWAL: Status: ACTIVE | Noted: 2024-01-01

## 2024-11-07 PROBLEM — Z87.19 HISTORY OF ESOPHAGEAL VARICES: Status: ACTIVE | Noted: 2024-11-06

## 2024-11-07 PROBLEM — K92.0 HEMATEMESIS: Status: ACTIVE | Noted: 2024-11-06

## 2024-11-07 LAB
ABO GROUP BLD: NORMAL
ALBUMIN SERPL-MCNC: 3.6 G/DL (ref 3.5–5.2)
ALBUMIN/GLOB SERPL: 0.8 G/DL
ALP SERPL-CCNC: 240 U/L (ref 39–117)
ALT SERPL W P-5'-P-CCNC: 21 U/L (ref 1–33)
AMPHET+METHAMPHET UR QL: NEGATIVE
AMYLASE SERPL-CCNC: 588 U/L (ref 28–100)
ANION GAP SERPL CALCULATED.3IONS-SCNC: 12.2 MMOL/L (ref 5–15)
APAP SERPL-MCNC: <5 MCG/ML (ref 0–30)
APTT PPP: 42.8 SECONDS (ref 22.7–35.4)
AST SERPL-CCNC: 95 U/L (ref 1–32)
B PARAPERT DNA SPEC QL NAA+PROBE: NOT DETECTED
B PERT DNA SPEC QL NAA+PROBE: NOT DETECTED
B-OH-BUTYR SERPL-SCNC: 0.1 MMOL/L (ref 0.02–0.27)
BARBITURATES UR QL SCN: NEGATIVE
BASOPHILS # BLD MANUAL: 0.04 10*3/MM3 (ref 0–0.2)
BASOPHILS NFR BLD MANUAL: 1 % (ref 0–1.5)
BENZODIAZ UR QL SCN: NEGATIVE
BH BB BLOOD EXPIRATION DATE: NORMAL
BH BB BLOOD TYPE BARCODE: 5100
BH BB DISPENSE STATUS: NORMAL
BH BB PRODUCT CODE: NORMAL
BH BB UNIT NUMBER: NORMAL
BILIRUB SERPL-MCNC: 3.7 MG/DL (ref 0–1.2)
BILIRUB UR QL STRIP: NEGATIVE
BLD GP AB SCN SERPL QL: NEGATIVE
BUN SERPL-MCNC: 6 MG/DL (ref 6–20)
BUN/CREAT SERPL: 11.8 (ref 7–25)
C PNEUM DNA NPH QL NAA+NON-PROBE: NOT DETECTED
CALCIUM SPEC-SCNC: 9 MG/DL (ref 8.6–10.5)
CANNABINOIDS SERPL QL: NEGATIVE
CHLORIDE SERPL-SCNC: 100 MMOL/L (ref 98–107)
CK SERPL-CCNC: 320 U/L (ref 20–180)
CLARITY UR: CLEAR
CO2 SERPL-SCNC: 23.8 MMOL/L (ref 22–29)
COCAINE UR QL: NEGATIVE
COLOR UR: YELLOW
CREAT SERPL-MCNC: 0.51 MG/DL (ref 0.57–1)
CROSSMATCH INTERPRETATION: NORMAL
D-LACTATE SERPL-SCNC: 1.7 MMOL/L (ref 0.5–2)
D-LACTATE SERPL-SCNC: 2.2 MMOL/L (ref 0.5–2)
D-LACTATE SERPL-SCNC: 2.5 MMOL/L (ref 0.5–2)
DEPRECATED RDW RBC AUTO: 43.5 FL (ref 37–54)
DEPRECATED RDW RBC AUTO: 45 FL (ref 37–54)
EGFRCR SERPLBLD CKD-EPI 2021: 121.2 ML/MIN/1.73
EOSINOPHIL # BLD MANUAL: 0 10*3/MM3 (ref 0–0.4)
EOSINOPHIL NFR BLD MANUAL: 0 % (ref 0.3–6.2)
ERYTHROCYTE [DISTWIDTH] IN BLOOD BY AUTOMATED COUNT: 15.3 % (ref 12.3–15.4)
ERYTHROCYTE [DISTWIDTH] IN BLOOD BY AUTOMATED COUNT: 15.5 % (ref 12.3–15.4)
ETHANOL BLD-MCNC: <10 MG/DL (ref 0–10)
ETHANOL UR QL: <0.01 %
FENTANYL UR-MCNC: NEGATIVE NG/ML
FIBRINOGEN PPP-MCNC: 165 MG/DL (ref 219–464)
FLUAV SUBTYP SPEC NAA+PROBE: NOT DETECTED
FLUBV RNA ISLT QL NAA+PROBE: NOT DETECTED
GEN 5 2HR TROPONIN T REFLEX: 25 NG/L
GLOBULIN UR ELPH-MCNC: 4.8 GM/DL
GLUCOSE BLDC GLUCOMTR-MCNC: 103 MG/DL (ref 70–130)
GLUCOSE BLDC GLUCOMTR-MCNC: 117 MG/DL (ref 70–130)
GLUCOSE BLDC GLUCOMTR-MCNC: 127 MG/DL (ref 70–130)
GLUCOSE BLDC GLUCOMTR-MCNC: 140 MG/DL (ref 70–130)
GLUCOSE BLDC GLUCOMTR-MCNC: 217 MG/DL (ref 70–130)
GLUCOSE BLDC GLUCOMTR-MCNC: 34 MG/DL (ref 70–130)
GLUCOSE BLDC GLUCOMTR-MCNC: 37 MG/DL (ref 70–130)
GLUCOSE BLDC GLUCOMTR-MCNC: 39 MG/DL (ref 70–130)
GLUCOSE BLDC GLUCOMTR-MCNC: 44 MG/DL (ref 70–130)
GLUCOSE BLDC GLUCOMTR-MCNC: 55 MG/DL (ref 70–130)
GLUCOSE BLDC GLUCOMTR-MCNC: 59 MG/DL (ref 70–130)
GLUCOSE BLDC GLUCOMTR-MCNC: 64 MG/DL (ref 70–130)
GLUCOSE BLDC GLUCOMTR-MCNC: 70 MG/DL (ref 70–130)
GLUCOSE BLDC GLUCOMTR-MCNC: 72 MG/DL (ref 70–130)
GLUCOSE BLDC GLUCOMTR-MCNC: 72 MG/DL (ref 70–130)
GLUCOSE BLDC GLUCOMTR-MCNC: 81 MG/DL (ref 70–130)
GLUCOSE BLDC GLUCOMTR-MCNC: 93 MG/DL (ref 70–130)
GLUCOSE SERPL-MCNC: 89 MG/DL (ref 65–99)
GLUCOSE UR STRIP-MCNC: NEGATIVE MG/DL
HADV DNA SPEC NAA+PROBE: NOT DETECTED
HBA1C MFR BLD: 7.9 % (ref 4.8–5.6)
HCOV 229E RNA SPEC QL NAA+PROBE: NOT DETECTED
HCOV HKU1 RNA SPEC QL NAA+PROBE: NOT DETECTED
HCOV NL63 RNA SPEC QL NAA+PROBE: NOT DETECTED
HCOV OC43 RNA SPEC QL NAA+PROBE: NOT DETECTED
HCT VFR BLD AUTO: 19.4 % (ref 34–46.6)
HCT VFR BLD AUTO: 22 % (ref 34–46.6)
HCT VFR BLD AUTO: 26.4 % (ref 34–46.6)
HGB BLD-MCNC: 6.2 G/DL (ref 12–15.9)
HGB BLD-MCNC: 6.3 G/DL (ref 12–15.9)
HGB BLD-MCNC: 9.1 G/DL (ref 12–15.9)
HGB UR QL STRIP.AUTO: NEGATIVE
HMPV RNA NPH QL NAA+NON-PROBE: NOT DETECTED
HPIV1 RNA ISLT QL NAA+PROBE: NOT DETECTED
HPIV2 RNA SPEC QL NAA+PROBE: NOT DETECTED
HPIV3 RNA NPH QL NAA+PROBE: NOT DETECTED
HPIV4 P GENE NPH QL NAA+PROBE: NOT DETECTED
HYPOCHROMIA BLD QL: ABNORMAL
INR PPP: 2.08 (ref 0.9–1.1)
KETONES UR QL STRIP: NEGATIVE
LEUKOCYTE ESTERASE UR QL STRIP.AUTO: NEGATIVE
LIPASE SERPL-CCNC: 15 U/L (ref 13–60)
LYMPHOCYTES # BLD MANUAL: 0.45 10*3/MM3 (ref 0.7–3.1)
LYMPHOCYTES NFR BLD MANUAL: 4 % (ref 5–12)
M PNEUMO IGG SER IA-ACNC: NOT DETECTED
MAGNESIUM SERPL-MCNC: 1.6 MG/DL (ref 1.6–2.6)
MCH RBC QN AUTO: 26.2 PG (ref 26.6–33)
MCH RBC QN AUTO: 27.1 PG (ref 26.6–33)
MCHC RBC AUTO-ENTMCNC: 32 G/DL (ref 31.5–35.7)
MCHC RBC AUTO-ENTMCNC: 34.5 G/DL (ref 31.5–35.7)
MCV RBC AUTO: 78.6 FL (ref 79–97)
MCV RBC AUTO: 81.9 FL (ref 79–97)
METHADONE UR QL SCN: NEGATIVE
MONOCYTES # BLD: 0.18 10*3/MM3 (ref 0.1–0.9)
NEUTROPHILS # BLD AUTO: 3.78 10*3/MM3 (ref 1.7–7)
NEUTROPHILS NFR BLD MANUAL: 84.8 % (ref 42.7–76)
NITRITE UR QL STRIP: NEGATIVE
NT-PROBNP SERPL-MCNC: 315 PG/ML (ref 0–450)
OPIATES UR QL: NEGATIVE
OXYCODONE UR QL SCN: NEGATIVE
PH UR STRIP.AUTO: 8.5 [PH] (ref 5–8)
PHOSPHATE SERPL-MCNC: 4.6 MG/DL (ref 2.5–4.5)
PLAT MORPH BLD: NORMAL
PLATELET # BLD AUTO: 45 10*3/MM3 (ref 140–450)
PLATELET # BLD AUTO: 61 10*3/MM3 (ref 140–450)
PMV BLD AUTO: 10 FL (ref 6–12)
PMV BLD AUTO: 10.1 FL (ref 6–12)
POTASSIUM SERPL-SCNC: 3.8 MMOL/L (ref 3.5–5.2)
PROT SERPL-MCNC: 8.4 G/DL (ref 6–8.5)
PROT UR QL STRIP: ABNORMAL
PROTHROMBIN TIME: 23.6 SECONDS (ref 11.7–14.2)
QT INTERVAL: 336 MS
QTC INTERVAL: 481 MS
RBC # BLD AUTO: 2.37 10*6/MM3 (ref 3.77–5.28)
RBC # BLD AUTO: 3.36 10*6/MM3 (ref 3.77–5.28)
RH BLD: POSITIVE
RHINOVIRUS RNA SPEC NAA+PROBE: NOT DETECTED
RSV RNA NPH QL NAA+NON-PROBE: NOT DETECTED
SARS-COV-2 RNA NPH QL NAA+NON-PROBE: NOT DETECTED
SODIUM SERPL-SCNC: 136 MMOL/L (ref 136–145)
SP GR UR STRIP: 1.01 (ref 1–1.03)
T&S EXPIRATION DATE: NORMAL
TARGETS BLD QL SMEAR: ABNORMAL
TROPONIN T DELTA: -7 NG/L
TROPONIN T SERPL HS-MCNC: 32 NG/L
TSH SERPL DL<=0.05 MIU/L-ACNC: 7.01 UIU/ML (ref 0.27–4.2)
UNIT  ABO: NORMAL
UNIT  RH: NORMAL
UROBILINOGEN UR QL STRIP: ABNORMAL
VARIANT LYMPHS NFR BLD MANUAL: 10.1 % (ref 19.6–45.3)
WBC MORPH BLD: NORMAL
WBC NRBC COR # BLD AUTO: 2.85 10*3/MM3 (ref 3.4–10.8)
WBC NRBC COR # BLD AUTO: 4.46 10*3/MM3 (ref 3.4–10.8)

## 2024-11-07 PROCEDURE — P9100 PATHOGEN TEST FOR PLATELETS: HCPCS

## 2024-11-07 PROCEDURE — 86900 BLOOD TYPING SEROLOGIC ABO: CPT

## 2024-11-07 PROCEDURE — 25010000002 THIAMINE HCL 200 MG/2ML SOLUTION: Performed by: HOSPITALIST

## 2024-11-07 PROCEDURE — 85025 COMPLETE CBC W/AUTO DIFF WBC: CPT

## 2024-11-07 PROCEDURE — 99222 1ST HOSP IP/OBS MODERATE 55: CPT | Performed by: INTERNAL MEDICINE

## 2024-11-07 PROCEDURE — P9016 RBC LEUKOCYTES REDUCED: HCPCS

## 2024-11-07 PROCEDURE — 80307 DRUG TEST PRSMV CHEM ANLYZR: CPT | Performed by: EMERGENCY MEDICINE

## 2024-11-07 PROCEDURE — 25010000002 PROPOFOL 10 MG/ML EMULSION: Performed by: STUDENT IN AN ORGANIZED HEALTH CARE EDUCATION/TRAINING PROGRAM

## 2024-11-07 PROCEDURE — P9059 PLASMA, FRZ BETWEEN 8-24HOUR: HCPCS

## 2024-11-07 PROCEDURE — 25810000003 LACTATED RINGERS PER 1000 ML: Performed by: STUDENT IN AN ORGANIZED HEALTH CARE EDUCATION/TRAINING PROGRAM

## 2024-11-07 PROCEDURE — 86927 PLASMA FRESH FROZEN: CPT

## 2024-11-07 PROCEDURE — 81003 URINALYSIS AUTO W/O SCOPE: CPT | Performed by: EMERGENCY MEDICINE

## 2024-11-07 PROCEDURE — 94799 UNLISTED PULMONARY SVC/PX: CPT

## 2024-11-07 PROCEDURE — 25010000002 MIDAZOLAM PER 1 MG: Performed by: EMERGENCY MEDICINE

## 2024-11-07 PROCEDURE — 25010000002 CEFTRIAXONE PER 250 MG: Performed by: EMERGENCY MEDICINE

## 2024-11-07 PROCEDURE — 83036 HEMOGLOBIN GLYCOSYLATED A1C: CPT

## 2024-11-07 PROCEDURE — 99223 1ST HOSP IP/OBS HIGH 75: CPT | Performed by: PSYCHIATRY & NEUROLOGY

## 2024-11-07 PROCEDURE — 85730 THROMBOPLASTIN TIME PARTIAL: CPT

## 2024-11-07 PROCEDURE — 85014 HEMATOCRIT: CPT | Performed by: HOSPITALIST

## 2024-11-07 PROCEDURE — 83605 ASSAY OF LACTIC ACID: CPT | Performed by: EMERGENCY MEDICINE

## 2024-11-07 PROCEDURE — 82948 REAGENT STRIP/BLOOD GLUCOSE: CPT

## 2024-11-07 PROCEDURE — 85007 BL SMEAR W/DIFF WBC COUNT: CPT

## 2024-11-07 PROCEDURE — 94002 VENT MGMT INPAT INIT DAY: CPT

## 2024-11-07 PROCEDURE — 74177 CT ABD & PELVIS W/CONTRAST: CPT

## 2024-11-07 PROCEDURE — 70450 CT HEAD/BRAIN W/O DYE: CPT

## 2024-11-07 PROCEDURE — 25010000002 OCTREOTIDE PER 25 MCG: Performed by: EMERGENCY MEDICINE

## 2024-11-07 PROCEDURE — 85027 COMPLETE CBC AUTOMATED: CPT | Performed by: INTERNAL MEDICINE

## 2024-11-07 PROCEDURE — 31500 INSERT EMERGENCY AIRWAY: CPT | Performed by: STUDENT IN AN ORGANIZED HEALTH CARE EDUCATION/TRAINING PROGRAM

## 2024-11-07 PROCEDURE — 25010000002 LIDOCAINE 2% SOLUTION: Performed by: STUDENT IN AN ORGANIZED HEALTH CARE EDUCATION/TRAINING PROGRAM

## 2024-11-07 PROCEDURE — 25510000001 IOPAMIDOL 61 % SOLUTION: Performed by: EMERGENCY MEDICINE

## 2024-11-07 PROCEDURE — 85018 HEMOGLOBIN: CPT | Performed by: HOSPITALIST

## 2024-11-07 PROCEDURE — 25010000002 CEFTRIAXONE PER 250 MG

## 2024-11-07 PROCEDURE — 85610 PROTHROMBIN TIME: CPT

## 2024-11-07 PROCEDURE — 25810000003 SODIUM CHLORIDE 0.9 % SOLUTION: Performed by: EMERGENCY MEDICINE

## 2024-11-07 PROCEDURE — 06L38CZ OCCLUSION OF ESOPHAGEAL VEIN WITH EXTRALUMINAL DEVICE, VIA NATURAL OR ARTIFICIAL OPENING ENDOSCOPIC: ICD-10-PCS | Performed by: INTERNAL MEDICINE

## 2024-11-07 PROCEDURE — P9035 PLATELET PHERES LEUKOREDUCED: HCPCS

## 2024-11-07 PROCEDURE — 84484 ASSAY OF TROPONIN QUANT: CPT | Performed by: EMERGENCY MEDICINE

## 2024-11-07 PROCEDURE — 36430 TRANSFUSION BLD/BLD COMPNT: CPT

## 2024-11-07 PROCEDURE — 25010000002 SUCCINYLCHOLINE PER 20 MG: Performed by: STUDENT IN AN ORGANIZED HEALTH CARE EDUCATION/TRAINING PROGRAM

## 2024-11-07 PROCEDURE — 25010000002 LORAZEPAM PER 2 MG: Performed by: HOSPITALIST

## 2024-11-07 RX ORDER — DEXTROSE AND SODIUM CHLORIDE 10; .2 G/100ML; G/100ML
50 INJECTION, SOLUTION INTRAVENOUS CONTINUOUS
Status: DISCONTINUED | OUTPATIENT
Start: 2024-11-07 | End: 2024-11-07

## 2024-11-07 RX ORDER — LORAZEPAM 2 MG/ML
1 INJECTION INTRAMUSCULAR
Status: ACTIVE | OUTPATIENT
Start: 2024-11-07 | End: 2024-11-12

## 2024-11-07 RX ORDER — DEXTROSE MONOHYDRATE 100 MG/ML
150 INJECTION, SOLUTION INTRAVENOUS CONTINUOUS
Status: ACTIVE | OUTPATIENT
Start: 2024-11-07 | End: 2024-11-08

## 2024-11-07 RX ORDER — SODIUM CHLORIDE 0.9 % (FLUSH) 0.9 %
10 SYRINGE (ML) INJECTION EVERY 12 HOURS SCHEDULED
Status: CANCELLED | OUTPATIENT
Start: 2024-11-07

## 2024-11-07 RX ORDER — DEXTROSE MONOHYDRATE 25 G/50ML
25 INJECTION, SOLUTION INTRAVENOUS ONCE
Status: COMPLETED | OUTPATIENT
Start: 2024-11-07 | End: 2024-11-07

## 2024-11-07 RX ORDER — IOPAMIDOL 612 MG/ML
100 INJECTION, SOLUTION INTRAVASCULAR
Status: COMPLETED | OUTPATIENT
Start: 2024-11-07 | End: 2024-11-07

## 2024-11-07 RX ORDER — AMOXICILLIN 250 MG
2 CAPSULE ORAL 2 TIMES DAILY
Status: DISCONTINUED | OUTPATIENT
Start: 2024-11-07 | End: 2024-11-13 | Stop reason: HOSPADM

## 2024-11-07 RX ORDER — POLYETHYLENE GLYCOL 3350 17 G/17G
17 POWDER, FOR SOLUTION ORAL DAILY PRN
Status: DISCONTINUED | OUTPATIENT
Start: 2024-11-07 | End: 2024-11-13 | Stop reason: HOSPADM

## 2024-11-07 RX ORDER — DEXTROSE MONOHYDRATE 25 G/50ML
25 INJECTION, SOLUTION INTRAVENOUS AS NEEDED
Status: DISCONTINUED | OUTPATIENT
Start: 2024-11-07 | End: 2024-11-13 | Stop reason: HOSPADM

## 2024-11-07 RX ORDER — LORAZEPAM 2 MG/ML
2 INJECTION INTRAMUSCULAR
Status: DISPENSED | OUTPATIENT
Start: 2024-11-07 | End: 2024-11-12

## 2024-11-07 RX ORDER — BISACODYL 10 MG
10 SUPPOSITORY, RECTAL RECTAL DAILY PRN
Status: DISCONTINUED | OUTPATIENT
Start: 2024-11-07 | End: 2024-11-13 | Stop reason: HOSPADM

## 2024-11-07 RX ORDER — PANTOPRAZOLE SODIUM 40 MG/10ML
40 INJECTION, POWDER, LYOPHILIZED, FOR SOLUTION INTRAVENOUS EVERY 12 HOURS SCHEDULED
Status: DISCONTINUED | OUTPATIENT
Start: 2024-11-07 | End: 2024-11-12

## 2024-11-07 RX ORDER — LIDOCAINE HYDROCHLORIDE 20 MG/ML
INJECTION, SOLUTION INFILTRATION; PERINEURAL AS NEEDED
Status: DISCONTINUED | OUTPATIENT
Start: 2024-11-07 | End: 2024-11-07 | Stop reason: SURG

## 2024-11-07 RX ORDER — LORAZEPAM 1 MG/1
4 TABLET ORAL
Status: ACTIVE | OUTPATIENT
Start: 2024-11-07 | End: 2024-11-12

## 2024-11-07 RX ORDER — LORAZEPAM 2 MG/ML
1 INJECTION INTRAMUSCULAR EVERY 6 HOURS
Status: DISCONTINUED | OUTPATIENT
Start: 2024-11-08 | End: 2024-11-10

## 2024-11-07 RX ORDER — SODIUM CHLORIDE 0.9 % (FLUSH) 0.9 %
20 SYRINGE (ML) INJECTION AS NEEDED
Status: CANCELLED | OUTPATIENT
Start: 2024-11-07

## 2024-11-07 RX ORDER — ONDANSETRON 2 MG/ML
4 INJECTION INTRAMUSCULAR; INTRAVENOUS EVERY 6 HOURS PRN
Status: DISCONTINUED | OUTPATIENT
Start: 2024-11-07 | End: 2024-11-13 | Stop reason: HOSPADM

## 2024-11-07 RX ORDER — DEXTROSE MONOHYDRATE 100 MG/ML
150 INJECTION, SOLUTION INTRAVENOUS CONTINUOUS
Status: ACTIVE | OUTPATIENT
Start: 2024-11-07 | End: 2024-11-07

## 2024-11-07 RX ORDER — SODIUM CHLORIDE 0.9 % (FLUSH) 0.9 %
10 SYRINGE (ML) INJECTION AS NEEDED
Status: CANCELLED | OUTPATIENT
Start: 2024-11-07

## 2024-11-07 RX ORDER — LORAZEPAM 2 MG/ML
4 INJECTION INTRAMUSCULAR
Status: ACTIVE | OUTPATIENT
Start: 2024-11-07 | End: 2024-11-12

## 2024-11-07 RX ORDER — SODIUM CHLORIDE 0.9 % (FLUSH) 0.9 %
10 SYRINGE (ML) INJECTION EVERY 12 HOURS SCHEDULED
Status: DISCONTINUED | OUTPATIENT
Start: 2024-11-07 | End: 2024-11-13 | Stop reason: HOSPADM

## 2024-11-07 RX ORDER — THIAMINE HYDROCHLORIDE 100 MG/ML
200 INJECTION, SOLUTION INTRAMUSCULAR; INTRAVENOUS EVERY 8 HOURS SCHEDULED
Status: COMPLETED | OUTPATIENT
Start: 2024-11-07 | End: 2024-11-11

## 2024-11-07 RX ORDER — BISACODYL 5 MG/1
5 TABLET, DELAYED RELEASE ORAL DAILY PRN
Status: DISCONTINUED | OUTPATIENT
Start: 2024-11-07 | End: 2024-11-13 | Stop reason: HOSPADM

## 2024-11-07 RX ORDER — SUCCINYLCHOLINE CHLORIDE 20 MG/ML
INJECTION INTRAMUSCULAR; INTRAVENOUS AS NEEDED
Status: DISCONTINUED | OUTPATIENT
Start: 2024-11-07 | End: 2024-11-07 | Stop reason: SURG

## 2024-11-07 RX ORDER — NITROGLYCERIN 0.4 MG/1
0.4 TABLET SUBLINGUAL
Status: DISCONTINUED | OUTPATIENT
Start: 2024-11-07 | End: 2024-11-13 | Stop reason: HOSPADM

## 2024-11-07 RX ORDER — DEXTROSE MONOHYDRATE 100 MG/ML
100 INJECTION, SOLUTION INTRAVENOUS CONTINUOUS
Status: DISCONTINUED | OUTPATIENT
Start: 2024-11-07 | End: 2024-11-07

## 2024-11-07 RX ORDER — ACETAMINOPHEN 325 MG/1
650 TABLET ORAL ONCE
Status: DISCONTINUED | OUTPATIENT
Start: 2024-11-07 | End: 2024-11-13 | Stop reason: HOSPADM

## 2024-11-07 RX ORDER — LORAZEPAM 1 MG/1
2 TABLET ORAL
Status: ACTIVE | OUTPATIENT
Start: 2024-11-07 | End: 2024-11-12

## 2024-11-07 RX ORDER — PROPOFOL 10 MG/ML
VIAL (ML) INTRAVENOUS AS NEEDED
Status: DISCONTINUED | OUTPATIENT
Start: 2024-11-07 | End: 2024-11-07 | Stop reason: SURG

## 2024-11-07 RX ORDER — ACETAMINOPHEN 650 MG/1
650 SUPPOSITORY RECTAL ONCE AS NEEDED
Status: COMPLETED | OUTPATIENT
Start: 2024-11-07 | End: 2024-11-07

## 2024-11-07 RX ORDER — DEXTROSE MONOHYDRATE 100 MG/ML
50 INJECTION, SOLUTION INTRAVENOUS CONTINUOUS
Status: DISCONTINUED | OUTPATIENT
Start: 2024-11-07 | End: 2024-11-07

## 2024-11-07 RX ORDER — LORAZEPAM 2 MG/ML
2 INJECTION INTRAMUSCULAR EVERY 6 HOURS
Status: DISPENSED | OUTPATIENT
Start: 2024-11-07 | End: 2024-11-08

## 2024-11-07 RX ORDER — LORAZEPAM 1 MG/1
1 TABLET ORAL
Status: ACTIVE | OUTPATIENT
Start: 2024-11-07 | End: 2024-11-12

## 2024-11-07 RX ORDER — SODIUM CHLORIDE, SODIUM LACTATE, POTASSIUM CHLORIDE, CALCIUM CHLORIDE 600; 310; 30; 20 MG/100ML; MG/100ML; MG/100ML; MG/100ML
INJECTION, SOLUTION INTRAVENOUS CONTINUOUS PRN
Status: DISCONTINUED | OUTPATIENT
Start: 2024-11-07 | End: 2024-11-07 | Stop reason: SURG

## 2024-11-07 RX ADMIN — PROPOFOL 100 MG: 10 INJECTION, EMULSION INTRAVENOUS at 16:15

## 2024-11-07 RX ADMIN — OCTREOTIDE ACETATE 25 MCG/HR: 500 INJECTION, SOLUTION INTRAVENOUS; SUBCUTANEOUS at 21:19

## 2024-11-07 RX ADMIN — ACETAMINOPHEN 650 MG: 650 SUPPOSITORY RECTAL at 06:11

## 2024-11-07 RX ADMIN — DEXTROSE MONOHYDRATE 100 ML/HR: 100 INJECTION, SOLUTION INTRAVENOUS at 02:59

## 2024-11-07 RX ADMIN — PANTOPRAZOLE SODIUM 8 MG/HR: 40 INJECTION, POWDER, FOR SOLUTION INTRAVENOUS at 09:06

## 2024-11-07 RX ADMIN — MUPIROCIN 1 APPLICATION: 20 OINTMENT TOPICAL at 21:19

## 2024-11-07 RX ADMIN — DEXTROSE MONOHYDRATE 25 G: 25 INJECTION, SOLUTION INTRAVENOUS at 19:01

## 2024-11-07 RX ADMIN — DEXTROSE MONOHYDRATE 25 G: 25 INJECTION, SOLUTION INTRAVENOUS at 00:42

## 2024-11-07 RX ADMIN — DEXTROSE MONOHYDRATE 125 ML/HR: 100 INJECTION, SOLUTION INTRAVENOUS at 05:02

## 2024-11-07 RX ADMIN — DEXTROSE MONOHYDRATE 50 ML/HR: 100 INJECTION, SOLUTION INTRAVENOUS at 01:31

## 2024-11-07 RX ADMIN — DEXTROSE MONOHYDRATE 25 G: 25 INJECTION, SOLUTION INTRAVENOUS at 08:16

## 2024-11-07 RX ADMIN — DEXTROSE MONOHYDRATE 25 G: 25 INJECTION, SOLUTION INTRAVENOUS at 02:56

## 2024-11-07 RX ADMIN — LORAZEPAM 2 MG: 2 INJECTION INTRAMUSCULAR; INTRAVENOUS at 03:06

## 2024-11-07 RX ADMIN — SODIUM CHLORIDE 1000 ML: 9 INJECTION, SOLUTION INTRAVENOUS at 01:35

## 2024-11-07 RX ADMIN — MUPIROCIN 1 APPLICATION: 20 OINTMENT TOPICAL at 07:49

## 2024-11-07 RX ADMIN — LORAZEPAM 2 MG: 2 INJECTION INTRAMUSCULAR; INTRAVENOUS at 09:00

## 2024-11-07 RX ADMIN — FOLIC ACID 1 MG: 5 INJECTION, SOLUTION INTRAMUSCULAR; INTRAVENOUS; SUBCUTANEOUS at 09:04

## 2024-11-07 RX ADMIN — LORAZEPAM 2 MG: 2 INJECTION INTRAMUSCULAR; INTRAVENOUS at 22:44

## 2024-11-07 RX ADMIN — THIAMINE HYDROCHLORIDE 200 MG: 100 INJECTION, SOLUTION INTRAMUSCULAR; INTRAVENOUS at 06:08

## 2024-11-07 RX ADMIN — Medication 10 ML: at 05:04

## 2024-11-07 RX ADMIN — PANTOPRAZOLE SODIUM 8 MG/HR: 40 INJECTION, POWDER, FOR SOLUTION INTRAVENOUS at 05:02

## 2024-11-07 RX ADMIN — LIDOCAINE HYDROCHLORIDE 60 MG: 20 INJECTION, SOLUTION INFILTRATION; PERINEURAL at 16:15

## 2024-11-07 RX ADMIN — LORAZEPAM 2 MG: 2 INJECTION INTRAMUSCULAR; INTRAVENOUS at 10:45

## 2024-11-07 RX ADMIN — DEXTROSE MONOHYDRATE 25 G: 25 INJECTION, SOLUTION INTRAVENOUS at 10:28

## 2024-11-07 RX ADMIN — PANTOPRAZOLE SODIUM 8 MG/HR: 40 INJECTION, POWDER, FOR SOLUTION INTRAVENOUS at 15:10

## 2024-11-07 RX ADMIN — Medication 10 ML: at 09:27

## 2024-11-07 RX ADMIN — LORAZEPAM 2 MG: 2 INJECTION INTRAMUSCULAR; INTRAVENOUS at 22:01

## 2024-11-07 RX ADMIN — THIAMINE HYDROCHLORIDE 200 MG: 100 INJECTION, SOLUTION INTRAMUSCULAR; INTRAVENOUS at 17:56

## 2024-11-07 RX ADMIN — CEFTRIAXONE SODIUM 1000 MG: 1 INJECTION, POWDER, FOR SOLUTION INTRAMUSCULAR; INTRAVENOUS at 01:38

## 2024-11-07 RX ADMIN — IOPAMIDOL 85 ML: 612 INJECTION, SOLUTION INTRAVENOUS at 00:25

## 2024-11-07 RX ADMIN — SUCCINYLCHOLINE CHLORIDE 60 MG: 20 INJECTION, SOLUTION INTRAMUSCULAR; INTRAVENOUS; PARENTERAL at 16:15

## 2024-11-07 RX ADMIN — SODIUM CHLORIDE, POTASSIUM CHLORIDE, SODIUM LACTATE AND CALCIUM CHLORIDE: 600; 310; 30; 20 INJECTION, SOLUTION INTRAVENOUS at 16:15

## 2024-11-07 RX ADMIN — DEXTROSE MONOHYDRATE 25 G: 25 INJECTION, SOLUTION INTRAVENOUS at 18:55

## 2024-11-07 RX ADMIN — THIAMINE HYDROCHLORIDE 200 MG: 100 INJECTION, SOLUTION INTRAMUSCULAR; INTRAVENOUS at 21:19

## 2024-11-07 RX ADMIN — LORAZEPAM 2 MG: 2 INJECTION INTRAMUSCULAR; INTRAVENOUS at 18:33

## 2024-11-07 RX ADMIN — LORAZEPAM 2 MG: 2 INJECTION INTRAMUSCULAR; INTRAVENOUS at 08:15

## 2024-11-07 RX ADMIN — CEFTRIAXONE SODIUM 1000 MG: 1 INJECTION, POWDER, FOR SOLUTION INTRAMUSCULAR; INTRAVENOUS at 22:44

## 2024-11-07 RX ADMIN — MIDAZOLAM 1 MG: 1 INJECTION INTRAMUSCULAR; INTRAVENOUS at 00:14

## 2024-11-07 RX ADMIN — DEXTROSE MONOHYDRATE 25 G: 25 INJECTION, SOLUTION INTRAVENOUS at 05:29

## 2024-11-07 RX ADMIN — PROPOFOL 120 MCG/KG/MIN: 10 INJECTION, EMULSION INTRAVENOUS at 16:16

## 2024-11-07 RX ADMIN — LORAZEPAM 2 MG: 2 INJECTION INTRAMUSCULAR; INTRAVENOUS at 13:57

## 2024-11-07 RX ADMIN — OCTREOTIDE ACETATE 25 MCG/HR: 500 INJECTION, SOLUTION INTRAVENOUS; SUBCUTANEOUS at 03:01

## 2024-11-07 RX ADMIN — Medication 10 ML: at 20:53

## 2024-11-07 RX ADMIN — DEXTROSE MONOHYDRATE 150 ML/HR: 100 INJECTION, SOLUTION INTRAVENOUS at 20:52

## 2024-11-07 RX ADMIN — LORAZEPAM 2 MG: 2 INJECTION INTRAMUSCULAR; INTRAVENOUS at 20:51

## 2024-11-07 RX ADMIN — PANTOPRAZOLE SODIUM 40 MG: 40 INJECTION, POWDER, FOR SOLUTION INTRAVENOUS at 20:51

## 2024-11-07 NOTE — ANESTHESIA PROCEDURE NOTES
Airway  Urgency: emergent    Date/Time: 11/7/2024 4:15 PM  Airway not difficult    General Information and Staff    Patient location during procedure: ICU  Anesthesiologist: Milan Mercado MD    Indications and Patient Condition  Indications for airway management: airway protection    Preoxygenated: yes  MILS maintained throughout  Mask difficulty assessment: 0 - not attempted    Final Airway Details  Final airway type: endotracheal airway      Successful airway: ETT  Cuffed: yes   Successful intubation technique: RSI and video laryngoscopy  Endotracheal tube insertion site: oral  Blade: Glidescope  Blade size: 3  ETT size (mm): 7.0  Cormack-Lehane Classification: grade I - full view of glottis  Placement verified by: chest auscultation and capnometry   Inital cuff pressure (cm H2O): 8  Cuff volume (mL): 8  Measured from: teeth  Number of attempts at approach: 1  Assessment: lips, teeth, and gum same as pre-op and atraumatic intubation

## 2024-11-07 NOTE — CONSULTS
[facility], [facility]  Initial Inpatient Consult Note    Referring Provider: Dr. CRISTINA Melgar    Reason for Consultation: Possible upper GI bleed, history of esophageal varices with banding, alcoholic cirrhosis.    Subjective     History of present illness:    39 y.o. female w/ alcoholic cirrhosis (drinks up to 1 gallon of vodka/day, last ETOH 2 days ago) with esophageal varices with bleeding and banding, DM, GERD, chronic pancreatitis, h/o traumatic brain injury in the past (while in the ) who was admitted thru the ER 24 with seizures at home x 2 last night. She has been vomiting dark material x 2-3 days but also has nose bleeding (x 2 weeks). Hgb 6.8 in ER with plats 64, ammonia 88, INR 1.79, glucose 44.  works for [employer]. Nonsmoker. She has lost weight but her weight has gone up most recently. She does c/o of upper abd discomfort. The patient is a poor historian and this history is from her . She has 2 children who live in [place].        She last had an EGD at [facility] on 23 by Dr. ROCKY Richardson that showed nonbleeding grade 1 esophageal varices, moderate portal hypertensive gastropathy, moderated adherent blood and erosions in the gastrric antrum, and congested mucosa in the duodenum. She has never had a colonoscopy.     Past Medical History:  Past Medical History:   Diagnosis Date    ALC (alcoholic liver cirrhosis)     Alcohol abuse with alcohol-induced anxiety disorder     Anxiety     Depression     Disease of thyroid gland     History of anemia     Mild    History of esophageal varices     History of thrombocytopenia     Status post alcohol detoxification      Past Surgical History:  Past Surgical History:   Procedure Laterality Date     SECTION      x2    ENDOSCOPY N/A 2023    Procedure: ESOPHAGOGASTRODUODENOSCOPY;  Surgeon: Yony Richardson MD;  Location: [facility] ENDOSCOPY;  Service: Gastroenterology;  Laterality: N/A;  pre: CIRRHOSIS  post:  VARICES, PORTAL GASTROPATHY, GASTRITIS    TONSILLECTOMY        Social History:   Social History     Tobacco Use    Smoking status: Never    Smokeless tobacco: Not on file   Substance Use Topics    Alcohol use: Yes     Comment: drinks vodka daily up to 1/2 fifth, last drink 10/30/16      Family History:  Family History   Problem Relation Age of Onset    Alcohol abuse Father     Drug abuse Father     Alcohol abuse Maternal Grandfather     Depression Maternal Grandmother     Drug abuse Maternal Grandmother     Alcohol abuse Paternal Grandfather        Home Meds:  (Not in a hospital admission)    Current Meds:   acetaminophen, 650 mg, Oral, Once  cefTRIAXone, 1,000 mg, Intravenous, Q24H  folic acid 1 mg in sodium chloride 0.9 % 50 mL IVPB, 1 mg, Intravenous, Daily  LORazepam, 2 mg, Intravenous, Q6H   Followed by  [START ON 11/8/2024] LORazepam, 1 mg, Intravenous, Q6H  mupirocin, 1 Application, Each Nare, BID  senna-docusate sodium, 2 tablet, Oral, BID  sodium chloride, 10 mL, Intravenous, Q12H  thiamine (B-1) IV, 200 mg, Intravenous, Q8H   Followed by  [START ON 11/12/2024] thiamine, 100 mg, Oral, Daily      Allergies:  No Known Allergies  Review of Systems  Review of systems could not be obtained due to   patient confusion.     Objective     Vital Signs  Temp:  [99.9 °F (37.7 °C)-101.4 °F (38.6 °C)] 101.4 °F (38.6 °C)  Heart Rate:  [] 102  Resp:  [16] 16  BP: (117-136)/(72-87) 118/72  Physical Exam:  General Appearance:    Awake but cannot answer questions.   Head:    Normocephalic, without obvious abnormality, atraumatic   Eyes:            Lids and lashes normal, conjunctivae and sclerae normal, no   icterus   Throat:   No oral lesions, no thrush, oral mucosa moist   Neck:   No adenopathy, supple, trachea midline, no thyromegaly, no   carotid bruit, no JVD   Lungs:     Clear to auscultation,respirations regular, even and                   unlabored    Heart:    Regular rhythm and normal rate, normal S1 and S2,  no            murmur, no gallop, no rub, no click   Chest Wall:    No abnormalities observed   Abdomen:     Normal bowel sounds, no masses, no organomegaly, soft        nontender, nondistended, no guarding, no rebound                 tenderness   Rectal:     Deferred   Extremities:   no edema, no cyanosis, no redness   Skin:   No bleeding, bruising or rash   Lymph nodes:   No palpable adenopathy   Psychiatric:  Awake but cannot answer questions.    Results Review:   I reviewed the patient's new clinical results.    Results from last 7 days   Lab Units 11/06/24  2301   WBC 10*3/mm3 4.53   HEMOGLOBIN g/dL 6.8*   HEMATOCRIT % 20.6*   PLATELETS 10*3/mm3 64*     Results from last 7 days   Lab Units 11/06/24  2301   SODIUM mmol/L 136   POTASSIUM mmol/L 3.8   CHLORIDE mmol/L 100   CO2 mmol/L 23.8   BUN mg/dL 6   CREATININE mg/dL 0.51*   CALCIUM mg/dL 9.0   BILIRUBIN mg/dL 3.7*   ALK PHOS U/L 240*   ALT (SGPT) U/L 21   AST (SGOT) U/L 95*   GLUCOSE mg/dL 89     Results from last 7 days   Lab Units 11/06/24  2301   INR  1.79*     Lab Results   Lab Value Date/Time    LIPASE 15 11/06/2024 2301    LIPASE 43 12/03/2016 0657    LIPASE 51 12/02/2016 0647    LIPASE 62 (H) 12/01/2016 0753    LIPASE 87 (H) 11/30/2016 1612       Radiology:  CT Head Without Contrast   Final Result         Electronically signed by Katie Avila MD on 11-07-24 at 0037      CT Abdomen Pelvis With Contrast   Final Result         Electronically signed by Katie Avila MD on 11-07-24 at 0039      XR Chest 1 View   Final Result         Electronically signed by Katie Avila MD on 11-06-24 at 2340          Assessment & Plan   Assessment:   39 y.o. female w/ alcoholic cirrhosis (drinks up to 1 gallon of vodka/day, last ETOH 2 days ago) with esophageal varices with bleeding and banding, DM, GERD, chronic pancreatitis, h/o traumatic brain injury in the past (while in the ) who was brought to the emergency room with seizures and hematemesis with nosebleeding.  UGI  bleeding vs bleeding from her nose?  MELD 18, Discriminant function 41. She has a fever so will not start steroids for alcoholic hepatitis.   Seizures from ETOH withdrawal?      Plan:   I agree with checking serial H&H's and transfuse as necessary.  Continue with the Protonix drip and the octreotide drips.  Continue ceftriaxone because of upper GI bleeding in a cirrhotic.  We will plan to do an EGD on her later today.  Will discuss this with Dr. Brooks.    I discussed the patient's findings and my recommendations with patient, family, and nursing staff.         Benito Alfred M.D.  [facility]  [address] [place] [address]  Office: [phone]

## 2024-11-07 NOTE — CONSULTS
"Neurology Consult Note    Consult Date: 11/7/2024    Referring MD: Dr. Meglar    Reason for Consult I have been asked to see the patient in neurological consultation to render advice and opinion regarding seizure    Corinna Almonte is a 39 y.o. female with history of alcoholic cirrhosis, diabetes, esophageal varices who presented to the emergency department last night with reported seizures.  Apparently she had 2 episodes at home.  EMS was called.  EMS reported that her initial blood sugar was 40 and she was given D10.  The patient has had ongoing alcohol use but had been sober for 2 days.  Patient was anemic and reportedly had been vomiting coffee-ground emesis for several days but also had some nosebleeding.  In the ED she reportedly vomited about 400 cc of dark material.  She is getting blood and is on Protonix and octreotide drips per GI.  No recurrent seizures since coming to the emergency department.  She has had Ativan several times due to agitation and trying to climb out of the bed.    Past Medical History:   Diagnosis Date    ALC (alcoholic liver cirrhosis)     Alcohol abuse with alcohol-induced anxiety disorder     Anxiety     Depression     Disease of thyroid gland     History of anemia     Mild    History of esophageal varices     History of thrombocytopenia     Status post alcohol detoxification        Exam  /70   Pulse 86   Temp (!) 100.9 °F (38.3 °C)   Resp 16   Ht 162.6 cm (64\")   Wt 59 kg (130 lb)   LMP 10/24/2024 (Approximate)   SpO2 99%   BMI 22.31 kg/m²   Gen: NAD, vitals reviewed  MS: Lethargic but opens eyes intermittently to voice, not following commands  CN: Pupils 3 mm, reactive, conjugate gaze  Motor: Moving all 4 extremities    DATA:    Lab Results   Component Value Date    GLUCOSE 89 11/06/2024    CALCIUM 9.0 11/06/2024     11/06/2024    K 3.8 11/06/2024    CO2 23.8 11/06/2024     11/06/2024    BUN 6 11/06/2024    CREATININE 0.51 (L) 11/06/2024    EGFRIFNONA 134 " 12/03/2016    BCR 11.8 11/06/2024    ANIONGAP 12.2 11/06/2024     Lab Results   Component Value Date    WBC 4.46 11/07/2024    HGB 6.2 (C) 11/07/2024    HCT 19.4 (C) 11/07/2024    MCV 81.9 11/07/2024    PLT 45 (C) 11/07/2024       Lab review: Hemoglobin 6.2, platelets 45, BMP unremarkable    Imaging review: I personally reviewed her CT of the head performed in the emergency department which shows no acute findings. Radiology report reviewed    Diagnoses:  Provoked seizures  Hypoglycemia  Alcohol dependence/abuse in withdrawal  History of alcohol withdrawal seizures  Alcoholic cirrhosis with esophageal varices  Upper GI bleed    Comment: 39-year-old female critically ill with alcoholic cirrhosis, upper GI bleed possibly related to esophageal varices, in the setting of that she has had provoked seizures that are probably multifactorial related to hypoglycemia and cessation of alcohol.  Currently she looks postictal/sedated on Ativan without clinical evidence of ongoing seizure activity, however I would recommend a repeat EEG when she gets up to the ICU.    PLAN:  Routine EEG on ICU admission  Ativan as needed for additional seizures  Seizure precautions  No AEDs given provoked etiology    Discussed with nursing staff and Dr. Taveras    High risk

## 2024-11-07 NOTE — ANESTHESIA POSTPROCEDURE EVALUATION
Patient: Corinna Almonte    Procedure Summary       Date: 11/07/24 Room / Location:  [facility] /  [facility]    Anesthesia Start: 1549 Anesthesia Stop: 1650    Procedure: ESOPHAGOGASTRODUODENOSCOPY AT BEDSIDE with esophageal varices banding (Esophagus) Diagnosis:       History of esophageal varices      Hematemesis, unspecified whether nausea present      (History of esophageal varices [Z87.19])      (Hematemesis, unspecified whether nausea present [K92.0])    Surgeons: Yony Brooks MD Provider: Milan Mercado MD    Anesthesia Type: general ASA Status: 4 - Emergent            Anesthesia Type: general    Vitals  Vitals Value Taken Time   /74 11/07/24 1731   Temp 36.8 °C (98.2 °F) 11/07/24 1552   Pulse 82 11/07/24 1733   Resp 18 11/07/24 1552   SpO2 100 % 11/07/24 1733   Vitals shown include unfiled device data.        Post Anesthesia Care and Evaluation    Patient location during evaluation: bedside  Level of consciousness: awake  Pain management: adequate    Airway patency: patent  Anesthetic complications: No anesthetic complications  PONV Status: controlled  Cardiovascular status: blood pressure returned to baseline and acceptable  Respiratory status: acceptable  Hydration status: acceptable

## 2024-11-07 NOTE — CASE MANAGEMENT/SOCIAL WORK
Continued Stay Note  [facility]     Patient Name: Corinna Almonte  MRN: [id number]  Today's Date: 11/7/2024    Admit Date: 11/6/2024    Plan: Home with spouse   Discharge Plan       Row Name 11/07/24 1536       Plan    Plan Comments [facility]. [facility]. [facility].  calling [facility] to  notify. CCP will follow to see if [facility] has beds [facility] and if patient is stable to transfer. Kathy CARRANZA      Row Name 11/07/24 1404       Plan    Plan Home with spouse    Plan Comments CCP met with patient's spouse at bedside. CCP role explained and discharge planning discussed. Face sheet verified and [facility] Patient lives with her spouse. Patient's bedroom and bathroom are on the main level. Patient has shower chair present in her bathroom. Patient is normally independent with her ADLs. Patient has not used home health or been to SNF. Patient has not been to substance abuse treatment before. Patient's spouse is open to physical rehab or substance abuse rehab if it is needed. CCP will follow for PT eval when appropriate and will need access consult when appropriate. Kathy CARRANZA                   Discharge Codes    No documentation.                 Expected Discharge Date and Time       Expected Discharge Date Expected Discharge Time    Nov 9, 2024               DILLON Magaña

## 2024-11-07 NOTE — ED PROVIDER NOTES
" EMERGENCY DEPARTMENT ENCOUNTER    Room Number:  16/16  PCP: System, Provider Not In  Independent Historians: Patient, Family, and EMS    HPI:  Chief Complaint: had concerns including Hypoglycemia and Seizures.  Vomiting and fever    A complete HPI/ROS/PMH/PSH/SH/FH are unobtainable due to: Poor historian    Chronic or social conditions impacting patient care (Social Determinants of Health): None      Context: Corinna Almonte is a 39 y.o. female with a medical history of TBI, polysubstance use, esophageal varices, diabetes, alcoholic cirrhosis and ongoing alcohol abuse who presents to the ED c/o acute seizure-like episodes at home.  Patient reportedly had 2 seizure-like episodes for which family called EMS.  Patient states ongoing alcohol abuse but cannot tell me when her last drink was.  Patient denies any change in her regular medication regimen but has been ill with nausea and vomiting--she admits that she cannot really tell me much more history due to her TBI.  Patient's initial blood sugar was in the 40s.  Patient given D10 in route with blood sugar improvement.  Pt reportedly more confused and postictal upon EMS arrival with mentation clearing some while on the way here.   reports nosebleed yesterday and no etoh intake X at least 2 days due to n/v and being \"ill\"        Review of prior external notes (non-ED) -and- Review of prior external test results outside of this encounter: Patient's last admission in system was from December 2023.  Patient was admitted with alcohol withdrawal seizure at that time.  [facility]  Patient at that time was consuming approximately half a gallon of liquor a day.  Patient coagulopathic and pancytopenic.  Gastroenterology was consulted due to history of GI bleeding with esophageal varices. She was placed on lactulose,  Xifaxan, PPI therapy , and prednisolone (for alcoholic hepatitis). She had EGD 12/12 which revealed grade 1 esophageal " varices, portal hypertensive gastropathy, gastritis as well as congested duodenal mucosa     Prescription drug monitoring program review:     N/A    PAST MEDICAL HISTORY  Active Ambulatory Problems     Diagnosis Date Noted   • Polysubstance overdose 2016   • Alcohol withdrawal seizure 2023   • Alcoholic hepatitis 2023   • Alcoholic cirrhosis 2023   • Esophageal varices 2023   • Type 2 diabetes mellitus with hyperglycemia 2023   • Anxiety 2023   • Depression 2023   • Severe malnutrition 2023   • Thrombocytopenia 2023   • Anemia 2023   • Coagulopathy 2023     Resolved Ambulatory Problems     Diagnosis Date Noted   • Pancytopenia 2023   • Hypomagnesemia 2023   • Hypokalemia 2023     Past Medical History:   Diagnosis Date   • ALC (alcoholic liver cirrhosis)    • Alcohol abuse with alcohol-induced anxiety disorder    • Disease of thyroid gland    • History of anemia    • History of esophageal varices    • History of thrombocytopenia    • Status post alcohol detoxification          PAST SURGICAL HISTORY  Past Surgical History:   Procedure Laterality Date   •  SECTION      x2   • ENDOSCOPY N/A 2023    Procedure: ESOPHAGOGASTRODUODENOSCOPY;  Surgeon: Yony Richardson MD;  Location: [facility] ENDOSCOPY;  Service: Gastroenterology;  Laterality: N/A;  pre: CIRRHOSIS  post: VARICES, PORTAL GASTROPATHY, GASTRITIS   • TONSILLECTOMY           FAMILY HISTORY  Family History   Problem Relation Age of Onset   • Alcohol abuse Father    • Drug abuse Father    • Alcohol abuse Maternal Grandfather    • Depression Maternal Grandmother    • Drug abuse Maternal Grandmother    • Alcohol abuse Paternal Grandfather          SOCIAL HISTORY  Social History     Socioeconomic History   • Marital status:    Tobacco Use   • Smoking status: Never   Vaping Use   • Vaping status: Never Used   Substance and Sexual Activity   • Alcohol use:  Yes     Comment: drinks vodka daily up to 1/2 fifth, last drink 10/30/16   • Drug use: Defer   • Sexual activity: Defer         ALLERGIES  Patient has no known allergies.        REVIEW OF SYSTEMS  Review of Systems  Included in HPI  All systems reviewed and negative except for those discussed in HPI.      PHYSICAL EXAM    I have reviewed the triage vital signs and nursing notes.    ED Triage Vitals [11/06/24 2245]   Temp Heart Rate Resp BP SpO2   100.3 °F (37.9 °C) 120 16 136/80 98 %      Temp src Heart Rate Source Patient Position BP Location FiO2 (%)   Tympanic Monitor -- -- --       Physical Exam  GENERAL: Ill-appearing cooperative female, alert, tremulous   SKIN: Warm, dry, jaundiced  HENT: Normocephalic, atraumatic, dry mucous membranes, dried blood in corners of mouth, + tongue contusion/abrasion on left, dried blood at right nare  EYES: + scleral icterus,e to, pupils equal and round, no conjunctivitis  CV: regular rhythm, accelerated rate  RESPIRATORY: normal effort, lungs clear, no wheezing  ABDOMEN: soft, diffuse upper abdominal tenderness to palpation, nondistended  MUSCULOSKELETAL: Positive bilateral lower extremity edema  NEURO: alert and oriented to person and place only, moves all extremities, follows commands                                                                   LAB RESULTS  Recent Results (from the past 24 hours)   POC Glucose Once    Collection Time: 11/06/24 10:56 PM    Specimen: Blood   Result Value Ref Range    Glucose 99 70 - 130 mg/dL   Comprehensive Metabolic Panel    Collection Time: 11/06/24 11:01 PM    Specimen: Blood   Result Value Ref Range    Glucose 89 65 - 99 mg/dL    BUN 6 6 - 20 mg/dL    Creatinine 0.51 (L) 0.57 - 1.00 mg/dL    Sodium 136 136 - 145 mmol/L    Potassium 3.8 3.5 - 5.2 mmol/L    Chloride 100 98 - 107 mmol/L    CO2 23.8 22.0 - 29.0 mmol/L    Calcium 9.0 8.6 - 10.5 mg/dL    Total Protein 8.4 6.0 - 8.5 g/dL    Albumin 3.6 3.5 - 5.2 g/dL    ALT (SGPT) 21 1 - 33  U/L    AST (SGOT) 95 (H) 1 - 32 U/L    Alkaline Phosphatase 240 (H) 39 - 117 U/L    Total Bilirubin 3.7 (H) 0.0 - 1.2 mg/dL    Globulin 4.8 gm/dL    A/G Ratio 0.8 g/dL    BUN/Creatinine Ratio 11.8 7.0 - 25.0    Anion Gap 12.2 5.0 - 15.0 mmol/L    eGFR 121.2 >60.0 mL/min/1.73   Protime-INR    Collection Time: 11/06/24 11:01 PM    Specimen: Blood   Result Value Ref Range    Protime 21.0 (H) 11.7 - 14.2 Seconds    INR 1.79 (H) 0.90 - 1.10   aPTT    Collection Time: 11/06/24 11:01 PM    Specimen: Blood   Result Value Ref Range    PTT 41.5 (H) 22.7 - 35.4 seconds   Lipase    Collection Time: 11/06/24 11:01 PM    Specimen: Blood   Result Value Ref Range    Lipase 15 13 - 60 U/L   hCG, Serum, Qualitative    Collection Time: 11/06/24 11:01 PM    Specimen: Blood   Result Value Ref Range    HCG Qualitative Negative Negative   BNP    Collection Time: 11/06/24 11:01 PM    Specimen: Blood   Result Value Ref Range    proBNP 315.0 0.0 - 450.0 pg/mL   High Sensitivity Troponin T    Collection Time: 11/06/24 11:01 PM    Specimen: Blood   Result Value Ref Range    HS Troponin T 32 (H) <14 ng/L   Lactic Acid, Plasma    Collection Time: 11/06/24 11:01 PM    Specimen: Blood   Result Value Ref Range    Lactate 2.8 (C) 0.5 - 2.0 mmol/L   Procalcitonin    Collection Time: 11/06/24 11:01 PM    Specimen: Blood   Result Value Ref Range    Procalcitonin 0.07 0.00 - 0.25 ng/mL   Ethanol    Collection Time: 11/06/24 11:01 PM    Specimen: Blood   Result Value Ref Range    Ethanol <10 0 - 10 mg/dL    Ethanol % <0.010 %   CK    Collection Time: 11/06/24 11:01 PM    Specimen: Blood   Result Value Ref Range    Creatine Kinase 320 (H) 20 - 180 U/L   Magnesium    Collection Time: 11/06/24 11:01 PM    Specimen: Blood   Result Value Ref Range    Magnesium 1.6 1.6 - 2.6 mg/dL   Phosphorus    Collection Time: 11/06/24 11:01 PM    Specimen: Blood   Result Value Ref Range    Phosphorus 4.6 (H) 2.5 - 4.5 mg/dL   TSH    Collection Time: 11/06/24 11:01 PM     Specimen: Blood   Result Value Ref Range    TSH 7.010 (H) 0.270 - 4.200 uIU/mL   Ammonia    Collection Time: 11/06/24 11:01 PM    Specimen: Blood   Result Value Ref Range    Ammonia 88 (H) 11 - 51 umol/L   CBC Auto Differential    Collection Time: 11/06/24 11:01 PM    Specimen: Blood   Result Value Ref Range    WBC 4.53 3.40 - 10.80 10*3/mm3    RBC 2.67 (L) 3.77 - 5.28 10*6/mm3    Hemoglobin 6.8 (C) 12.0 - 15.9 g/dL    Hematocrit 20.6 (C) 34.0 - 46.6 %    MCV 77.2 (L) 79.0 - 97.0 fL    MCH 25.8 (L) 26.6 - 33.0 pg    MCHC 33.5 31.5 - 35.7 g/dL    RDW 15.7 (H) 12.3 - 15.4 %    RDW-SD 42.6 37.0 - 54.0 fl    MPV 9.7 6.0 - 12.0 fL    Platelets 64 (L) 140 - 450 10*3/mm3    Neutrophil % 83.4 (H) 42.7 - 76.0 %    Lymphocyte % 10.8 (L) 19.6 - 45.3 %    Monocyte % 4.4 (L) 5.0 - 12.0 %    Eosinophil % 0.0 (L) 0.3 - 6.2 %    Basophil % 0.7 0.0 - 1.5 %    Immature Grans % 0.7 (H) 0.0 - 0.5 %    Neutrophils, Absolute 3.78 1.70 - 7.00 10*3/mm3    Lymphocytes, Absolute 0.49 (L) 0.70 - 3.10 10*3/mm3    Monocytes, Absolute 0.20 0.10 - 0.90 10*3/mm3    Eosinophils, Absolute 0.00 0.00 - 0.40 10*3/mm3    Basophils, Absolute 0.03 0.00 - 0.20 10*3/mm3    Immature Grans, Absolute 0.03 0.00 - 0.05 10*3/mm3   Beta Hydroxybutyrate Quantitative    Collection Time: 11/06/24 11:01 PM    Specimen: Blood   Result Value Ref Range    Beta-Hydroxybutyrate Quant 0.104 0.020 - 0.270 mmol/L   Respiratory Panel PCR w/COVID-19(SARS-CoV-2) SHALA/ZACK/ROXANNA/PAD/COR/MARTA In-House, NP Swab in UTM/VTM, 2 HR TAT - Swab, Nasopharynx    Collection Time: 11/06/24 11:04 PM    Specimen: Nasopharynx; Swab   Result Value Ref Range    ADENOVIRUS, PCR Not Detected Not Detected    Coronavirus 229E Not Detected Not Detected    Coronavirus HKU1 Not Detected Not Detected    Coronavirus NL63 Not Detected Not Detected    Coronavirus OC43 Not Detected Not Detected    COVID19 Not Detected Not Detected - Ref. Range    Human Metapneumovirus Not Detected Not Detected    Human  Rhinovirus/Enterovirus Not Detected Not Detected    Influenza A PCR Not Detected Not Detected    Influenza B PCR Not Detected Not Detected    Parainfluenza Virus 1 Not Detected Not Detected    Parainfluenza Virus 2 Not Detected Not Detected    Parainfluenza Virus 3 Not Detected Not Detected    Parainfluenza Virus 4 Not Detected Not Detected    RSV, PCR Not Detected Not Detected    Bordetella pertussis pcr Not Detected Not Detected    Bordetella parapertussis PCR Not Detected Not Detected    Chlamydophila pneumoniae PCR Not Detected Not Detected    Mycoplasma pneumo by PCR Not Detected Not Detected   ECG 12 Lead Altered Mental Status    Collection Time: 11/06/24 11:11 PM   Result Value Ref Range    QT Interval 336 ms    QTC Interval 481 ms   Blood Gas, Venous -    Collection Time: 11/06/24 11:17 PM    Specimen: Venous Blood   Result Value Ref Range    pH, Venous 7.614 (C) 7.310 - 7.410 pH Units    pCO2, Venous 22.6 (L) 41.0 - 51.0 mm Hg    pO2, Venous 36.2 35.0 - 45.0 mm Hg    HCO3, Venous 22.9 22.0 - 28.0 mmol/L    Base Excess, Venous 1.7 -2.0 - 2.0 mmol/L    O2 Saturation, Venous 81.8 (H) 45.0 - 75.0 %    CO2 Content 23.6 23 - 27 mmol/L    Barometric Pressure for Blood Gas 751.7000 mmHg    Modality Room Air     Rate 24 Breaths/minute    Device Comment SAT 98%    Type & Screen    Collection Time: 11/06/24 11:52 PM    Specimen: Blood   Result Value Ref Range    ABO Type O     RH type Positive     Antibody Screen Negative     T&S Expiration Date 11/9/2024 11:59:59 PM    Urinalysis With Microscopic If Indicated (No Culture) - Urine, Catheter    Collection Time: 11/07/24 12:10 AM    Specimen: Urine, Catheter   Result Value Ref Range    Color, UA Yellow Yellow, Straw    Appearance, UA Clear Clear    pH, UA 8.5 (H) 5.0 - 8.0    Specific Gravity, UA 1.013 1.005 - 1.030    Glucose, UA Negative Negative    Ketones, UA Negative Negative    Bilirubin, UA Negative Negative    Blood, UA Negative Negative    Protein, UA Trace (A)  Negative    Leuk Esterase, UA Negative Negative    Nitrite, UA Negative Negative    Urobilinogen, UA 1.0 E.U./dL 0.2 - 1.0 E.U./dL   POC Glucose Once    Collection Time: 11/07/24 12:35 AM    Specimen: Blood   Result Value Ref Range    Glucose 44 (C) 70 - 130 mg/dL   Prepare RBC, 1 Units    Collection Time: 11/07/24 12:45 AM   Result Value Ref Range    Product Code O4185Z59     Unit Number [id number]     UNIT  ABO O     UNIT  RH POS     Crossmatch Interpretation Compatible     Dispense Status XM     Blood Expiration Date 202411152359     Blood Type Barcode 5100    Prepare RBC, 1 Units    Collection Time: 11/07/24 12:45 AM   Result Value Ref Range    Product Code T2941T64     Unit Number [id number]     UNIT  ABO O     UNIT  RH POS     Crossmatch Interpretation Compatible     Dispense Status XM     Blood Expiration Date 202412042359     Blood Type Barcode 5100          RADIOLOGY  CT Abdomen Pelvis With Contrast    Result Date: 11/7/2024  Patient: NAKUL EMMANUEL  Time Out: 00:39 Exam(s): CT ABDOMEN + PELVIS With Contrast EXAM:   CT Abdomen and Pelvis With Intravenous Contrast CLINICAL HISTORY:    Reason for exam: vomiting X 2 days. TECHNIQUE:   Axial computed tomography images of the abdomen and pelvis with intravenous contrast.  CTDI is 11.53 mGy and DLP is 634.3 mGy-cm.  This CT exam was performed according to the principle of ALARA (As Low As Reasonably Achievable) by using one or more of the following dose reduction techniques: automated exposure control, adjustment of the mA and or kV according to patient size, and or use of iterative reconstruction technique. COMPARISON:   No relevant prior studies available. FINDINGS:   Lung bases:  Unremarkable.  No mass.  No consolidation.  ABDOMEN:   Liver:  Unremarkable.  No mass.   Gallbladder and bile ducts:  Unremarkable.  No calcified stones.  No ductal dilation.   Pancreas:  Multiple pancreatic calcifications compatible with chronic pancreatitis.  No ductal  dilation.   Spleen:  Enlarged spleen, measuring up to 15.1 cm in AP dimension.   Adrenals:  Unremarkable.  No mass.   Kidneys and ureters:  Punctate nonobstructing calculus at the lower left kidney.   Stomach and bowel:  Unremarkable.  No obstruction.  No mucosal thickening.  PELVIS:   Appendix:  Normal appendix.   Bladder:  Unremarkable.  No mass.   Reproductive:  Unremarkable as visualized.  ABDOMEN and PELVIS:   Intraperitoneal space:  Small abdominal and pelvic ascites is noted.  No free air.   Bones joints:  No acute fracture.  No dislocation.   Soft tissues:  Unremarkable.   Vasculature:  Unremarkable.  No abdominal aortic aneurysm.   Lymph nodes:  Unremarkable.  No enlarged lymph nodes. IMPRESSION:     1.  No acute bowel pathology.  Normal appendix. 2.  Small abdominal and pelvic ascites. 3.  Splenomegaly. 4.  Multiple pancreatic calcifications compatible with chronic pancreatitis.     Electronically signed by Katie Avila MD on 11-07-24 at 0039    CT Head Without Contrast    Result Date: 11/7/2024  Patient: NAKUL EMMANUEL  Time Out: 00:37 Exam(s): CT HEAD Without Contrast EXAM:   CT Head Without Intravenous Contrast CLINICAL HISTORY:    Reason for exam: altered mental status. TECHNIQUE:   Axial computed tomography images of the head brain without intravenous contrast.  CTDI is 55.44 mGy and DLP is 987.3 mGy-cm.  This CT exam was performed according to the principle of ALARA (As Low As Reasonably Achievable) by using one or more of the following dose reduction techniques: automated exposure control, adjustment of the mA and or kV according to patient size, and or use of iterative reconstruction technique. COMPARISON:   No relevant prior studies available. FINDINGS:   Brain:  Mild periventricular white matter hypodensities compatible with chronic ischemic microvascular changes.  No hemorrhage.   Ventricles:  Unremarkable.  No ventriculomegaly.   Bones joints:  Unremarkable.  No acute fracture.   Soft tissues:   Unremarkable.   Sinuses:  Unremarkable as visualized.  No acute sinusitis.   Mastoid air cells:  Unremarkable as visualized.  No mastoid effusion.   Other findings:  Mild generalized atrophy. IMPRESSION:       No acute intracranial pathology.     Electronically signed by Katie Avila MD on 11-07-24 at 0037    XR Chest 1 View    Result Date: 11/6/2024  Patient: NAKUL EMMANUEL  Time Out: 23:40 Exam(s): XR CXR 1 VIEW EXAM:   XR Chest, 1 View CLINICAL HISTORY:    Reason for exam: fever. TECHNIQUE:   Frontal view of the chest. COMPARISON:   No relevant prior studies available. FINDINGS:   Lungs:  Unremarkable.  No consolidation.   Pleural space:  Unremarkable.  No pneumothorax.   Heart:  Unremarkable.  No cardiomegaly.   Mediastinum:  Unremarkable.  Normal mediastinal contour.   Bones joints:  Unremarkable.  No acute fracture. IMPRESSION:       No acute cardiopulmonary process.     Electronically signed by Katie Avila MD on 11-06-24 at 2340       MEDICATIONS GIVEN IN ER  Medications   sodium chloride 0.9 % flush 10 mL (has no administration in time range)   pantoprazole (PROTONIX) injection 80 mg (80 mg Intravenous Given 11/6/24 2332)     And   pantoprazole (PROTONIX) 40 mg in sodium chloride 0.9 % 100 mL (0.4 mg/mL) MBP (8 mg/hr Intravenous New Bag 11/6/24 2335)   octreotide (sandoSTATIN) 500 mcg in sodium chloride 0.9 % 100 mL (5 mcg/mL) infusion (25 mcg/hr Intravenous New Bag 11/6/24 2348)   cefTRIAXone (ROCEPHIN) 1,000 mg in sodium chloride 0.9 % 100 mL MBP (has no administration in time range)   dextrose (D50W) (25 g/50 mL) IV injection 25 g (25 g Intravenous Given 11/7/24 0042)   dextrose 10 % infusion (has no administration in time range)   sodium chloride 0.9 % bolus 1,000 mL (has no administration in time range)   ondansetron (ZOFRAN) injection 4 mg (4 mg Intravenous Given 11/6/24 2329)   octreotide (sandoSTATIN) injection 50 mcg (50 mcg Intravenous Given 11/6/24 2346)   midazolam (VERSED) injection 1 mg (1 mg  Intravenous Given 11/7/24 0014)   iopamidol (ISOVUE-300) 61 % injection 100 mL (85 mL Intravenous Given by Other 11/7/24 0025)         ORDERS PLACED DURING THIS VISIT:  Orders Placed This Encounter   Procedures   • Blood Culture - Blood,   • Blood Culture - Blood,   • Respiratory Panel PCR w/COVID-19(SARS-CoV-2) [facility]ACK/ROXANNA/[facility] In-House, NP Swab in UTM/VTM, 2 HR TAT - Swab, Nasopharynx   • XR Chest 1 View   • CT Head Without Contrast   • CT Abdomen Pelvis With Contrast   • Comprehensive Metabolic Panel   • Protime-INR   • aPTT   • Lipase   • hCG, Serum, Qualitative   • Urinalysis With Microscopic If Indicated (No Culture) - Urine, Catheter   • BNP   • High Sensitivity Troponin T   • Lactic Acid, Plasma   • Procalcitonin   • Ethanol   • Urine Drug Screen - Urine, Clean Catch   • CK   • Magnesium   • Phosphorus   • TSH   • Ammonia   • Blood Gas, Venous -   • CBC Auto Differential   • Beta Hydroxybutyrate Quantitative   • STAT Lactic Acid, Reflex   • High Sensitivity Troponin T 2Hr   • Verify Informed Consent   • Verify Informed Consent   • Re-check blood glucose in 15 minutes and repeat treatment if blood glucose is less than 70 mg/dL   • Re-check blood glucose in 15 minutes.   If blood glucose still less than 70 mg/dL, notify MD for further treatment   • Consider adding IVF with D5 for maintenance   • Pulmonology (on-call MD unless specified)   • POC Glucose Once   • POC Glucose Finger Q1H   • POC Glucose Once   • ECG 12 Lead Altered Mental Status   • Type & Screen   • Prepare RBC, 1 Units   • Prepare RBC, 1 Units   • Insert Peripheral IV   • Insert 2nd peripheral IV   • Inpatient Admission   • CBC & Differential   • Ketone Bodies, Serum (Not performed at [place])         OUTPATIENT MEDICATION MANAGEMENT:  Current Facility-Administered Medications Ordered in Epic   Medication Dose Route Frequency Provider Last Rate Last Admin   • cefTRIAXone (ROCEPHIN) 1,000 mg in sodium chloride 0.9 % 100 mL MBP  1,000 mg  Intravenous Once Mel Gentile MD       • dextrose (D50W) (25 g/50 mL) IV injection 25 g  25 g Intravenous PRN Mel Gentile MD   25 g at 11/07/24 0042   • dextrose 10 % infusion  50 mL/hr Intravenous Continuous Mel Gentile MD       • octreotide (sandoSTATIN) 500 mcg in sodium chloride 0.9 % 100 mL (5 mcg/mL) infusion  25 mcg/hr Intravenous Continuous Mel Gentile MD 5 mL/hr at 11/06/24 2348 25 mcg/hr at 11/06/24 2348   • pantoprazole (PROTONIX) 40 mg in sodium chloride 0.9 % 100 mL (0.4 mg/mL) MBP  8 mg/hr Intravenous Continuous Mel Gentile MD 20 mL/hr at 11/06/24 2335 8 mg/hr at 11/06/24 2335   • sodium chloride 0.9 % bolus 1,000 mL  1,000 mL Intravenous Once Mel Gentile MD       • sodium chloride 0.9 % flush 10 mL  10 mL Intravenous PRN Mel Gentile MD         Current Outpatient Medications Ordered in Epic   Medication Sig Dispense Refill   • folic acid (FOLVITE) 1 MG tablet Take 1 tablet by mouth Daily.     • insulin glargine (LANTUS, SEMGLEE) 100 UNIT/ML injection Inject 30 Units under the skin into the appropriate area as directed Every Night.     • Insulin Lispro (humaLOG) 100 UNIT/ML injection Inject 10 Units under the skin into the appropriate area as directed 3 (Three) Times a Day Before Meals.     • lactulose (CHRONULAC) 10 GM/15ML solution Take 45 mL by mouth 2 (Two) Times a Day. 2700 mL 0   • levothyroxine (SYNTHROID, LEVOTHROID) 100 MCG tablet Take  by mouth Daily.     • pantoprazole (PROTONIX) 40 MG EC tablet Take 1 tablet by mouth Daily. 30 tablet 0   • thiamine (VITAMIN B1) 100 MG tablet Take 1 tablet by mouth Daily.           PROCEDURES  Procedures      Critical care provider statement:    Critical care time (minutes): 36.   Critical care time was exclusive of:  Separately billable procedures and treating other patients   Critical care was necessary to treat or prevent imminent or life-threatening deterioration of the following  conditions:  Circulatory Failure, CNS Failure, and Metabolic Failure   Critical care was time spent personally by me on the following activities:  Development of treatment plan with patient or surrogate, discussions with consultants, evaluation of patient's response to treatment, examination of patient, obtaining history from patient or surrogate, ordering and performing treatments and interventions, ordering and review of laboratory studies, ordering and review of radiographic studies, pulse oximetry, re-evaluation of patient's condition and review of old charts. Critical Care indicators: Acidosis, Dehydration with significant metabolic / chemistry changes, and Rapid heart rate requiring IV therapies and/or close monitoring in ED Acute blood loss anemia with prbc's ordered.      PROGRESS, DATA ANALYSIS, CONSULTS, AND MEDICAL DECISION MAKING  All labs have been independently interpreted by me.  All radiology studies have been reviewed by me. All EKG's have been independently viewed and interpreted by me.  Discussion below represents my analysis of pertinent findings related to patient's condition, differential diagnosis, treatment plan and final disposition.    Differential diagnosis includes but is not limited to   This patient presents with altered mental status.  The differential is broad and includes metabolic disturbances (hyper/hyponatremia, hypercalcemia, hypo/hyperglycemia, renal failure with uremia), infection/sepsis, alcohol intoxication or withdrawal, hypoxia/hypercapnia, hepatic encephalopathy, hyper/hypothyroidism, adrenal insufficiency, seizure, trauma, ICH, CVA.  Given the broad differential, plan to monitor patient and obtain EKG, accucheck, serum labs to include electrolytes, cbc, lfts, troponin, alcohol level, urine/urine tox, CT head, and chest xray. Also may include ABG, LP, and antibiotic administration but will depend on vital signs, initial test results (anion gap etc), and clinical course.      Patient also here with some concerning symptoms for upper GI bleeding.  Patient with history of esophageal  varices with GI bleeding in past with h/o etoh abuse.  Confounding factors include nosebleed yesterday and bit tongue today with seizure. Plan to check labs to evaluate the extent of bleeding, including H/H. Will initiate treatment with PPI and octreotide and rocephin.Plan: labs, LFTs, close hemodynamic monitoring, serial reassessment, PPI therapy, andOctrotide/CTX       Clinical Scores:                  This patient´s Farwell-Blatchford Bleeding (GBS) score: 10.   The patient gets points for:   BUN (<18.2 = 0, 18.2-22.3 = +2, 22.4-28 = +3, 28-70 = +4, >70 = +6)  Hemoglobin (for men >13 = 0, 12-13 = +1, 10-12 = +3, <10 = +6 and for women >12 = 0, 10-12 = +1, <10 = +6)  SBP (> or = to 110 zero, 100-109 = +1,  90-99 = +2, <90 = +3)  Pulse > or = to 100 gets +1  Melena present = +1  Presents with syncope = +2  Liver disease history = +2  Heart failure present = +2  A low risk score is 0.  Any score higher than 0 is high risk for needing intervention like transfusion, endoscopy, or surgery.            ED Course as of 11/07/24 0256   Wed Nov 06, 2024   2312 Blood sugar recheck 99 [AR]   2336 EKG ER MD interpretation   Time: 23: 11  Rhythm and rate: Sinus tachycardia at a rate of 123  Axis: Normal  P waves: Normal  QRS complexes: Normal  ST segments: no elevation nor depressions  T waves: no flattening or inversions  Comparison EKG is from November 2016 where patient was also tachycardic 120s [AR]   2337 I viewed patient's chest x-ray and on my interpretation patient has normal heart size and no pulmonary consolidation or pleural effusions [AR]   Thu Nov 07, 2024   0010 Pt with hemoglobin 6.8--plan cross for 2 units and transfuse 1 unit [AR]   0038 Repeat accucheck 44. Plan 1/2 amp D50 and initiation of D10 fluids at 50cc/hr given that patient is gi bleed and npo. [AR]   0118 I discussed all results with patient  and .  Patient is awake and conversant.  Currently she has D10 running at 50 cc/h.  I discussed need for transfusion and close monitoring in the ICU setting.  Patient and  amenable to that plan.  Awaiting pulmonology callback [AR]   0129 I discussed patient's case with Anabela vásquez on critical care medicine.  Patient accepted to the ICU under Dr. Vidal [AR]   0255 Patient's blood sugar dropped again despite being on D10 at 50 cc/h.  Patient not yet moved to ICU.  Another amp of D50 ordered for patient and rate of D10 increased to 100 cc/h. [AR]      ED Course User Index  [AR] Mel Gentile MD             AS OF 01:30 EST VITALS:    BP - 135/87  HR - 108  TEMP - 99.9 °F (37.7 °C) (Oral)  O2 SATS - 94%      COMPLEXITY OF CARE  The patient requires admission.    DIAGNOSIS  Final diagnoses:   Acute upper GI bleeding   Epistaxis   Seizure   Hypoglycemia   Alcohol abuse with withdrawal   Acute blood loss anemia   Hepatic encephalopathy   Alcoholic cirrhosis of liver without ascites   History of esophageal varices   Abrasion of tongue, initial encounter         DISPOSITION  ED Disposition       ED Disposition   Decision to Admit    Condition   --    Comment   Level of Care: Critical Care [6]   Diagnosis: Alcohol abuse with withdrawal [[id number]]   Admitting Physician: RICKI VIDAL [[id number]]   Attending Physician: RICKI VIDAL [[id number]]   Certification: I Certify That Inpatient Hospital Services Are Medically Necessary For Greater Than 2 Midnights                  Please note that portions of this document were completed with a voice recognition program.    Note Disclaimer: At [facility], we believe that sharing information builds trust and better relationships. You are receiving this note because you recently visited [facility]. It is possible you will see health information before a provider has talked with you about it. This kind of information can be easy to misunderstand. To help  you fully understand what it means for your health, we urge you to discuss this note with your provider.         Mel Gentile MD  11/07/24 0130       Mel Gentile MD  11/07/24 0256

## 2024-11-07 NOTE — ED NOTES
Nursing report ED to floor  Corinna Almonte  39 y.o.  female    HPI :  HPI  Stated Reason for Visit: Two seizures today with no history. Last was 45 mins ago. Known diatbetic. Believes she is type 1. FSBG 45.  History Obtained From: EMS    Chief Complaint  Chief Complaint   Patient presents with    Hypoglycemia    Seizures       Admitting doctor:   Luis A Melgar MD    Admitting diagnosis:   The primary encounter diagnosis was Acute upper GI bleeding. Diagnoses of Epistaxis, Seizure, Hypoglycemia, Alcohol abuse with withdrawal, Acute blood loss anemia, Hepatic encephalopathy, Alcoholic cirrhosis of liver without ascites, History of esophageal varices, Abrasion of tongue, initial encounter, and Hematemesis, unspecified whether nausea present were also pertinent to this visit.    Code status:   Current Code Status       Date Active Code Status Order ID Comments User Context       Prior            Allergies:   Patient has no known allergies.    Isolation:   No active isolations    Intake and Output    Intake/Output Summary (Last 24 hours) at 11/7/2024 1033  Last data filed at 11/7/2024 0951  Gross per 24 hour   Intake 2555 ml   Output 1300 ml   Net 1255 ml       Weight:       11/06/24  2245   Weight: 59 kg (130 lb)       Most recent vitals:   Vitals:    11/07/24 0801 11/07/24 0833 11/07/24 0843 11/07/24 0945   BP: 123/86 116/72 115/70 118/82   BP Location:       Patient Position:       Pulse: 106 91 86 105   Resp: 18 16 16 18   Temp: (!) 101 °F (38.3 °C) (!) 100.8 °F (38.2 °C) (!) 100.9 °F (38.3 °C) 98.9 °F (37.2 °C)   TempSrc:       SpO2: 99% 97% 99% 99%   Weight:       Height:           Active LDAs/IV Access:   Lines, Drains & Airways       Active LDAs       Name Placement date Placement time Site Days    Peripheral IV 12/08/23 0755 Posterior;Right Wrist 12/08/23  0755  Wrist  335    Peripheral IV 12/08/23 0756 Anterior;Right Forearm 12/08/23  0756  Forearm  335    Peripheral IV 11/06/24 2241 Right Antecubital  11/06/24  2241  Antecubital  less than 1    Peripheral IV 11/06/24 2322 Anterior;Left Forearm 11/06/24  2322  Forearm  less than 1    Peripheral IV 11/07/24 0125 Anterior;Left Forearm 11/07/24  0125  Forearm  less than 1    External Urinary Catheter 11/07/24  0009  --  less than 1                    Labs (abnormal labs have a star):   Labs Reviewed   COMPREHENSIVE METABOLIC PANEL - Abnormal; Notable for the following components:       Result Value    Creatinine 0.51 (*)     AST (SGOT) 95 (*)     Alkaline Phosphatase 240 (*)     Total Bilirubin 3.7 (*)     All other components within normal limits    Narrative:     GFR Normal >60  Chronic Kidney Disease <60  Kidney Failure <15     PROTIME-INR - Abnormal; Notable for the following components:    Protime 21.0 (*)     INR 1.79 (*)     All other components within normal limits   APTT - Abnormal; Notable for the following components:    PTT 41.5 (*)     All other components within normal limits   URINALYSIS W/ MICROSCOPIC IF INDICATED (NO CULTURE) - Abnormal; Notable for the following components:    pH, UA 8.5 (*)     Protein, UA Trace (*)     All other components within normal limits    Narrative:     Urine microscopic not indicated.   TROPONIN - Abnormal; Notable for the following components:    HS Troponin T 32 (*)     All other components within normal limits    Narrative:     High Sensitive Troponin T Reference Range:  <14.0 ng/L- Negative Female for AMI  <22.0 ng/L- Negative Male for AMI  >=14 - Abnormal Female indicating possible myocardial injury.  >=22 - Abnormal Male indicating possible myocardial injury.   Clinicians would have to utilize clinical acumen, EKG, Troponin, and serial changes to determine if it is an Acute Myocardial Infarction or myocardial injury due to an underlying chronic condition.        LACTIC ACID, PLASMA - Abnormal; Notable for the following components:    Lactate 2.8 (*)     All other components within normal limits   CK - Abnormal;  Notable for the following components:    Creatine Kinase 320 (*)     All other components within normal limits   PHOSPHORUS - Abnormal; Notable for the following components:    Phosphorus 4.6 (*)     All other components within normal limits   TSH - Abnormal; Notable for the following components:    TSH 7.010 (*)     All other components within normal limits   AMMONIA - Abnormal; Notable for the following components:    Ammonia 88 (*)     All other components within normal limits   BLOOD GAS, VENOUS - Abnormal; Notable for the following components:    pH, Venous 7.614 (*)     pCO2, Venous 22.6 (*)     O2 Saturation, Venous 81.8 (*)     All other components within normal limits   CBC WITH AUTO DIFFERENTIAL - Abnormal; Notable for the following components:    RBC 2.67 (*)     Hemoglobin 6.8 (*)     Hematocrit 20.6 (*)     MCV 77.2 (*)     MCH 25.8 (*)     RDW 15.7 (*)     Platelets 64 (*)     Neutrophil % 83.4 (*)     Lymphocyte % 10.8 (*)     Monocyte % 4.4 (*)     Eosinophil % 0.0 (*)     Immature Grans % 0.7 (*)     Lymphocytes, Absolute 0.49 (*)     All other components within normal limits   LACTIC ACID, REFLEX - Abnormal; Notable for the following components:    Lactate 2.5 (*)     All other components within normal limits   HIGH SENSITIVITIY TROPONIN T 2HR - Abnormal; Notable for the following components:    HS Troponin T 25 (*)     Troponin T Delta -7 (*)     All other components within normal limits    Narrative:     High Sensitive Troponin T Reference Range:  <14.0 ng/L- Negative Female for AMI  <22.0 ng/L- Negative Male for AMI  >=14 - Abnormal Female indicating possible myocardial injury.  >=22 - Abnormal Male indicating possible myocardial injury.   Clinicians would have to utilize clinical acumen, EKG, Troponin, and serial changes to determine if it is an Acute Myocardial Infarction or myocardial injury due to an underlying chronic condition.        CBC WITH AUTO DIFFERENTIAL - Abnormal; Notable for the  following components:    RBC 2.37 (*)     Hemoglobin 6.2 (*)     Hematocrit 19.4 (*)     MCH 26.2 (*)     Platelets 45 (*)     All other components within normal limits   HEMOGLOBIN A1C - Abnormal; Notable for the following components:    Hemoglobin A1C 7.90 (*)     All other components within normal limits    Narrative:     Hemoglobin A1C Ranges:    Increased Risk for Diabetes  5.7% to 6.4%  Diabetes                     >= 6.5%  Diabetic Goal                < 7.0%   PROTIME-INR - Abnormal; Notable for the following components:    Protime 23.6 (*)     INR 2.08 (*)     All other components within normal limits   APTT - Abnormal; Notable for the following components:    PTT 42.8 (*)     All other components within normal limits   FIBRINOGEN - Abnormal; Notable for the following components:    Fibrinogen 165 (*)     All other components within normal limits   AMYLASE - Abnormal; Notable for the following components:    Amylase 588 (*)     All other components within normal limits   LACTIC ACID, REFLEX - Abnormal; Notable for the following components:    Lactate 2.2 (*)     All other components within normal limits   MANUAL DIFFERENTIAL - Abnormal; Notable for the following components:    Neutrophil % 84.8 (*)     Lymphocyte % 10.1 (*)     Monocyte % 4.0 (*)     Eosinophil % 0.0 (*)     Lymphocytes Absolute 0.45 (*)     All other components within normal limits   POCT GLUCOSE FINGERSTICK - Abnormal; Notable for the following components:    Glucose 44 (*)     All other components within normal limits   POCT GLUCOSE FINGERSTICK - Abnormal; Notable for the following components:    Glucose 37 (*)     All other components within normal limits   POCT GLUCOSE FINGERSTICK - Abnormal; Notable for the following components:    Glucose 140 (*)     All other components within normal limits   POCT GLUCOSE FINGERSTICK - Abnormal; Notable for the following components:    Glucose 64 (*)     All other components within normal limits    POCT GLUCOSE FINGERSTICK - Abnormal; Notable for the following components:    Glucose 55 (*)     All other components within normal limits   POCT GLUCOSE FINGERSTICK - Abnormal; Notable for the following components:    Glucose 59 (*)     All other components within normal limits   RESPIRATORY PANEL PCR W/ COVID-19 (SARS-COV-2), NP SWAB IN UTM/VTP, 2 HR TAT - Normal    Narrative:     In the setting of a positive respiratory panel with a viral infection PLUS a negative procalcitonin without other underlying concern for bacterial infection, consider observing off antibiotics or discontinuation of antibiotics and continue supportive care. If the respiratory panel is positive for atypical bacterial infection (Bordetella pertussis, Chlamydophila pneumoniae, or Mycoplasma pneumoniae), consider antibiotic de-escalation to target atypical bacterial infection.   LIPASE - Normal   HCG, SERUM, QUALITATIVE - Normal   BNP (IN-HOUSE) - Normal    Narrative:     This assay is used as an aid in the diagnosis of individuals suspected of having heart failure. It can be used as an aid in the diagnosis of acute decompensated heart failure (ADHF) in patients presenting with signs and symptoms of ADHF to the emergency department (ED). In addition, NT-proBNP of <300 pg/mL indicates ADHF is not likely.    Age Range Result Interpretation  NT-proBNP Concentration (pg/mL:      <50             Positive            >450                   Gray                 300-450                    Negative             <300    50-75           Positive            >900                  Gray                300-900                  Negative            <300      >75             Positive            >1800                  Gray                300-1800                  Negative            <300   PROCALCITONIN - Normal    Narrative:     As a Marker for Sepsis (Non-Neonates):    1. <0.5 ng/mL represents a low risk of severe sepsis and/or septic shock.  2. >2 ng/mL  "represents a high risk of severe sepsis and/or septic shock.    As a Marker for Lower Respiratory Tract Infections that require antibiotic therapy:    PCT on Admission    Antibiotic Therapy       6-12 Hrs later    >0.5                Strongly Recommended  >0.25 - <0.5        Recommended   0.1 - 0.25          Discouraged              Remeasure/reassess PCT  <0.1                Strongly Discouraged     Remeasure/reassess PCT    As 28 day mortality risk marker: \"Change in Procalcitonin Result\" (>80% or <=80%) if Day 0 (or Day 1) and Day 4 values are available. Refer to http://www.[facility]-pct-calculator.com    Change in PCT <=80%  A decrease of PCT levels below or equal to 80% defines a positive change in PCT test result representing a higher risk for 28-day all-cause mortality of patients diagnosed with severe sepsis for septic shock.    Change in PCT >80%  A decrease of PCT levels of more than 80% defines a negative change in PCT result representing a lower risk for 28-day all-cause mortality of patients diagnosed with severe sepsis or septic shock.      URINE DRUG SCREEN - Normal    Narrative:     Negative Thresholds Per Drugs Screened:    Amphetamines                 500 ng/ml  Barbiturates                 200 ng/ml  Benzodiazepines              100 ng/ml  Cocaine                      300 ng/ml  Methadone                    300 ng/ml  Opiates                      300 ng/ml  Oxycodone                    100 ng/ml  THC                           50 ng/ml  Fentanyl                       5 ng/ml      The Normal Value for all drugs tested is negative. This report includes final unconfirmed screening results to be used for medical treatment purposes only. Unconfirmed results must not be used for non-medical purposes such as employment or legal testing. Clinical consideration should be applied to any drug of abuse test, particularly when unconfirmed results are used.           MAGNESIUM - Normal   BETA HYDROXYBUTYRATE " QUANTITATIVE - Normal    Narrative:     In the assessment of possible diabetic ketoacidosis, the test should be interpreted along with other clinical and laboratory findings.  A level greater than 1 mmol/L should require further evaluation and levels of more than 3 mmol/L require immediate medical review.   ACETAMINOPHEN LEVEL - Normal   POCT GLUCOSE FINGERSTICK - Normal   POCT GLUCOSE FINGERSTICK - Normal   POCT GLUCOSE FINGERSTICK - Normal   POCT GLUCOSE FINGERSTICK - Normal   POCT GLUCOSE FINGERSTICK - Normal   POCT GLUCOSE FINGERSTICK - Normal   POCT GLUCOSE FINGERSTICK - Normal   POCT GLUCOSE FINGERSTICK - Normal   BLOOD CULTURE   BLOOD CULTURE   ETHANOL   HEMOGLOBIN AND HEMATOCRIT, BLOOD   LACTIC ACID, REFLEX   HEMOGLOBIN AND HEMATOCRIT, BLOOD   POCT GLUCOSE FINGERSTICK   POCT GLUCOSE FINGERSTICK   POCT GLUCOSE FINGERSTICK   POCT GLUCOSE FINGERSTICK   POCT GLUCOSE FINGERSTICK   TYPE AND SCREEN   PREPARE RBC   PREPARE RBC   PREPARE RBC   PREPARE PLATELET PHERESIS   PREPARE RBC   PREPARE FRESH FROZEN PLASMA   CBC AND DIFFERENTIAL    Narrative:     The following orders were created for panel order CBC & Differential.  Procedure                               Abnormality         Status                     ---------                               -----------         ------                     CBC Auto Differential[[id number]]        Abnormal            Final result                 Please view results for these tests on the individual orders.   KETONE BODIES SERUM    Narrative:     The following orders were created for panel order Ketone Bodies, Serum (Not performed at [place]).  Procedure                               Abnormality         Status                     ---------                               -----------         ------                     Beta Hydroxybutyrate Colby...[[id number]]  Normal              Final result                 Please view results for these tests on the individual orders.   CBC AND  DIFFERENTIAL    Narrative:     The following orders were created for panel order CBC & Differential.  Procedure                               Abnormality         Status                     ---------                               -----------         ------                     CBC Auto Differential[[id number]]        Abnormal            Final result                 Please view results for these tests on the individual orders.       EKG:   ECG 12 Lead Altered Mental Status   Final Result   HEART AHKH=691  bpm   RR Tlqyqktj=581  ms   MA Pldvtrzw=424  ms   P Horizontal Axis=3  deg   P Front Axis=83  deg   QRSD Interval=86  ms   QT Ifpdyzzf=636  ms   OHaL=459  ms   QRS Axis=72  deg   T Wave Axis=34  deg   - OTHERWISE NORMAL ECG -   Sinus tachycardia   When compared with ECG of 30-Nov-2016 16:10:45,   No significant change   Electronically Signed By: Yony Clemente ([facility]) 2024-11-07 07:50:16   Date and Time of Study:2024-11-06 23:11:35          Meds given in ED:   Medications   sodium chloride 0.9 % flush 10 mL (has no administration in time range)   pantoprazole (PROTONIX) injection 80 mg (80 mg Intravenous Given 11/6/24 2332)     And   pantoprazole (PROTONIX) 40 mg in sodium chloride 0.9 % 100 mL (0.4 mg/mL) MBP (8 mg/hr Intravenous New Bag 11/7/24 0906)   octreotide (sandoSTATIN) 500 mcg in sodium chloride 0.9 % 100 mL (5 mcg/mL) infusion (25 mcg/hr Intravenous Currently Infusing 11/7/24 1032)   dextrose (D50W) (25 g/50 mL) IV injection 25 g (25 g Intravenous Given 11/7/24 1028)   nitroglycerin (NITROSTAT) SL tablet 0.4 mg (has no administration in time range)   sodium chloride 0.9 % flush 10 mL (10 mL Intravenous Given 11/7/24 0927)   mupirocin (BACTROBAN) 2 % nasal ointment 1 Application (1 Application Each Nare Not Given 11/7/24 0847)   sennosides-docusate (PERICOLACE) 8.6-50 MG per tablet 2 tablet (2 tablets Oral Not Given 11/7/24 0848)     And   polyethylene glycol (MIRALAX) packet 17 g (has no administration in  time range)     And   bisacodyl (DULCOLAX) EC tablet 5 mg (has no administration in time range)     And   bisacodyl (DULCOLAX) suppository 10 mg (has no administration in time range)   ondansetron (ZOFRAN) injection 4 mg (has no administration in time range)   cefTRIAXone (ROCEPHIN) 1,000 mg in sodium chloride 0.9 % 100 mL MBP (has no administration in time range)   Magnesium Standard Dose Replacement - Follow Nurse / BPA Driven Protocol (has no administration in time range)   thiamine (B-1) injection 200 mg (200 mg Intravenous Given 11/7/24 0608)     Followed by   thiamine (VITAMIN B-1) tablet 100 mg (has no administration in time range)   folic acid 1 mg in sodium chloride 0.9 % 50 mL IVPB (0 mg Intravenous Stopped 11/7/24 0934)   LORazepam (ATIVAN) injection 2 mg (2 mg Intravenous Not Given 11/7/24 0823)     Followed by   LORazepam (ATIVAN) injection 1 mg (has no administration in time range)   LORazepam (ATIVAN) tablet 1 mg ( Oral Not Given:  See Alt 11/7/24 0815)     Or   LORazepam (ATIVAN) injection 1 mg ( Intravenous Not Given:  See Alt 11/7/24 0815)     Or   LORazepam (ATIVAN) tablet 2 mg ( Oral Not Given:  See Alt 11/7/24 0815)     Or   LORazepam (ATIVAN) injection 2 mg ( Intravenous Not Given:  See Alt 11/7/24 0815)     Or   LORazepam (ATIVAN) injection 2 mg (2 mg Intravenous Given 11/7/24 0815)     Or   LORazepam (ATIVAN) injection 2 mg ( Intramuscular Not Given:  See Alt 11/7/24 0815)     Or   LORazepam (ATIVAN) tablet 4 mg ( Oral Not Given:  See Alt 11/7/24 0815)     Or   LORazepam (ATIVAN) injection 4 mg ( Intravenous Not Given:  See Alt 11/7/24 0815)   acetaminophen (TYLENOL) tablet 650 mg (650 mg Oral Not Given 11/7/24 0540)   dextrose 10 % infusion (150 mL/hr Intravenous Currently Infusing 11/7/24 5123)   ondansetron (ZOFRAN) injection 4 mg (4 mg Intravenous Given 11/6/24 6648)   octreotide (sandoSTATIN) injection 50 mcg (50 mcg Intravenous Given 11/6/24 5177)   cefTRIAXone (ROCEPHIN) 1,000 mg in  sodium chloride 0.9 % 100 mL MBP (0 mg Intravenous Stopped 11/7/24 0204)   midazolam (VERSED) injection 1 mg (1 mg Intravenous Given 11/7/24 0014)   iopamidol (ISOVUE-300) 61 % injection 100 mL (85 mL Intravenous Given by Other 11/7/24 0025)   sodium chloride 0.9 % bolus 1,000 mL (0 mL Intravenous Stopped 11/7/24 0301)   dextrose (D50W) (25 g/50 mL) IV injection 25 g (25 g Intravenous Given 11/7/24 0256)   acetaminophen (TYLENOL) suppository 650 mg (650 mg Rectal Given 11/7/24 0611)       Imaging results:  CT Abdomen Pelvis With Contrast    Result Date: 11/7/2024  Electronically signed by Katie Avila MD on 11-07-24 at 0039    CT Head Without Contrast    Result Date: 11/7/2024  Electronically signed by Katie Avila MD on 11-07-24 at 0037    XR Chest 1 View    Result Date: 11/6/2024  Electronically signed by Katie Avila MD on 11-06-24 at 2340     Ambulatory status:   - bedrest    Social issues:   Social History     Socioeconomic History    Marital status:    Tobacco Use    Smoking status: Never   Vaping Use    Vaping status: Never Used   Substance and Sexual Activity    Alcohol use: Yes     Comment: drinks vodka daily up to 1/2 fifth, last drink 10/30/16    Drug use: Defer    Sexual activity: Defer       Peripheral Neurovascular  Peripheral Neurovascular (Adult)  Peripheral Neurovascular WDL: WDL    Neuro Cognitive  Neuro Cognitive (Adult)  Cognitive/Neuro/Behavioral WDL: .WDL except, orientation  Orientation: disoriented to, time, situation    Learning  Learning Assessment  Learning Readiness and Ability: cognitive limitation noted  Education Provided  Person Taught: patient  Teaching Method: verbal instruction  Teaching Focus: symptom/problem overview  Education Outcome Evaluation: eager to learn    Respiratory  Respiratory WDL  Respiratory WDL: WDL  Breath Sounds  All Lung Fields Breath Sounds: All Fields  All Lung Fields Breath Sounds: Anterior:, clear    Abdominal Pain       Pain Assessments  Pain (Adult)  (0-10)  Pain Rating: Rest: 8  Pain Location: abdomen    NIH Stroke Scale       Maddie Calix RN  11/07/24 10:33 EST

## 2024-11-07 NOTE — ED NOTES
Pt arrives via EMS from home. Per pt's , pt had two seizures tonight with no known history of past seizures. Pt is diabetic, with FSBG of 45. Pt was post ictal with EMS arrival, and glucose correction of oral glucose and D10. Most recent FSBG 66. Pt is alert to self, and understands where she is (hospital). Per EMS her mentation has been gradually improving.

## 2024-11-07 NOTE — CASE MANAGEMENT/SOCIAL WORK
Discharge Planning Assessment  [facility]     Patient Name: Corinna Almonte  MRN: [id number]  Today's Date: 11/7/2024    Admit Date: 11/6/2024    Plan: Home with spouse   Discharge Needs Assessment       Row Name 11/07/24 1404       Living Environment    People in Home spouse    Current Living Arrangements home    Potentially Unsafe Housing Conditions none    Primary Care Provided by self    Provides Primary Care For no one    Family Caregiver if Needed spouse    Quality of Family Relationships involved;helpful;supportive    Able to Return to Prior Arrangements yes       Resource/Environmental Concerns    Resource/Environmental Concerns none       Transition Planning    Patient/Family Anticipates Transition to home    Transportation Anticipated family or friend will provide       Discharge Needs Assessment    Readmission Within the Last 30 Days no previous admission in last 30 days    Current Outpatient/Agency/Support Group outpatient substance abuse treatment    Equipment Currently Used at Home shower chair    Concerns to be Addressed substance/tobacco abuse/use    Anticipated Changes Related to Illness none    Equipment Needed After Discharge none                   Discharge Plan       Row Name 11/07/24 1404       Plan    Plan Home with spouse    Plan Comments CCP met with patient's spouse at bedside. CCP role explained and discharge planning discussed. Face sheet verified [facility] Patient lives with her spouse. Patient's bedroom and bathroom are on the main level. Patient has shower chair present in her bathroom. Patient is normally independent with her ADLs. Patient has not used home health or been to SNF. Patient has not been to substance abuse treatment before. Patient's spouse is open to physical rehab or substance abuse rehab if it is needed. CCP will follow for PT eval when appropriate and will need access consult when appropriate. Kathy CARRANZA                   Continued Care and Services - Admitted Since 11/6/2024    No active coordination exists for this encounter.          Demographic Summary       Row Name 11/07/24 1403       General Information    Admission Type inpatient    Arrived From emergency department    Referral Source admission list    Reason for Consult discharge planning    Preferred Language English                   Functional Status       Row Name 11/07/24 1404       Functional Status    Usual Activity Tolerance good    Current Activity Tolerance good       Functional Status, IADL    Medications independent    Meal Preparation independent    Housekeeping independent    Laundry independent    Shopping independent       Mental Status    General Appearance WDL WDL       Mental Status Summary    Recent Changes in Mental Status/Cognitive Functioning no changes                   Psychosocial    No documentation.                  Abuse/Neglect    No documentation.                  Legal    No documentation.                  Substance Abuse    No documentation.                  Patient Forms    No documentation.                     DILLON Magaña
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
## 2024-11-07 NOTE — CASE MANAGEMENT/SOCIAL WORK
Continued Stay Note  [facility]     Patient Name: Corinna Almonte  MRN: [id number]  Today's Date: 11/7/2024    Admit Date: 11/6/2024    Plan: Home with spouse   Discharge Plan       Row Name 11/07/24 1551       Plan    Plan Comments CCP received message from /Alicia; states [facility] and [facility] not [facility] due to patient needing neurology/neurosurgery services due to DX of seizures. Kathy CARRANZA      Row Name 11/07/24 1536       Plan    Plan Comments Patient's [facility]. Patient's spouse signed [facility] transfer form. CCP faxed form to the [facility].  calling [facility] to  notify. CCP will follow to see if [facility] has beds [facility] and if patient is stable to transfer. Kathy CARRANZA      Row Name 11/07/24 1404       Plan    Plan Home with spouse    Plan Comments CCP met with patient's spouse at bedside. CCP role explained and discharge planning discussed. Face sheet verified and [facility] Patient lives with her spouse. Patient's bedroom and bathroom are on the main level. Patient has shower chair present in her bathroom. Patient is normally independent with her ADLs. Patient has not used home health or been to SNF. Patient has not been to substance abuse treatment before. Patient's spouse is open to physical rehab or substance abuse rehab if it is needed. CCP will follow for PT eval when appropriate and will need access consult when appropriate. Kathy CARRANZA                   Discharge Codes    No documentation.                 Expected Discharge Date and Time       Expected Discharge Date Expected Discharge Time    Nov 9, 2024               DILLON Magaña

## 2024-11-07 NOTE — PROGRESS NOTES
"  Daytime intensivist note.   [facility] EMERGENCY DEPARTMENT  11/7/2024    Patient:  Name:  Corinna Almonte  MRN:  [id number]  1985  39 y.o.  female         CC/reason for visit:  Called by nursing secondary to anemia    Interval History:  Chart reviewed patient admitted overnight secondary to anemia acute blood loss anemia diabetes alcoholic liver cirrhosis concern for upper GI bleed epistaxis seizure in setting of alcohol abuse and chronic pancreatitis.    Concern for GI bleed GI has been consulted.  Transfused 1 unit overnight.  Hemoglobin still 6.2 another 2 units stat have been ordered as well as stat platelets to go with this.  Patient continues on CIWA protocol with good blood pressure on octreotide and Protonix.        Physical Exam:  /72   Pulse 102   Temp (!) 101.4 °F (38.6 °C) (Oral)   Resp 16   Ht 162.6 cm (64\")   Wt 59 kg (130 lb)   LMP 10/24/2024 (Approximate)   SpO2 97%   BMI 22.31 kg/m²   Body mass index is 22.31 kg/m².    Intake/Output Summary (Last 24 hours) at 11/7/2024 0724  Last data filed at 11/7/2024 0502  Gross per 24 hour   Intake 2205 ml   Output 300 ml   Net 1905 ml     General appearance: Nontoxic, conversant   Eyes: anicteric sclerae, moist conjunctivae; no lidlag;    HENT: Atraumatic; oropharynx clear with moist mucous membranes    Neck: Trachea midline;  supple   Lungs: CTA, with normal respiratory effort and no intercostal retractions  CV: RRR, no rub   Abdomen: Soft, nontender; BS+  Extremities: No peripheral edema or ext lad  Skin: WWP no diffuse visible rash  Psych: Appropriate affect, alert   Neuro: Moves all ext. CN II-XII. Speech intact.    Data Review:  Notable Labs:  Results from last 7 days   Lab Units 11/07/24  0628 11/06/24  2301   WBC 10*3/mm3 4.46 4.53   HEMOGLOBIN g/dL 6.2* 6.8*   PLATELETS 10*3/mm3 45* 64*     Results from last 7 days   Lab Units 11/06/24  2301   SODIUM mmol/L 136   POTASSIUM mmol/L 3.8   CHLORIDE mmol/L 100   CO2 " mmol/L 23.8   BUN mg/dL 6   CREATININE mg/dL 0.51*   GLUCOSE mg/dL 89   CALCIUM mg/dL 9.0   MAGNESIUM mg/dL 1.6   PHOSPHORUS mg/dL 4.6*   Estimated Creatinine Clearance: 136.6 mL/min (A) (by C-G formula based on SCr of 0.51 mg/dL (L)).    Results from last 7 days   Lab Units 11/07/24  0628 11/07/24  0355 11/06/24  2301   AST (SGOT) U/L  --   --  95*   ALT (SGPT) U/L  --   --  21   PROCALCITONIN ng/mL  --   --  0.07   LACTATE mmol/L  --  2.5* 2.8*   PLATELETS 10*3/mm3 45*  --  64*             Imaging:  Reviewed chest images personally from past 3 days    ASSESSMENT  /  PLAN:  Seizure x 2 with postictal state  Alcohol use disorder  Alcoholic liver cirrhosis  Vomiting  Epistaxis  Possible upper GI bleed  Acute blood loss anemia  Thrombocytopenia  History of gastric ulcer  History of esophageal variceal bleeding with banding  History of abdominal ascites requiring paracentesis 2023  Diabetes mellitus 2, insulin-dependent with hypoglycemia  GERD  Chronic pancreatitis  Traumatic brain injury  PTSD  Anxiety and depression  Hypoglycemia  Elevated ammonia per GI  TSH mildly elevated check free T3 and free T4    Chart reviewed  Resuscitation/transfusion of blood products:  Stat 2 units PRBCs  Stat platelets secondary to 3 units of PRBCs given thrombocytopenia below 50 and active bleeding  INR elevated at 2.08 will give FFP as well    D10 for hypoglycemia and dextrose drugs frequent amps of D50 needing    Continue CIWA protocol   Continue seizure precautions  As per neurologist opinion given seizure history concern for ongoing    GI has been consulted plan on scoping today.  Have seen patient discussed with bedside nursing.    Will establish good IV access given multiple drips critical illness need for blood transfusions  Consult for PICC line placed    Discussed with the ER nurse Maddie went through orders discussed needs discussed plan of care for today.  Let her know to contact me with any patient needs today      Total  critical care time was 45 minutes, excluding any separately billable procedure time.  Time did not overlap with any other provider.    Electronically signed by Clarke Irvin MD, 11/07/24, 8:25 AM EST.

## 2024-11-07 NOTE — NURSING NOTE
Iv team consulted to place a picc due to poor and difficult access, and critical illness. Pt currently has 3 working PIVs -RAC and 2 LFAs , all of which are infusing multiple incompatibles (meds and blood). Dr Alfred is planning a EGD for later today per his note. Labs and xray is clear for line placement. Spoke with Maddie MEDINA - she has been unable to obtain consent, education, or behavorial contract from spouse this am. EGD time has yet TBD, and pt has required ativan several times due to agitation and under [facility] protocol. Iv team will continue to follow.

## 2024-11-07 NOTE — ANESTHESIA PREPROCEDURE EVALUATION
Anesthesia Evaluation     Patient summary reviewed and Nursing notes reviewed   NPO Solid Status: Waived due to emergency  NPO Liquid Status: Waived due to emergency           Airway   Mallampati: II  TM distance: >3 FB  Neck ROM: full  Dental      Pulmonary - negative pulmonary ROS   Cardiovascular - negative cardio ROS        Neuro/Psych  (+) seizures, psychiatric history Depression  GI/Hepatic/Renal/Endo    (+) GI bleeding , hepatitis, liver disease, diabetes mellitus, thyroid problem     ROS Comment: Varices    Musculoskeletal (-) negative ROS    Abdominal    Substance History   (+) alcohol use     OB/GYN negative ob/gyn ROS         Other                    Anesthesia Plan    ASA 4 - emergent     general     intravenous induction     Anesthetic plan, risks, benefits, and alternatives have been provided, discussed and informed consent has been obtained with: spouse/significant other.    CODE STATUS:

## 2024-11-07 NOTE — PROGRESS NOTES
Elvin Rn repeat hh 6.3  Another 2 PRBC stat to be transfused, one already ordered and I ordered another stat prbc as well.  Egd reviewed varices.  Recheck post transfusion.  Pt on octreotide.  Electronically signed by Clarke Irvin MD, 11/07/24, 4:34 PM EST.

## 2024-11-08 ENCOUNTER — APPOINTMENT (OUTPATIENT)
Dept: NEUROLOGY | Facility: HOSPITAL | Age: 39
End: 2024-11-08
Payer: OTHER GOVERNMENT

## 2024-11-08 LAB
AMMONIA BLD-SCNC: 57 UMOL/L (ref 11–51)
ANION GAP SERPL CALCULATED.3IONS-SCNC: 12 MMOL/L (ref 5–15)
APTT PPP: 42.1 SECONDS (ref 22.7–35.4)
BASOPHILS # BLD AUTO: 0.01 10*3/MM3 (ref 0–0.2)
BASOPHILS NFR BLD AUTO: 0.3 % (ref 0–1.5)
BH BB BLOOD EXPIRATION DATE: NORMAL
BH BB BLOOD TYPE BARCODE: 5100
BH BB BLOOD TYPE BARCODE: 6200
BH BB BLOOD TYPE BARCODE: 9500
BH BB DISPENSE STATUS: NORMAL
BH BB PRODUCT CODE: NORMAL
BH BB UNIT NUMBER: NORMAL
BUN SERPL-MCNC: 5 MG/DL (ref 6–20)
BUN/CREAT SERPL: 11.6 (ref 7–25)
CALCIUM SPEC-SCNC: 7.8 MG/DL (ref 8.6–10.5)
CHLORIDE SERPL-SCNC: 103 MMOL/L (ref 98–107)
CO2 SERPL-SCNC: 22 MMOL/L (ref 22–29)
CREAT SERPL-MCNC: 0.43 MG/DL (ref 0.57–1)
CROSSMATCH INTERPRETATION: NORMAL
DEPRECATED RDW RBC AUTO: 42.5 FL (ref 37–54)
DEPRECATED RDW RBC AUTO: 44.1 FL (ref 37–54)
EGFRCR SERPLBLD CKD-EPI 2021: 126.3 ML/MIN/1.73
EOSINOPHIL # BLD AUTO: 0.02 10*3/MM3 (ref 0–0.4)
EOSINOPHIL NFR BLD AUTO: 0.6 % (ref 0.3–6.2)
ERYTHROCYTE [DISTWIDTH] IN BLOOD BY AUTOMATED COUNT: 15 % (ref 12.3–15.4)
ERYTHROCYTE [DISTWIDTH] IN BLOOD BY AUTOMATED COUNT: 15.2 % (ref 12.3–15.4)
GLUCOSE BLDC GLUCOMTR-MCNC: 124 MG/DL (ref 70–130)
GLUCOSE BLDC GLUCOMTR-MCNC: 281 MG/DL (ref 70–130)
GLUCOSE BLDC GLUCOMTR-MCNC: 311 MG/DL (ref 70–130)
GLUCOSE SERPL-MCNC: 116 MG/DL (ref 65–99)
HCT VFR BLD AUTO: 27.7 % (ref 34–46.6)
HCT VFR BLD AUTO: 28.4 % (ref 34–46.6)
HCT VFR BLD AUTO: 28.9 % (ref 34–46.6)
HGB BLD-MCNC: 8.6 G/DL (ref 12–15.9)
HGB BLD-MCNC: 9.5 G/DL (ref 12–15.9)
HGB BLD-MCNC: 9.6 G/DL (ref 12–15.9)
IMM GRANULOCYTES # BLD AUTO: 0.01 10*3/MM3 (ref 0–0.05)
IMM GRANULOCYTES NFR BLD AUTO: 0.3 % (ref 0–0.5)
INR PPP: 2.3 (ref 0.9–1.1)
LYMPHOCYTES # BLD AUTO: 0.8 10*3/MM3 (ref 0.7–3.1)
LYMPHOCYTES NFR BLD AUTO: 23.7 % (ref 19.6–45.3)
MAGNESIUM SERPL-MCNC: 1.4 MG/DL (ref 1.6–2.6)
MAGNESIUM SERPL-MCNC: 2.4 MG/DL (ref 1.6–2.6)
MCH RBC QN AUTO: 26.8 PG (ref 26.6–33)
MCH RBC QN AUTO: 27 PG (ref 26.6–33)
MCHC RBC AUTO-ENTMCNC: 33.8 G/DL (ref 31.5–35.7)
MCHC RBC AUTO-ENTMCNC: 34.7 G/DL (ref 31.5–35.7)
MCV RBC AUTO: 78 FL (ref 79–97)
MCV RBC AUTO: 80 FL (ref 79–97)
MONOCYTES # BLD AUTO: 0.28 10*3/MM3 (ref 0.1–0.9)
MONOCYTES NFR BLD AUTO: 8.3 % (ref 5–12)
NEUTROPHILS NFR BLD AUTO: 2.26 10*3/MM3 (ref 1.7–7)
NEUTROPHILS NFR BLD AUTO: 66.8 % (ref 42.7–76)
PHOSPHATE SERPL-MCNC: 3 MG/DL (ref 2.5–4.5)
PLATELET # BLD AUTO: 61 10*3/MM3 (ref 140–450)
PLATELET # BLD AUTO: 74 10*3/MM3 (ref 140–450)
PMV BLD AUTO: 10.8 FL (ref 6–12)
PMV BLD AUTO: 9.9 FL (ref 6–12)
POTASSIUM SERPL-SCNC: 2.8 MMOL/L (ref 3.5–5.2)
PROTHROMBIN TIME: 25.6 SECONDS (ref 11.7–14.2)
RBC # BLD AUTO: 3.55 10*6/MM3 (ref 3.77–5.28)
RBC # BLD AUTO: 3.55 10*6/MM3 (ref 3.77–5.28)
SODIUM SERPL-SCNC: 137 MMOL/L (ref 136–145)
UNIT  ABO: NORMAL
UNIT  RH: NORMAL
WBC NRBC COR # BLD AUTO: 3.43 10*3/MM3 (ref 3.4–10.8)
WBC NRBC COR # BLD AUTO: 3.66 10*3/MM3 (ref 3.4–10.8)

## 2024-11-08 PROCEDURE — 85730 THROMBOPLASTIN TIME PARTIAL: CPT

## 2024-11-08 PROCEDURE — 25010000002 THIAMINE HCL 200 MG/2ML SOLUTION: Performed by: HOSPITALIST

## 2024-11-08 PROCEDURE — 82140 ASSAY OF AMMONIA: CPT | Performed by: INTERNAL MEDICINE

## 2024-11-08 PROCEDURE — 83735 ASSAY OF MAGNESIUM: CPT | Performed by: HOSPITALIST

## 2024-11-08 PROCEDURE — 25010000002 OCTREOTIDE PER 25 MCG: Performed by: EMERGENCY MEDICINE

## 2024-11-08 PROCEDURE — 95816 EEG AWAKE AND DROWSY: CPT | Performed by: STUDENT IN AN ORGANIZED HEALTH CARE EDUCATION/TRAINING PROGRAM

## 2024-11-08 PROCEDURE — 85018 HEMOGLOBIN: CPT | Performed by: HOSPITALIST

## 2024-11-08 PROCEDURE — 85025 COMPLETE CBC W/AUTO DIFF WBC: CPT | Performed by: HOSPITALIST

## 2024-11-08 PROCEDURE — 25010000002 MAGNESIUM SULFATE 2 GM/50ML SOLUTION: Performed by: HOSPITALIST

## 2024-11-08 PROCEDURE — 82948 REAGENT STRIP/BLOOD GLUCOSE: CPT

## 2024-11-08 PROCEDURE — 99232 SBSQ HOSP IP/OBS MODERATE 35: CPT | Performed by: INTERNAL MEDICINE

## 2024-11-08 PROCEDURE — 85027 COMPLETE CBC AUTOMATED: CPT | Performed by: INTERNAL MEDICINE

## 2024-11-08 PROCEDURE — 85610 PROTHROMBIN TIME: CPT

## 2024-11-08 PROCEDURE — 94799 UNLISTED PULMONARY SVC/PX: CPT

## 2024-11-08 PROCEDURE — 94761 N-INVAS EAR/PLS OXIMETRY MLT: CPT

## 2024-11-08 PROCEDURE — 25010000002 LORAZEPAM PER 2 MG: Performed by: HOSPITALIST

## 2024-11-08 PROCEDURE — 84100 ASSAY OF PHOSPHORUS: CPT | Performed by: HOSPITALIST

## 2024-11-08 PROCEDURE — 80048 BASIC METABOLIC PNL TOTAL CA: CPT | Performed by: HOSPITALIST

## 2024-11-08 PROCEDURE — 99233 SBSQ HOSP IP/OBS HIGH 50: CPT | Performed by: NURSE PRACTITIONER

## 2024-11-08 PROCEDURE — 85014 HEMATOCRIT: CPT | Performed by: HOSPITALIST

## 2024-11-08 PROCEDURE — 25010000002 CEFTRIAXONE PER 250 MG

## 2024-11-08 PROCEDURE — 95819 EEG AWAKE AND ASLEEP: CPT

## 2024-11-08 PROCEDURE — 25010000002 POTASSIUM CHLORIDE 10 MEQ/100ML SOLUTION: Performed by: INTERNAL MEDICINE

## 2024-11-08 RX ORDER — MAGNESIUM SULFATE HEPTAHYDRATE 40 MG/ML
2 INJECTION, SOLUTION INTRAVENOUS
Status: COMPLETED | OUTPATIENT
Start: 2024-11-08 | End: 2024-11-08

## 2024-11-08 RX ORDER — POTASSIUM CHLORIDE 7.45 MG/ML
10 INJECTION INTRAVENOUS
Status: COMPLETED | OUTPATIENT
Start: 2024-11-08 | End: 2024-11-09

## 2024-11-08 RX ADMIN — MAGNESIUM SULFATE HEPTAHYDRATE 2 G: 40 INJECTION, SOLUTION INTRAVENOUS at 10:21

## 2024-11-08 RX ADMIN — LORAZEPAM 2 MG: 2 INJECTION INTRAMUSCULAR; INTRAVENOUS at 18:58

## 2024-11-08 RX ADMIN — MUPIROCIN 1 APPLICATION: 20 OINTMENT TOPICAL at 08:33

## 2024-11-08 RX ADMIN — OCTREOTIDE ACETATE 25 MCG/HR: 500 INJECTION, SOLUTION INTRAVENOUS; SUBCUTANEOUS at 18:58

## 2024-11-08 RX ADMIN — LORAZEPAM 2 MG: 2 INJECTION INTRAMUSCULAR; INTRAVENOUS at 03:18

## 2024-11-08 RX ADMIN — POTASSIUM CHLORIDE 10 MEQ: 7.46 INJECTION, SOLUTION INTRAVENOUS at 18:10

## 2024-11-08 RX ADMIN — LORAZEPAM 2 MG: 2 INJECTION INTRAMUSCULAR; INTRAVENOUS at 05:20

## 2024-11-08 RX ADMIN — LORAZEPAM 2 MG: 2 INJECTION INTRAMUSCULAR; INTRAVENOUS at 10:34

## 2024-11-08 RX ADMIN — LORAZEPAM 2 MG: 2 INJECTION INTRAMUSCULAR; INTRAVENOUS at 03:45

## 2024-11-08 RX ADMIN — POTASSIUM CHLORIDE 10 MEQ: 7.46 INJECTION, SOLUTION INTRAVENOUS at 15:54

## 2024-11-08 RX ADMIN — THIAMINE HYDROCHLORIDE 200 MG: 100 INJECTION, SOLUTION INTRAMUSCULAR; INTRAVENOUS at 15:54

## 2024-11-08 RX ADMIN — LORAZEPAM 2 MG: 2 INJECTION INTRAMUSCULAR; INTRAVENOUS at 06:38

## 2024-11-08 RX ADMIN — Medication 10 ML: at 10:12

## 2024-11-08 RX ADMIN — LORAZEPAM 2 MG: 2 INJECTION INTRAMUSCULAR; INTRAVENOUS at 00:44

## 2024-11-08 RX ADMIN — LORAZEPAM 1 MG: 2 INJECTION, SOLUTION INTRAMUSCULAR; INTRAVENOUS at 21:28

## 2024-11-08 RX ADMIN — POTASSIUM CHLORIDE 10 MEQ: 7.46 INJECTION, SOLUTION INTRAVENOUS at 17:04

## 2024-11-08 RX ADMIN — PANTOPRAZOLE SODIUM 40 MG: 40 INJECTION, POWDER, FOR SOLUTION INTRAVENOUS at 21:27

## 2024-11-08 RX ADMIN — LORAZEPAM 2 MG: 2 INJECTION INTRAMUSCULAR; INTRAVENOUS at 13:13

## 2024-11-08 RX ADMIN — THIAMINE HYDROCHLORIDE 200 MG: 100 INJECTION, SOLUTION INTRAMUSCULAR; INTRAVENOUS at 21:27

## 2024-11-08 RX ADMIN — FOLIC ACID 1 MG: 5 INJECTION, SOLUTION INTRAMUSCULAR; INTRAVENOUS; SUBCUTANEOUS at 08:33

## 2024-11-08 RX ADMIN — Medication 10 ML: at 21:27

## 2024-11-08 RX ADMIN — LORAZEPAM 2 MG: 2 INJECTION INTRAMUSCULAR; INTRAVENOUS at 04:40

## 2024-11-08 RX ADMIN — MAGNESIUM SULFATE HEPTAHYDRATE 2 G: 40 INJECTION, SOLUTION INTRAVENOUS at 06:42

## 2024-11-08 RX ADMIN — MUPIROCIN 1 APPLICATION: 20 OINTMENT TOPICAL at 21:27

## 2024-11-08 RX ADMIN — DEXTROSE MONOHYDRATE 150 ML/HR: 100 INJECTION, SOLUTION INTRAVENOUS at 03:46

## 2024-11-08 RX ADMIN — POTASSIUM CHLORIDE 10 MEQ: 7.46 INJECTION, SOLUTION INTRAVENOUS at 19:35

## 2024-11-08 RX ADMIN — LORAZEPAM 2 MG: 2 INJECTION INTRAMUSCULAR; INTRAVENOUS at 01:23

## 2024-11-08 RX ADMIN — POTASSIUM CHLORIDE 10 MEQ: 7.46 INJECTION, SOLUTION INTRAVENOUS at 21:24

## 2024-11-08 RX ADMIN — LORAZEPAM 2 MG: 2 INJECTION INTRAMUSCULAR; INTRAVENOUS at 07:58

## 2024-11-08 RX ADMIN — POTASSIUM CHLORIDE 10 MEQ: 7.46 INJECTION, SOLUTION INTRAVENOUS at 23:58

## 2024-11-08 RX ADMIN — LORAZEPAM 2 MG: 2 INJECTION INTRAMUSCULAR; INTRAVENOUS at 10:58

## 2024-11-08 RX ADMIN — THIAMINE HYDROCHLORIDE 200 MG: 100 INJECTION, SOLUTION INTRAMUSCULAR; INTRAVENOUS at 05:27

## 2024-11-08 RX ADMIN — LORAZEPAM 2 MG: 2 INJECTION INTRAMUSCULAR; INTRAVENOUS at 01:43

## 2024-11-08 RX ADMIN — MAGNESIUM SULFATE HEPTAHYDRATE 2 G: 40 INJECTION, SOLUTION INTRAVENOUS at 05:22

## 2024-11-08 RX ADMIN — LORAZEPAM 2 MG: 2 INJECTION, SOLUTION INTRAMUSCULAR; INTRAVENOUS at 02:16

## 2024-11-08 RX ADMIN — PANTOPRAZOLE SODIUM 40 MG: 40 INJECTION, POWDER, FOR SOLUTION INTRAVENOUS at 08:34

## 2024-11-08 RX ADMIN — CEFTRIAXONE SODIUM 1000 MG: 1 INJECTION, POWDER, FOR SOLUTION INTRAMUSCULAR; INTRAVENOUS at 23:57

## 2024-11-08 RX ADMIN — LORAZEPAM 2 MG: 2 INJECTION INTRAMUSCULAR; INTRAVENOUS at 08:34

## 2024-11-08 NOTE — PROGRESS NOTES
"[facility]     Mar/chart reviewed  Follow up cirrhosis, gi bleed, seziures and alcohol abuse  No increased abdominal pain    Vital Sign Min/Max for last 24 hours  Temp  Min: 97.7 °F (36.5 °C)  Max: 98.3 °F (36.8 °C)   BP  Min: 103/77  Max: 150/101   Pulse  Min: 73  Max: 111   Resp  Min: 14  Max: 18   SpO2  Min: 95 %  Max: 100 %   Flow (L/min) (Oxygen Therapy)  Min: 2  Max: 2   Weight  Min: 57.4 kg (126 lb 8.7 oz)  Max: 57.4 kg (126 lb 8.7 oz)     Appears ill drowsy, not oriented, says \"apples\"   perrl, eomi, +icterus,  Mm dry some dried blood, no jvd, trachea midline, neck supple,  chest cta bilaterally, no crackles, no wheezes,   rrr,   soft, nt, somewhat distended+bs,  no c/c/e  Skin warm, dry no rashes +juandice    Labs: 11/8: reviewed:  Glucose 116  Bun 5  Cr 0.43  Bicarb 22  K 2.8  Inr 2.3  Wbc 3.4  Hgb 9.5  Plts 61    ASSESSMENT  /  PLAN:  Seizure x 2 with postictal state  Alcohol use disorder  Alcoholic liver cirrhosis  Vomiting  Epistaxis  Possible upper GI bleed  Acute blood loss anemia  Thrombocytopenia  History of gastric ulcer  History of esophageal variceal bleeding with banding  History of abdominal ascites requiring paracentesis 2023  Diabetes mellitus 2, insulin-dependent with hypoglycemia  GERD  Chronic pancreatitis  Traumatic brain injury  PTSD  Anxiety and depression  Hypoglycemia  Elevated ammonia per GI  TSH mildly elevated check free T3 and free T4    Doesn't appear to have signficant continued bleeding  Blood sugars better  Replace K  Question worsening hepatic encephalopathy? Check ammonia, note she is not on lactulose or rifaxamin.    Continue current, continue to monitor in unit today  Patient is new to me today  "

## 2024-11-08 NOTE — PROGRESS NOTES
DOS: 2024  NAME: Corinna Almonte   : 1985  PCP: System, Provider Not In    Chief Complaint   Patient presents with    Hypoglycemia    Seizures         Subjective: Pt seen in follow up, however the problem is new to me.  Patient lying in bed resting comfortably.  States she thinks she feels okay.  No family at bedside.    Objective:  Vital signs:   Vitals:    24 0600 24 0700 24 0739 24 0834   BP: 137/89 138/98     BP Location:       Patient Position:       Pulse: 75 90     Resp:       Temp:   98.2 °F (36.8 °C)    TempSrc:   Oral    SpO2: 97%   95%   Weight:       Height:           Current Facility-Administered Medications:     acetaminophen (TYLENOL) tablet 650 mg, 650 mg, Oral, Once, Anabela Edmond APRN    sennosides-docusate (PERICOLACE) 8.6-50 MG per tablet 2 tablet, 2 tablet, Oral, BID **AND** polyethylene glycol (MIRALAX) packet 17 g, 17 g, Oral, Daily PRN **AND** bisacodyl (DULCOLAX) EC tablet 5 mg, 5 mg, Oral, Daily PRN **AND** bisacodyl (DULCOLAX) suppository 10 mg, 10 mg, Rectal, Daily PRN, Anabela Edmond APRN    cefTRIAXone (ROCEPHIN) 1,000 mg in sodium chloride 0.9 % 100 mL MBP, 1,000 mg, Intravenous, Q24H, Anabela Edmond APRN, Last Rate: 200 mL/hr at 24 2244, 1,000 mg at 24 2244    dextrose (D50W) (25 g/50 mL) IV injection 25 g, 25 g, Intravenous, PRN, Mel Gentile MD, 25 g at 24 190    folic acid 1 mg in sodium chloride 0.9 % 50 mL IVPB, 1 mg, Intravenous, Daily, Luis A Melgar MD, Last Rate: 100 mL/hr at 24 0833, 1 mg at 24 0833    [] LORazepam (ATIVAN) injection 2 mg, 2 mg, Intravenous, Q6H, 2 mg at 24 **FOLLOWED BY** LORazepam (ATIVAN) injection 1 mg, 1 mg, Intravenous, Q6H, Luis A Melgar MD    LORazepam (ATIVAN) tablet 1 mg, 1 mg, Oral, Q1H PRN **OR** LORazepam (ATIVAN) injection 1 mg, 1 mg, Intravenous, Q1H PRN **OR** LORazepam (ATIVAN) tablet 2 mg, 2 mg, Oral, Q1H PRN **OR** LORazepam  (ATIVAN) injection 2 mg, 2 mg, Intravenous, Q1H PRN, 2 mg at 11/08/24 0758 **OR** LORazepam (ATIVAN) injection 2 mg, 2 mg, Intravenous, Q15 Min PRN, 2 mg at 11/08/24 1313 **OR** LORazepam (ATIVAN) injection 2 mg, 2 mg, Intramuscular, Q15 Min PRN, 2 mg at 11/08/24 0216 **OR** LORazepam (ATIVAN) tablet 4 mg, 4 mg, Oral, Q1H PRN **OR** LORazepam (ATIVAN) injection 4 mg, 4 mg, Intravenous, Q1H PRN, Luis A Melgar MD    Magnesium Standard Dose Replacement - Follow Nurse / BPA Driven Protocol, , Does not apply, PRN, Luis A Melgar MD    mupirocin (BACTROBAN) 2 % nasal ointment 1 Application, 1 Application, Each Nare, BID, Anabela Edmond APRN, 1 Application at 11/08/24 0833    nitroglycerin (NITROSTAT) SL tablet 0.4 mg, 0.4 mg, Sublingual, Q5 Min PRN, Anabela Edmond APRN    octreotide (sandoSTATIN) 500 mcg in sodium chloride 0.9 % 100 mL (5 mcg/mL) infusion, 25 mcg/hr, Intravenous, Continuous, Mel Gentile MD, Last Rate: 5 mL/hr at 11/08/24 0650, 25 mcg/hr at 11/08/24 0650    ondansetron (ZOFRAN) injection 4 mg, 4 mg, Intravenous, Q6H PRN, Anabela Edmond APRN    pantoprazole (PROTONIX) injection 40 mg, 40 mg, Intravenous, Q12H, Yony Brooks MD, 40 mg at 11/08/24 0834    Potassium Replacement - Follow Nurse / BPA Driven Protocol, , Does not apply, NOY, Jase Irvin MD    [COMPLETED] Insert Peripheral IV, , , Once **AND** sodium chloride 0.9 % flush 10 mL, 10 mL, Intravenous, PRN, Mel Gentile MD    sodium chloride 0.9 % flush 10 mL, 10 mL, Intravenous, Q12H, Anabela Edmond, APRN, 10 mL at 11/08/24 1012    thiamine (B-1) injection 200 mg, 200 mg, Intravenous, Q8H, 200 mg at 11/08/24 0527 **FOLLOWED BY** [START ON 11/12/2024] thiamine (VITAMIN B-1) tablet 100 mg, 100 mg, Oral, Daily, Luis A Melgar MD    PRN meds    senna-docusate sodium **AND** polyethylene glycol **AND** bisacodyl **AND** bisacodyl    dextrose    LORazepam **OR** LORazepam **OR** LORazepam **OR** LORazepam  **OR** LORazepam **OR** LORazepam **OR** LORazepam **OR** LORazepam    Magnesium Standard Dose Replacement - Follow Nurse / BPA Driven Protocol    nitroglycerin    ondansetron    Potassium Replacement - Follow Nurse / BPA Driven Protocol    [COMPLETED] Insert Peripheral IV **AND** sodium chloride    No current facility-administered medications on file prior to encounter.     Current Outpatient Medications on File Prior to Encounter   Medication Sig    folic acid (FOLVITE) 1 MG tablet Take 1 tablet by mouth Daily.    insulin glargine (LANTUS, SEMGLEE) 100 UNIT/ML injection Inject 30 Units under the skin into the appropriate area as directed Every Night.    Insulin Lispro (humaLOG) 100 UNIT/ML injection Inject 10 Units under the skin into the appropriate area as directed 3 (Three) Times a Day Before Meals.    lactulose (CHRONULAC) 10 GM/15ML solution Take 45 mL by mouth 2 (Two) Times a Day.    levothyroxine (SYNTHROID, LEVOTHROID) 100 MCG tablet Take  by mouth Daily.    pantoprazole (PROTONIX) 40 MG EC tablet Take 1 tablet by mouth Daily.    thiamine (VITAMIN B1) 100 MG tablet Take 1 tablet by mouth Daily.       General appearance: Appears older than stated age, NAD, alert and cooperative  HEENT: Normocephalic, atraumatic    Neurological:   MS: oriented to person and place, disoriented to year, recent/remote memory impaired, decreased attention/concentration, language intact  CN: visual fields full, EOMI, facial sensation equal, no facial droop, tongue midline  Motor: 5/5 all 4 extremities   Sensory: light touch sensation intact in all 4 ext.    Laboratory results:  Lab Results   Component Value Date    TSH 7.010 (H) 11/06/2024     Lab Results   Component Value Date    [id number] 893 12/07/2023     Lab Results   Component Value Date    HGBA1C 7.90 (H) 11/07/2024     Lab Results   Component Value Date    GLUCOSE 116 (H) 11/08/2024    BUN 5 (L) 11/08/2024    CREATININE 0.43 (L) 11/08/2024    EGFRIFNONA 134 12/03/2016     "BCR 11.6 11/08/2024    K 2.8 (L) 11/08/2024    CO2 22.0 11/08/2024    CALCIUM 7.8 (L) 11/08/2024    ALBUMIN 3.6 11/06/2024    AST 95 (H) 11/06/2024    ALT 21 11/06/2024     Lab Results   Component Value Date    WBC 3.43 11/08/2024    HGB 8.6 (L) 11/08/2024    HCT 28.9 (L) 11/08/2024    MCV 80.0 11/08/2024    PLT 61 (L) 11/08/2024     Brief Urine Lab Results  (Last result in the past 365 days)        Color   Clarity   Blood   Leuk Est   Nitrite   Protein   CREAT   Urine HCG        11/07/24 0010 Yellow   Clear   Negative   Negative   Negative   Trace                 Blood Culture   Date Value Ref Range Status   11/06/2024 No growth at 24 hours  Preliminary   11/06/2024 No growth at 24 hours  Preliminary     No results found for: \"BCIDPCR\", \"CXREFLEX\", \"CSFCX\", \"CULTURETIS\"  No results found for: \"CULTURES\", \"HSVCX\", \"URCX\"  No results found for: \"EYECULTURE\", \"GCCX\", \"HSVCULTURE\", \"LABHSV\"  No results found for: \"LEGIONELLA\", \"MRSACX\", \"MUMPSCX\", \"MYCOPLASCX\"  No results found for: \"NOCARDIACX\", \"STOOLCX\"  No results found for: \"THROATCX\", \"UNSTIMCULT\", \"URINECX\", \"CULTURE\", \"VZVCULTUR\"  No results found for: \"VIRALCULTU\", \"WOUNDCX\"  Pain Management Panel  More data exists         Latest Ref Rng & Units 11/7/2024 12/6/2023   Pain Management Panel   Barbiturates Screen, Urine Negative Negative  Negative    Benzodiazepine Screen, Urine Negative Negative  Negative    Cocaine Screen, Urine Negative Negative  Negative    Fentanyl, Urine Negative Negative  Negative    Methadone Screen , Urine Negative Negative  Negative       Details                 Ethanol <10  Acetaminophen <5.0    Review and interpretation of imaging:  CT Abdomen Pelvis With Contrast    Result Date: 11/7/2024  Electronically signed by Katie Avila MD on 11-07-24 at 0039    CT Head Without Contrast    Result Date: 11/7/2024  Electronically signed by Katie Avila MD on 11-07-24 at 0037    XR Chest 1 View    Result Date: 11/6/2024  Electronically signed by Katie Avila, " MD on 11-06-24 at 2340       Impression/Assessment:  This is a 39-year-old female with past medical history of alcohol abuse, anxiety and depression, thyroid disease, esophageal varices, prior alcohol withdrawal seizure who presented to the hospital on 11/6/2024 with complaints of suffering 2 seizures.  EMS had reported that she was hypoglycemic with a blood glucose of 40 in the field.  She was given D10.  She has continued to be intermittently hypoglycemic here.  She had reported being sober for about 2 days prior to her admission.  She had a noncontrast CT head that did not reveal any acute intracranial abnormalities.  Since admission she has been worked up for hematemesis and epistaxis.  She underwent an endoscopy and is now status post banding of esophageal variceal bleeding.  We saw her back in December 2023 where she presented after suffering a seizure in the setting of being sober for a day and a half prior to that admission.    Diagnosis:  Alcohol withdrawal seizure disorder  Alcoholic cirrhosis with esophageal varices  Alcohol dependence  Hypomagnesemia  Hypoglycemia    Work up to date:  11/6 Mg 1.6  11/8 ammonia 57, Mg 1.4, Repeat Mg 2.4    Plan:  - EEG pending, I have reached out to our reading epileptologist to have him let me know the results.  - Hold off on any AED therapy given suspected provoked etiology from alcohol withdrawal  - Seizure precautions  - Access center to see for alcohol dependency  - Keep Mg 2.0 or >  - Primary concerned for hepatic encephalopathy, ammonia still elevated but trending down. Recommend continue to trend. She was awake and following all commands on my exam but slow with responses. Need to closely watch blood sugars as she was in the 30's last night as well as she is getting frequent doses of Lorazepam from the [facility] protocol.   Will follow peripherally to review EEG.    Case discussed with patient and Dr. Mead, and he agrees with plan above.     Addenda 1441: Final  report pending but per our reading epileptologist her EEG is normal. Please call us back with any questions, we will sign off and see again per request.     VIRGEN Chavez

## 2024-11-08 NOTE — NURSING NOTE
Access Center reached out to patient's nurse. Patient's nurse reported patient is not appropriate for an evaluation. Patient with high CIWA and frequent Ativan throughout day. Access Center will attempt to see when patient more appropriate.

## 2024-11-08 NOTE — PROGRESS NOTES
[facility]  Inpatient Progress Note    Reason for Follow Up: Cirrhosis, GI bleed    Subjective     Interval History:   Status  post banding of esophageal variceal bleed.  Portal hypertensive gastropathy found no further bleeding    Current Facility-Administered Medications:     acetaminophen (TYLENOL) tablet 650 mg, 650 mg, Oral, Once, Anabela Edmond APRN    sennosides-docusate (PERICOLACE) 8.6-50 MG per tablet 2 tablet, 2 tablet, Oral, BID **AND** polyethylene glycol (MIRALAX) packet 17 g, 17 g, Oral, Daily PRN **AND** bisacodyl (DULCOLAX) EC tablet 5 mg, 5 mg, Oral, Daily PRN **AND** bisacodyl (DULCOLAX) suppository 10 mg, 10 mg, Rectal, Daily PRN, Anabela Edmond APRN    cefTRIAXone (ROCEPHIN) 1,000 mg in sodium chloride 0.9 % 100 mL MBP, 1,000 mg, Intravenous, Q24H, Anabela Edmond APRN, Last Rate: 200 mL/hr at 24 2244, 1,000 mg at 24 2244    dextrose (D50W) (25 g/50 mL) IV injection 25 g, 25 g, Intravenous, PRN, Mel Gentile MD, 25 g at 24 190    folic acid 1 mg in sodium chloride 0.9 % 50 mL IVPB, 1 mg, Intravenous, Daily, Luis A Melgar MD, Last Rate: 100 mL/hr at 24 0833, 1 mg at 24 0833    [] LORazepam (ATIVAN) injection 2 mg, 2 mg, Intravenous, Q6H, 2 mg at 24 **FOLLOWED BY** LORazepam (ATIVAN) injection 1 mg, 1 mg, Intravenous, Q6H, Luis A Melgar MD    LORazepam (ATIVAN) tablet 1 mg, 1 mg, Oral, Q1H PRN **OR** LORazepam (ATIVAN) injection 1 mg, 1 mg, Intravenous, Q1H PRN **OR** LORazepam (ATIVAN) tablet 2 mg, 2 mg, Oral, Q1H PRN **OR** LORazepam (ATIVAN) injection 2 mg, 2 mg, Intravenous, Q1H PRN, 2 mg at 24 0758 **OR** LORazepam (ATIVAN) injection 2 mg, 2 mg, Intravenous, Q15 Min PRN, 2 mg at 24 1058 **OR** LORazepam (ATIVAN) injection 2 mg, 2 mg, Intramuscular, Q15 Min PRN, 2 mg at 24 0216 **OR** LORazepam (ATIVAN) tablet 4 mg, 4 mg, Oral, Q1H PRN **OR** LORazepam (ATIVAN) injection 4 mg,  4 mg, Intravenous, Q1H PRN, Luis A Melgar MD    Magnesium Standard Dose Replacement - Follow Nurse / BPA Driven Protocol, , Does not apply, PRN, Luis A Melgar MD    magnesium sulfate 2g/50 mL (PREMIX) infusion, 2 g, Intravenous, Q2H, Luis A Melgar MD, 2 g at 11/08/24 1021    mupirocin (BACTROBAN) 2 % nasal ointment 1 Application, 1 Application, Each Nare, BID, Anabela Edmond APRN, 1 Application at 11/08/24 0833    nitroglycerin (NITROSTAT) SL tablet 0.4 mg, 0.4 mg, Sublingual, Q5 Min PRN, Anabela Edmond APRN    octreotide (sandoSTATIN) 500 mcg in sodium chloride 0.9 % 100 mL (5 mcg/mL) infusion, 25 mcg/hr, Intravenous, Continuous, Mel Gentile MD, Last Rate: 5 mL/hr at 11/08/24 0650, 25 mcg/hr at 11/08/24 0650    ondansetron (ZOFRAN) injection 4 mg, 4 mg, Intravenous, Q6H PRN, Anabela Edmond APRN    pantoprazole (PROTONIX) injection 40 mg, 40 mg, Intravenous, Q12H, Yony Brooks MD, 40 mg at 11/08/24 0834    [COMPLETED] Insert Peripheral IV, , , Once **AND** sodium chloride 0.9 % flush 10 mL, 10 mL, Intravenous, PRN, Mel Gentile MD    sodium chloride 0.9 % flush 10 mL, 10 mL, Intravenous, Q12H, Anabela Edmond APRN, 10 mL at 11/08/24 1012    thiamine (B-1) injection 200 mg, 200 mg, Intravenous, Q8H, 200 mg at 11/08/24 0527 **FOLLOWED BY** [START ON 11/12/2024] thiamine (VITAMIN B-1) tablet 100 mg, 100 mg, Oral, Daily, Luis A Melgar MD  Review of Systems:    There is weakness and fatigue all other systems reviewed and negative    Objective     Vital Signs  Temp:  [97.7 °F (36.5 °C)-98.3 °F (36.8 °C)] 98.2 °F (36.8 °C)  Heart Rate:  [] 90  Resp:  [14-18] 14  BP: (103-150)/() 138/98  FiO2 (%):  [100 %] 100 %  Body mass index is 21.72 kg/m².    Intake/Output Summary (Last 24 hours) at 11/8/2024 1125  Last data filed at 11/8/2024 0650  Gross per 24 hour   Intake 3288.51 ml   Output 1750 ml   Net 1538.51 ml     No intake/output data recorded.     Physical  Exam:   General: patient awake, alert and cooperative   Eyes: Normal lids and lashes, no scleral icterus   Neck: supple, normal ROM   Skin: warm and dry, not jaundiced   Cardiovascular: regular rhythm and rate, no murmurs auscultated   Pulm: clear to auscultation bilaterally, regular and unlabored   Abdomen: soft, nontender, nondistended; normal bowel sounds   Extremities: no rash or edema   Psychiatric: Normal mood and behavior; memory intact     Results Review:     I reviewed the patient's new clinical results.    Results from last 7 days   Lab Units 11/08/24 0314 11/07/24 2137 11/07/24  1447 11/07/24 0628   WBC 10*3/mm3 3.43 2.85*  --  4.46   HEMOGLOBIN g/dL 9.5* 9.1* 6.3* 6.2*   HEMATOCRIT % 28.4* 26.4* 22.0* 19.4*   PLATELETS 10*3/mm3 61* 61*  --  45*     Results from last 7 days   Lab Units 11/08/24 0314 11/06/24  2301   SODIUM mmol/L 137 136   POTASSIUM mmol/L 2.8* 3.8   CHLORIDE mmol/L 103 100   CO2 mmol/L 22.0 23.8   BUN mg/dL 5* 6   CREATININE mg/dL 0.43* 0.51*   CALCIUM mg/dL 7.8* 9.0   BILIRUBIN mg/dL  --  3.7*   ALK PHOS U/L  --  240*   ALT (SGPT) U/L  --  21   AST (SGOT) U/L  --  95*   GLUCOSE mg/dL 116* 89     Results from last 7 days   Lab Units 11/08/24 0314 11/07/24  0628 11/06/24  2301   INR  2.30* 2.08* 1.79*     Lab Results   Lab Value Date/Time    LIPASE 15 11/06/2024 2301    LIPASE 43 12/03/2016 0657    LIPASE 51 12/02/2016 0647    LIPASE 62 (H) 12/01/2016 0753    LIPASE 87 (H) 11/30/2016 1612       Radiology:  CT Head Without Contrast   Final Result         Electronically signed by Katie Avila MD on 11-07-24 at 0037      CT Abdomen Pelvis With Contrast   Final Result         Electronically signed by Katie Avila MD on 11-07-24 at 0039      XR Chest 1 View   Final Result         Electronically signed by Katie Avila MD on 11-06-24 at 2340          Assessment & Plan     Active Hospital Problems    Diagnosis     **Alcohol abuse with withdrawal     History of esophageal varices     Hematemesis         Assessment:  Cirrhosis  Variceal bleed      Plan:  Follow hemoglobin  Octreotide drip  Alcohol cessation lifelong  Repeat EGD 4 weeks  Continue antibiotics  I discussed the patients findings and my recommendations with patient and nursing staff.    Yony Brooks MD

## 2024-11-08 NOTE — CASE MANAGEMENT/SOCIAL WORK
Continued Stay Note  [facility]     Patient Name: Corinna Almonte  MRN: [id number]  Today's Date: 11/8/2024    Admit Date: 11/6/2024    Plan: Home with spouse   Discharge Plan       Row Name 11/08/24 1247       Plan    Plan Home with spouse    Plan Comments CCP left HIPPA compliant voicemail for patient's spouse. CCP called to updated [facility] not [facility]. Kathy CARRANZA                   Discharge Codes    No documentation.                 Expected Discharge Date and Time       Expected Discharge Date Expected Discharge Time    Nov 9, 2024               DILLON Magaña

## 2024-11-08 NOTE — PLAN OF CARE
Goal Outcome Evaluation:  Plan of Care Reviewed With: patient        Progress: no change        Patient scoring high on CIWA overnight warranting frequent Ativan administrations and restraints being applied due to agitation and attempts to get out of bed. Bed alarm remained in place and armed throughout the night. VSS. Patient restarted on D10 infusion due to hypoglycemia at beginning of shift. Octreotide remains infusing. No signs of bleeding overnight. Hemoglobin stable. Magnesium currently being replaced per protocol.       Problem: Adult Inpatient Plan of Care  Goal: Plan of Care Review  Outcome: Progressing  Flowsheets (Taken 11/8/2024 0613)  Progress: no change  Plan of Care Reviewed With: patient  Goal: Patient-Specific Goal (Individualized)  Outcome: Progressing  Goal: Absence of Hospital-Acquired Illness or Injury  Outcome: Progressing  Intervention: Identify and Manage Fall Risk  Recent Flowsheet Documentation  Taken 11/8/2024 0600 by Tremayne Ohara II RN  Safety Promotion/Fall Prevention:   activity supervised   safety round/check completed   assistive device/personal items within reach   clutter free environment maintained   fall prevention program maintained   muscle strengthening facilitated   nonskid shoes/slippers when out of bed   room organization consistent  Taken 11/8/2024 0400 by Tremayne Ohara II, RN  Safety Promotion/Fall Prevention:   activity supervised   safety round/check completed   assistive device/personal items within reach   clutter free environment maintained   fall prevention program maintained   muscle strengthening facilitated   nonskid shoes/slippers when out of bed   room organization consistent  Taken 11/8/2024 0200 by Tremayne Ohara II RN  Safety Promotion/Fall Prevention:   activity supervised   safety round/check completed   assistive device/personal items within reach   clutter free environment maintained   fall prevention program maintained   muscle strengthening  facilitated   nonskid shoes/slippers when out of bed   room organization consistent  Taken 11/8/2024 0000 by Tremayne Ohara II, RN  Safety Promotion/Fall Prevention:   activity supervised   safety round/check completed   assistive device/personal items within reach   clutter free environment maintained   fall prevention program maintained   muscle strengthening facilitated   nonskid shoes/slippers when out of bed   room organization consistent  Taken 11/7/2024 2200 by Tremayne Ohara II, RN  Safety Promotion/Fall Prevention:   activity supervised   safety round/check completed   assistive device/personal items within reach   clutter free environment maintained   fall prevention program maintained   muscle strengthening facilitated   nonskid shoes/slippers when out of bed   room organization consistent  Taken 11/7/2024 2000 by Tremayne Ohara II, RN  Safety Promotion/Fall Prevention:   activity supervised   safety round/check completed   assistive device/personal items within reach   clutter free environment maintained   fall prevention program maintained   muscle strengthening facilitated   nonskid shoes/slippers when out of bed   room organization consistent  Intervention: Prevent Skin Injury  Recent Flowsheet Documentation  Taken 11/8/2024 0600 by Tremayne Ohara II, RN  Body Position: position changed independently  Taken 11/8/2024 0400 by Tremayne Ohara II, RN  Body Position:   position changed independently   right   turned   upper extremity elevated   weight shifting   heels elevated  Taken 11/8/2024 0200 by Tremayne Ohara II, RN  Body Position:   position changed independently   turned   left   weight shifting   heels elevated  Taken 11/8/2024 0000 by Tremayne Ohara II, RN  Body Position:   position changed independently   weight shifting   turned   right  Taken 11/7/2024 2200 by Tremayne Ohara II, RN  Body Position:   position changed independently   turned   weight shifting  Taken 11/7/2024 2000 by Tremayne Ohara II  RN  Body Position:   position changed independently   turned   weight shifting  Skin Protection:   transparent dressing maintained   pulse oximeter probe site changed   incontinence pads utilized  Intervention: Prevent and Manage VTE (Venous Thromboembolism) Risk  Recent Flowsheet Documentation  Taken 11/7/2024 2000 by Tremayne Ohara II, RN  VTE Prevention/Management: patient refused intervention  Intervention: Prevent Infection  Recent Flowsheet Documentation  Taken 11/8/2024 0600 by Tremayne Ohara II, RN  Infection Prevention:   personal protective equipment utilized   hand hygiene promoted   rest/sleep promoted   single patient room provided  Taken 11/8/2024 0400 by Tremayne Ohara II, RN  Infection Prevention:   personal protective equipment utilized   hand hygiene promoted   rest/sleep promoted   single patient room provided  Taken 11/8/2024 0200 by Tremayne Ohara II, RN  Infection Prevention:   personal protective equipment utilized   hand hygiene promoted   rest/sleep promoted   single patient room provided  Taken 11/8/2024 0000 by Tremayne Ohara II, RN  Infection Prevention:   personal protective equipment utilized   hand hygiene promoted   rest/sleep promoted   single patient room provided  Taken 11/7/2024 2200 by Tremayne Ohara II, RN  Infection Prevention:   personal protective equipment utilized   hand hygiene promoted   rest/sleep promoted   single patient room provided  Taken 11/7/2024 2000 by Tremayne Ohara II, RN  Infection Prevention:   personal protective equipment utilized   hand hygiene promoted   rest/sleep promoted   single patient room provided  Goal: Optimal Comfort and Wellbeing  Outcome: Progressing  Intervention: Provide Person-Centered Care  Recent Flowsheet Documentation  Taken 11/8/2024 0000 by Tremayne Ohara II, RN  Trust Relationship/Rapport:   care explained   thoughts/feelings acknowledged  Taken 11/7/2024 2000 by Tremayne Ohara II, RN  Trust Relationship/Rapport:   care explained   choices  provided   emotional support provided   empathic listening provided   questions answered   questions encouraged   reassurance provided   thoughts/feelings acknowledged  Goal: Readiness for Transition of Care  Outcome: Progressing     Problem: Fall Injury Risk  Goal: Absence of Fall and Fall-Related Injury  Outcome: Progressing  Intervention: Identify and Manage Contributors  Recent Flowsheet Documentation  Taken 11/7/2024 2000 by Tremayne Ohara II RN  Medication Review/Management:   medications reviewed   high-risk medications identified  Intervention: Promote Injury-Free Environment  Recent Flowsheet Documentation  Taken 11/8/2024 0600 by Tremayne Ohara II RN  Safety Promotion/Fall Prevention:   activity supervised   safety round/check completed   assistive device/personal items within reach   clutter free environment maintained   fall prevention program maintained   muscle strengthening facilitated   nonskid shoes/slippers when out of bed   room organization consistent  Taken 11/8/2024 0400 by Tremayne Ohara II, RN  Safety Promotion/Fall Prevention:   activity supervised   safety round/check completed   assistive device/personal items within reach   clutter free environment maintained   fall prevention program maintained   muscle strengthening facilitated   nonskid shoes/slippers when out of bed   room organization consistent  Taken 11/8/2024 0200 by Tremayne Ohara II, RN  Safety Promotion/Fall Prevention:   activity supervised   safety round/check completed   assistive device/personal items within reach   clutter free environment maintained   fall prevention program maintained   muscle strengthening facilitated   nonskid shoes/slippers when out of bed   room organization consistent  Taken 11/8/2024 0000 by Tremayne Ohara II RN  Safety Promotion/Fall Prevention:   activity supervised   safety round/check completed   assistive device/personal items within reach   clutter free environment maintained   fall prevention  program maintained   muscle strengthening facilitated   nonskid shoes/slippers when out of bed   room organization consistent  Taken 11/7/2024 2200 by Tremayne Ohara II, RN  Safety Promotion/Fall Prevention:   activity supervised   safety round/check completed   assistive device/personal items within reach   clutter free environment maintained   fall prevention program maintained   muscle strengthening facilitated   nonskid shoes/slippers when out of bed   room organization consistent  Taken 11/7/2024 2000 by Tremayne Ohara II, RN  Safety Promotion/Fall Prevention:   activity supervised   safety round/check completed   assistive device/personal items within reach   clutter free environment maintained   fall prevention program maintained   muscle strengthening facilitated   nonskid shoes/slippers when out of bed   room organization consistent     Problem: Skin Injury Risk Increased  Goal: Skin Health and Integrity  Outcome: Progressing  Intervention: Optimize Skin Protection  Recent Flowsheet Documentation  Taken 11/8/2024 0600 by Tremayne Ohara II, RN  Head of Bed (HOB) Positioning: HOB lowered  Taken 11/8/2024 0400 by Tremayne Ohara II, RN  Head of Bed (HOB) Positioning: HOB at 30 degrees  Taken 11/8/2024 0200 by Tremayne Ohara II, RN  Head of Bed (HOB) Positioning: HOB at 30 degrees  Taken 11/8/2024 0000 by Tremayne Ohara II, RN  Head of Bed (HOB) Positioning: HOB at 30-45 degrees  Taken 11/7/2024 2200 by Tremayne Ohara II, RN  Head of Bed (HOB) Positioning: HOB at 30-45 degrees  Taken 11/7/2024 2000 by Tremayne Ohara II, RN  Activity Management: activity encouraged  Pressure Reduction Techniques:   frequent weight shift encouraged   weight shift assistance provided  Head of Bed (HOB) Positioning: HOB at 30-45 degrees  Pressure Reduction Devices: specialty bed utilized  Skin Protection:   transparent dressing maintained   pulse oximeter probe site changed   incontinence pads utilized     Problem: Restraint,  Nonviolent  Goal: Absence of Harm or Injury  Outcome: Progressing  Intervention: Implement Least Restrictive Safety Strategies  Recent Flowsheet Documentation  Taken 11/8/2024 0600 by Tremayne Ohara II, RN  Medical Device Protection:   tubing secured   IV pole/bag removed from visual field   torso covered  Diversional Activities: television  Taken 11/8/2024 0500 by Tremayne Ohara II, RN  Medical Device Protection:   torso covered   tubing secured   IV pole/bag removed from visual field  Diversional Activities: television  Intervention: Protect Dignity, Rights and Personal Wellbeing  Recent Flowsheet Documentation  Taken 11/8/2024 0000 by Tremayne Ohara II, RN  Trust Relationship/Rapport:   care explained   thoughts/feelings acknowledged  Taken 11/7/2024 2000 by Tremayne Ohara II, RN  Trust Relationship/Rapport:   care explained   choices provided   emotional support provided   empathic listening provided   questions answered   questions encouraged   reassurance provided   thoughts/feelings acknowledged  Intervention: Protect Skin and Joint Integrity  Recent Flowsheet Documentation  Taken 11/8/2024 0600 by Tremayne Ohara II, RN  Body Position: position changed independently  Taken 11/8/2024 0400 by Tremayne Ohara II, RN  Body Position:   position changed independently   right   turned   upper extremity elevated   weight shifting   heels elevated  Taken 11/8/2024 0200 by Tremayne Ohara II, RN  Body Position:   position changed independently   turned   left   weight shifting   heels elevated  Taken 11/8/2024 0000 by Tremayne Ohara II, RN  Body Position:   position changed independently   weight shifting   turned   right  Taken 11/7/2024 2200 by Tremayne Ohara II, RN  Body Position:   position changed independently   turned   weight shifting  Taken 11/7/2024 2000 by Tremayne Ohara II, RN  Body Position:   position changed independently   turned   weight shifting  Skin Protection:   transparent dressing maintained   pulse oximeter  probe site changed   incontinence pads utilized

## 2024-11-09 LAB
ALBUMIN SERPL-MCNC: 3.1 G/DL (ref 3.5–5.2)
ALBUMIN/GLOB SERPL: 0.9 G/DL
ALP SERPL-CCNC: 173 U/L (ref 39–117)
ALT SERPL W P-5'-P-CCNC: 21 U/L (ref 1–33)
ANION GAP SERPL CALCULATED.3IONS-SCNC: 11 MMOL/L (ref 5–15)
ANION GAP SERPL CALCULATED.3IONS-SCNC: 9 MMOL/L (ref 5–15)
APTT PPP: 44.2 SECONDS (ref 22.7–35.4)
AST SERPL-CCNC: 87 U/L (ref 1–32)
BASOPHILS # BLD AUTO: 0.01 10*3/MM3 (ref 0–0.2)
BASOPHILS NFR BLD AUTO: 0.2 % (ref 0–1.5)
BILIRUB SERPL-MCNC: 5 MG/DL (ref 0–1.2)
BUN SERPL-MCNC: 5 MG/DL (ref 6–20)
BUN SERPL-MCNC: 6 MG/DL (ref 6–20)
BUN/CREAT SERPL: 10.9 (ref 7–25)
BUN/CREAT SERPL: 13.6 (ref 7–25)
CALCIUM SPEC-SCNC: 7.7 MG/DL (ref 8.6–10.5)
CALCIUM SPEC-SCNC: 7.9 MG/DL (ref 8.6–10.5)
CHLORIDE SERPL-SCNC: 101 MMOL/L (ref 98–107)
CHLORIDE SERPL-SCNC: 106 MMOL/L (ref 98–107)
CO2 SERPL-SCNC: 18 MMOL/L (ref 22–29)
CO2 SERPL-SCNC: 23 MMOL/L (ref 22–29)
CREAT SERPL-MCNC: 0.44 MG/DL (ref 0.57–1)
CREAT SERPL-MCNC: 0.46 MG/DL (ref 0.57–1)
DEPRECATED RDW RBC AUTO: 41.2 FL (ref 37–54)
DEPRECATED RDW RBC AUTO: 43.7 FL (ref 37–54)
DEPRECATED RDW RBC AUTO: 44.7 FL (ref 37–54)
EGFRCR SERPLBLD CKD-EPI 2021: 124.2 ML/MIN/1.73
EGFRCR SERPLBLD CKD-EPI 2021: 125.6 ML/MIN/1.73
EOSINOPHIL # BLD AUTO: 0.03 10*3/MM3 (ref 0–0.4)
EOSINOPHIL NFR BLD AUTO: 0.7 % (ref 0.3–6.2)
ERYTHROCYTE [DISTWIDTH] IN BLOOD BY AUTOMATED COUNT: 14.9 % (ref 12.3–15.4)
ERYTHROCYTE [DISTWIDTH] IN BLOOD BY AUTOMATED COUNT: 15.3 % (ref 12.3–15.4)
ERYTHROCYTE [DISTWIDTH] IN BLOOD BY AUTOMATED COUNT: 15.7 % (ref 12.3–15.4)
GLOBULIN UR ELPH-MCNC: 3.6 GM/DL
GLUCOSE BLDC GLUCOMTR-MCNC: 114 MG/DL (ref 70–130)
GLUCOSE BLDC GLUCOMTR-MCNC: 137 MG/DL (ref 70–130)
GLUCOSE BLDC GLUCOMTR-MCNC: 284 MG/DL (ref 70–130)
GLUCOSE BLDC GLUCOMTR-MCNC: 70 MG/DL (ref 70–130)
GLUCOSE BLDC GLUCOMTR-MCNC: 87 MG/DL (ref 70–130)
GLUCOSE SERPL-MCNC: 112 MG/DL (ref 65–99)
GLUCOSE SERPL-MCNC: 202 MG/DL (ref 65–99)
HCT VFR BLD AUTO: 28 % (ref 34–46.6)
HCT VFR BLD AUTO: 28 % (ref 34–46.6)
HCT VFR BLD AUTO: 30.1 % (ref 34–46.6)
HGB BLD-MCNC: 9.6 G/DL (ref 12–15.9)
HGB BLD-MCNC: 9.7 G/DL (ref 12–15.9)
HGB BLD-MCNC: 9.8 G/DL (ref 12–15.9)
IMM GRANULOCYTES # BLD AUTO: 0.02 10*3/MM3 (ref 0–0.05)
IMM GRANULOCYTES NFR BLD AUTO: 0.5 % (ref 0–0.5)
INR PPP: 2.41 (ref 0.9–1.1)
LYMPHOCYTES # BLD AUTO: 0.76 10*3/MM3 (ref 0.7–3.1)
LYMPHOCYTES NFR BLD AUTO: 17.2 % (ref 19.6–45.3)
MAGNESIUM SERPL-MCNC: 1.9 MG/DL (ref 1.6–2.6)
MCH RBC QN AUTO: 26.1 PG (ref 26.6–33)
MCH RBC QN AUTO: 26.8 PG (ref 26.6–33)
MCH RBC QN AUTO: 27.4 PG (ref 26.6–33)
MCHC RBC AUTO-ENTMCNC: 32.6 G/DL (ref 31.5–35.7)
MCHC RBC AUTO-ENTMCNC: 34.3 G/DL (ref 31.5–35.7)
MCHC RBC AUTO-ENTMCNC: 34.6 G/DL (ref 31.5–35.7)
MCV RBC AUTO: 77.3 FL (ref 79–97)
MCV RBC AUTO: 79.8 FL (ref 79–97)
MCV RBC AUTO: 80.3 FL (ref 79–97)
MONOCYTES # BLD AUTO: 0.35 10*3/MM3 (ref 0.1–0.9)
MONOCYTES NFR BLD AUTO: 7.9 % (ref 5–12)
NEUTROPHILS NFR BLD AUTO: 3.26 10*3/MM3 (ref 1.7–7)
NEUTROPHILS NFR BLD AUTO: 73.5 % (ref 42.7–76)
PHOSPHATE SERPL-MCNC: 2.1 MG/DL (ref 2.5–4.5)
PHOSPHATE SERPL-MCNC: 3.1 MG/DL (ref 2.5–4.5)
PLATELET # BLD AUTO: 83 10*3/MM3 (ref 140–450)
PLATELET # BLD AUTO: 84 10*3/MM3 (ref 140–450)
PLATELET # BLD AUTO: 93 10*3/MM3 (ref 140–450)
PMV BLD AUTO: 10.1 FL (ref 6–12)
PMV BLD AUTO: 10.3 FL (ref 6–12)
PMV BLD AUTO: 9.9 FL (ref 6–12)
POTASSIUM SERPL-SCNC: 3.6 MMOL/L (ref 3.5–5.2)
POTASSIUM SERPL-SCNC: 4.4 MMOL/L (ref 3.5–5.2)
POTASSIUM SERPL-SCNC: 4.5 MMOL/L (ref 3.5–5.2)
PROT SERPL-MCNC: 6.7 G/DL (ref 6–8.5)
PROTHROMBIN TIME: 26.5 SECONDS (ref 11.7–14.2)
RBC # BLD AUTO: 3.51 10*6/MM3 (ref 3.77–5.28)
RBC # BLD AUTO: 3.62 10*6/MM3 (ref 3.77–5.28)
RBC # BLD AUTO: 3.75 10*6/MM3 (ref 3.77–5.28)
SODIUM SERPL-SCNC: 133 MMOL/L (ref 136–145)
SODIUM SERPL-SCNC: 135 MMOL/L (ref 136–145)
WBC NRBC COR # BLD AUTO: 4.35 10*3/MM3 (ref 3.4–10.8)
WBC NRBC COR # BLD AUTO: 4.43 10*3/MM3 (ref 3.4–10.8)
WBC NRBC COR # BLD AUTO: 5.41 10*3/MM3 (ref 3.4–10.8)

## 2024-11-09 PROCEDURE — 25010000002 CEFTRIAXONE PER 250 MG

## 2024-11-09 PROCEDURE — 85730 THROMBOPLASTIN TIME PARTIAL: CPT

## 2024-11-09 PROCEDURE — 25010000002 THIAMINE HCL 200 MG/2ML SOLUTION: Performed by: HOSPITALIST

## 2024-11-09 PROCEDURE — 25010000002 LORAZEPAM PER 2 MG: Performed by: HOSPITALIST

## 2024-11-09 PROCEDURE — 83735 ASSAY OF MAGNESIUM: CPT | Performed by: HOSPITALIST

## 2024-11-09 PROCEDURE — 85025 COMPLETE CBC W/AUTO DIFF WBC: CPT | Performed by: HOSPITALIST

## 2024-11-09 PROCEDURE — 82948 REAGENT STRIP/BLOOD GLUCOSE: CPT

## 2024-11-09 PROCEDURE — 63710000001 INSULIN LISPRO (HUMAN) PER 5 UNITS

## 2024-11-09 PROCEDURE — 25810000003 SODIUM CHLORIDE 0.9 % SOLUTION

## 2024-11-09 PROCEDURE — 80053 COMPREHEN METABOLIC PANEL: CPT | Performed by: HOSPITALIST

## 2024-11-09 PROCEDURE — 84132 ASSAY OF SERUM POTASSIUM: CPT

## 2024-11-09 PROCEDURE — 84100 ASSAY OF PHOSPHORUS: CPT | Performed by: HOSPITALIST

## 2024-11-09 PROCEDURE — 85610 PROTHROMBIN TIME: CPT

## 2024-11-09 PROCEDURE — 25010000002 OCTREOTIDE PER 25 MCG: Performed by: EMERGENCY MEDICINE

## 2024-11-09 PROCEDURE — 85027 COMPLETE CBC AUTOMATED: CPT | Performed by: INTERNAL MEDICINE

## 2024-11-09 PROCEDURE — 84100 ASSAY OF PHOSPHORUS: CPT

## 2024-11-09 RX ORDER — DEXTROSE MONOHYDRATE 25 G/50ML
25 INJECTION, SOLUTION INTRAVENOUS
Status: DISCONTINUED | OUTPATIENT
Start: 2024-11-09 | End: 2024-11-13 | Stop reason: HOSPADM

## 2024-11-09 RX ORDER — LACTULOSE 10 G/15ML
10 SOLUTION ORAL 3 TIMES DAILY
Status: DISCONTINUED | OUTPATIENT
Start: 2024-11-09 | End: 2024-11-13 | Stop reason: HOSPADM

## 2024-11-09 RX ORDER — IBUPROFEN 600 MG/1
1 TABLET ORAL
Status: DISCONTINUED | OUTPATIENT
Start: 2024-11-09 | End: 2024-11-13 | Stop reason: HOSPADM

## 2024-11-09 RX ORDER — FENTANYL/ROPIVACAINE/NS/PF 2-625MCG/1
15 PLASTIC BAG, INJECTION (ML) EPIDURAL ONCE
Status: COMPLETED | OUTPATIENT
Start: 2024-11-09 | End: 2024-11-09

## 2024-11-09 RX ORDER — INSULIN LISPRO 100 [IU]/ML
2-7 INJECTION, SOLUTION INTRAVENOUS; SUBCUTANEOUS
Status: DISCONTINUED | OUTPATIENT
Start: 2024-11-09 | End: 2024-11-13 | Stop reason: HOSPADM

## 2024-11-09 RX ORDER — NICOTINE POLACRILEX 4 MG
15 LOZENGE BUCCAL
Status: DISCONTINUED | OUTPATIENT
Start: 2024-11-09 | End: 2024-11-13 | Stop reason: HOSPADM

## 2024-11-09 RX ORDER — POTASSIUM CHLORIDE 1.5 G/1.58G
40 POWDER, FOR SOLUTION ORAL EVERY 4 HOURS
Status: COMPLETED | OUTPATIENT
Start: 2024-11-09 | End: 2024-11-09

## 2024-11-09 RX ADMIN — LORAZEPAM 1 MG: 2 INJECTION, SOLUTION INTRAMUSCULAR; INTRAVENOUS at 21:44

## 2024-11-09 RX ADMIN — THIAMINE HYDROCHLORIDE 200 MG: 100 INJECTION, SOLUTION INTRAMUSCULAR; INTRAVENOUS at 15:57

## 2024-11-09 RX ADMIN — INSULIN LISPRO 4 UNITS: 100 INJECTION, SOLUTION INTRAVENOUS; SUBCUTANEOUS at 02:32

## 2024-11-09 RX ADMIN — CEFTRIAXONE SODIUM 1000 MG: 1 INJECTION, POWDER, FOR SOLUTION INTRAMUSCULAR; INTRAVENOUS at 23:52

## 2024-11-09 RX ADMIN — LACTULOSE 10 G: 10 SOLUTION ORAL at 21:46

## 2024-11-09 RX ADMIN — OCTREOTIDE ACETATE 25 MCG/HR: 500 INJECTION, SOLUTION INTRAVENOUS; SUBCUTANEOUS at 15:58

## 2024-11-09 RX ADMIN — POTASSIUM CHLORIDE 40 MEQ: 1.5 FOR SOLUTION ORAL at 06:19

## 2024-11-09 RX ADMIN — RIFAXIMIN 550 MG: 550 TABLET ORAL at 21:46

## 2024-11-09 RX ADMIN — POTASSIUM CHLORIDE 40 MEQ: 1.5 FOR SOLUTION ORAL at 10:01

## 2024-11-09 RX ADMIN — THIAMINE HYDROCHLORIDE 200 MG: 100 INJECTION, SOLUTION INTRAMUSCULAR; INTRAVENOUS at 06:04

## 2024-11-09 RX ADMIN — POTASSIUM PHOSPHATE, MONOBASIC AND POTASSIUM PHOSPHATE, DIBASIC 15 MMOL: 224; 236 INJECTION, SOLUTION, CONCENTRATE INTRAVENOUS at 09:00

## 2024-11-09 RX ADMIN — LORAZEPAM 2 MG: 2 INJECTION INTRAMUSCULAR; INTRAVENOUS at 02:32

## 2024-11-09 RX ADMIN — LORAZEPAM 2 MG: 2 INJECTION INTRAMUSCULAR; INTRAVENOUS at 01:32

## 2024-11-09 RX ADMIN — PANTOPRAZOLE SODIUM 40 MG: 40 INJECTION, POWDER, FOR SOLUTION INTRAVENOUS at 09:59

## 2024-11-09 RX ADMIN — LORAZEPAM 2 MG: 2 INJECTION INTRAMUSCULAR; INTRAVENOUS at 03:38

## 2024-11-09 RX ADMIN — Medication 10 ML: at 09:58

## 2024-11-09 RX ADMIN — FOLIC ACID 1 MG: 5 INJECTION, SOLUTION INTRAMUSCULAR; INTRAVENOUS; SUBCUTANEOUS at 09:00

## 2024-11-09 RX ADMIN — Medication 10 ML: at 21:44

## 2024-11-09 RX ADMIN — LORAZEPAM 1 MG: 2 INJECTION, SOLUTION INTRAMUSCULAR; INTRAVENOUS at 09:59

## 2024-11-09 RX ADMIN — THIAMINE HYDROCHLORIDE 200 MG: 100 INJECTION, SOLUTION INTRAMUSCULAR; INTRAVENOUS at 21:43

## 2024-11-09 RX ADMIN — PANTOPRAZOLE SODIUM 40 MG: 40 INJECTION, POWDER, FOR SOLUTION INTRAVENOUS at 21:43

## 2024-11-09 RX ADMIN — LORAZEPAM 1 MG: 2 INJECTION, SOLUTION INTRAMUSCULAR; INTRAVENOUS at 15:58

## 2024-11-09 NOTE — PROGRESS NOTES
[facility]  Inpatient Progress Note    Reason for Follow Up: Cirrhosis, GI bleed    Subjective     Interval History:   -No new complaints, still in restraints, sleepy and drowsy, spoke with . Has been having issues for a few years.     Current Facility-Administered Medications:     acetaminophen (TYLENOL) tablet 650 mg, 650 mg, Oral, Once, Anabela Edmond APRN    sennosides-docusate (PERICOLACE) 8.6-50 MG per tablet 2 tablet, 2 tablet, Oral, BID **AND** polyethylene glycol (MIRALAX) packet 17 g, 17 g, Oral, Daily PRN **AND** bisacodyl (DULCOLAX) EC tablet 5 mg, 5 mg, Oral, Daily PRN **AND** bisacodyl (DULCOLAX) suppository 10 mg, 10 mg, Rectal, Daily PRN, Anabela Edmond APRN    Calcium Replacement - Follow Nurse / BPA Driven Protocol, , Does not apply, PRN, Jase Irvin MD    cefTRIAXone (ROCEPHIN) 1,000 mg in sodium chloride 0.9 % 100 mL MBP, 1,000 mg, Intravenous, Q24H, Anabela Edmond APRN, Last Rate: 200 mL/hr at 24 2357, 1,000 mg at 24 2357    dextrose (D50W) (25 g/50 mL) IV injection 25 g, 25 g, Intravenous, PRN, Mel Gentile MD, 25 g at 24 1901    dextrose (D50W) (25 g/50 mL) IV injection 25 g, 25 g, Intravenous, Q15 Min PRN, Antoinette Yates APRN    dextrose (GLUTOSE) oral gel 15 g, 15 g, Oral, Q15 Min PRN, Antoinette Yates APRN    folic acid 1 mg in sodium chloride 0.9 % 50 mL IVPB, 1 mg, Intravenous, Daily, Luis A Melgar MD, Last Rate: 100 mL/hr at 24 0900, 1 mg at 24 0900    glucagon (GLUCAGEN) injection 1 mg, 1 mg, Intramuscular, Q15 Min PRN, Antoinette Yates APRN    insulin lispro (HUMALOG/ADMELOG) injection 2-7 Units, 2-7 Units, Subcutaneous, 4x Daily AC & at Bedtime, Antoinette Yates, VIRGEN, 4 Units at 24 0232    [] LORazepam (ATIVAN) injection 2 mg, 2 mg, Intravenous, Q6H, 2 mg at 24 **FOLLOWED BY** LORazepam (ATIVAN) injection 1 mg, 1 mg, Intravenous, Q6H, Luis A Melgar MD, 1 mg at  11/09/24 0959    LORazepam (ATIVAN) tablet 1 mg, 1 mg, Oral, Q1H PRN **OR** LORazepam (ATIVAN) injection 1 mg, 1 mg, Intravenous, Q1H PRN **OR** LORazepam (ATIVAN) tablet 2 mg, 2 mg, Oral, Q1H PRN **OR** LORazepam (ATIVAN) injection 2 mg, 2 mg, Intravenous, Q1H PRN, 2 mg at 11/09/24 0338 **OR** LORazepam (ATIVAN) injection 2 mg, 2 mg, Intravenous, Q15 Min PRN, 2 mg at 11/09/24 0232 **OR** LORazepam (ATIVAN) injection 2 mg, 2 mg, Intramuscular, Q15 Min PRN, 2 mg at 11/08/24 0216 **OR** LORazepam (ATIVAN) tablet 4 mg, 4 mg, Oral, Q1H PRN **OR** LORazepam (ATIVAN) injection 4 mg, 4 mg, Intravenous, Q1H PRN, Luis A Melgar MD    mupirocin (BACTROBAN) 2 % nasal ointment 1 Application, 1 Application, Each Nare, BID, Anabela Edmond APRN, 1 Application at 11/08/24 2127    nitroglycerin (NITROSTAT) SL tablet 0.4 mg, 0.4 mg, Sublingual, Q5 Min PRN, Anabela Edmond APRN    octreotide (sandoSTATIN) 500 mcg in sodium chloride 0.9 % 100 mL (5 mcg/mL) infusion, 25 mcg/hr, Intravenous, Continuous, Mel Gentile MD, Last Rate: 5 mL/hr at 11/08/24 1858, 25 mcg/hr at 11/08/24 1858    ondansetron (ZOFRAN) injection 4 mg, 4 mg, Intravenous, Q6H PRN, Anabela Edmond APRN    pantoprazole (PROTONIX) injection 40 mg, 40 mg, Intravenous, Q12H, Yony Brooks MD, 40 mg at 11/09/24 0959    Phosphorus Replacement - Follow Nurse / BPA Driven Protocol, , Does not apply, PRN, Jase Irvin MD    Potassium Replacement - Follow Nurse / BPA Driven Protocol, , Does not apply, Brandee VILLAFUERTE Scott P, MD    Potassium Replacement - Follow Nurse / BPA Driven Protocol, , Does not apply, Brandee VILLAFUERTE Scott P, MD    [COMPLETED] Insert Peripheral IV, , , Once **AND** sodium chloride 0.9 % flush 10 mL, 10 mL, Intravenous, PRN, Mel Gentile MD    sodium chloride 0.9 % flush 10 mL, 10 mL, Intravenous, Q12H, Anabela Edmond APRN, 10 mL at 11/09/24 0958    thiamine (B-1) injection 200 mg, 200 mg, Intravenous, Q8H, 200 mg at  11/09/24 0604 **FOLLOWED BY** [START ON 11/12/2024] thiamine (VITAMIN B-1) tablet 100 mg, 100 mg, Oral, Daily, Luis A Melgar MD  Review of Systems:    There is weakness and fatigue all other systems reviewed and negative    Objective     Vital Signs  Temp:  [97.5 °F (36.4 °C)-98.5 °F (36.9 °C)] 98.1 °F (36.7 °C)  Heart Rate:  [] 78  BP: ()/() 135/91  Body mass index is 21.72 kg/m².    Intake/Output Summary (Last 24 hours) at 11/9/2024 1545  Last data filed at 11/9/2024 1400  Gross per 24 hour   Intake 2924 ml   Output 3250 ml   Net -326 ml     I/O this shift:  In: 480 [P.O.:480]  Out: 1000 [Urine:1000]     Physical Exam:   General: patient awake, alert and cooperative   Eyes: Normal lids and lashes, no scleral icterus   Neck: supple, normal ROM   Skin: warm and dry, not jaundiced   Cardiovascular: regular rhythm and rate, no murmurs auscultated   Pulm: clear to auscultation bilaterally, regular and unlabored   Abdomen: soft, nontender, nondistended; normal bowel sounds   Extremities: no rash or edema   Psychiatric: Normal mood and behavior; memory intact     Results Review:     I reviewed the patient's new clinical results.    Results from last 7 days   Lab Units 11/09/24  1214 11/09/24  0341 11/08/24  2142   WBC 10*3/mm3 5.41 4.43 3.66   HEMOGLOBIN g/dL 9.8* 9.7* 9.6*   HEMATOCRIT % 30.1* 28.0* 27.7*   PLATELETS 10*3/mm3 84* 83* 74*     Results from last 7 days   Lab Units 11/09/24  1412 11/09/24  0341 11/08/24  0314 11/06/24  2301   SODIUM mmol/L  --  133* 137 136   POTASSIUM mmol/L 4.4 3.6 2.8* 3.8   CHLORIDE mmol/L  --  101 103 100   CO2 mmol/L  --  23.0 22.0 23.8   BUN mg/dL  --  5* 5* 6   CREATININE mg/dL  --  0.46* 0.43* 0.51*   CALCIUM mg/dL  --  7.7* 7.8* 9.0   BILIRUBIN mg/dL  --   --   --  3.7*   ALK PHOS U/L  --   --   --  240*   ALT (SGPT) U/L  --   --   --  21   AST (SGOT) U/L  --   --   --  95*   GLUCOSE mg/dL  --  202* 116* 89     Results from last 7 days   Lab Units  11/09/24  0341 11/08/24  0314 11/07/24  0628   INR  2.41* 2.30* 2.08*     Lab Results   Lab Value Date/Time    LIPASE 15 11/06/2024 2301    LIPASE 43 12/03/2016 0657    LIPASE 51 12/02/2016 0647    LIPASE 62 (H) 12/01/2016 0753    LIPASE 87 (H) 11/30/2016 1612       Radiology:  CT Head Without Contrast   Final Result         Electronically signed by Katie Avila MD on 11-07-24 at 0037      CT Abdomen Pelvis With Contrast   Final Result         Electronically signed by Katie Avila MD on 11-07-24 at 0039      XR Chest 1 View   Final Result         Electronically signed by Katie Avila MD on 11-06-24 at 2340          Assessment & Plan     Active Hospital Problems    Diagnosis     **Alcohol abuse with withdrawal     History of esophageal varices     Hematemesis        Assessment:    Decompensated etoh cirrhosis with bleeding varices  -Suspect acute hepatic encephalopathy  -Start Rifaximin  -Lactulose  -Small ascites    2. Esophageal varices with bleeding   -S/p Banding    -MELD 21, check daily CMP, acute on chronic liver failure    Plan: Start rifxamin, lactulose, likely decompesnation 2/2 to GI bleeding, hgb stable now. Overall poor prognosis, previous episodes and decompensation, pt continues to drink, unlikely transplant candidate. Recommend psychiatry consult when acute encephalopathy improves.      Caleb Hodges MD

## 2024-11-09 NOTE — PLAN OF CARE
Goal Outcome Evaluation:              Outcome Evaluation: Pt oriented to self only, CIWA anywhere from 6-20, 12mg total of Ativan given. Restraints remain. Octreotide continued. Will CTM.

## 2024-11-09 NOTE — NURSING NOTE
Access center note.    Spoke with primary RN. Attempted consult, patient confused, not appropriate, restrained. Access will re-attempt when patient able to participate in evaluation.

## 2024-11-09 NOTE — PROGRESS NOTES
Dr. ROSALINDA Lee    [facility]        Patient ID:  Name:  Corinna Almonte  MRN:  [id number]  1985  39 y.o.  female            CC/Reason for visit: Seizures    Interval hx: Still confused.  Awake, opens eyes, did not answer my questions but gave me a thumbs up when I asked.  Family at bedside.  Reviewed notes by all other medical providers.  Patient here for hepatic encephalopathy, alcohol abuse, GI bleed and seizures    ROS: Unobtainable due to confusion    I reviewed old medical records.  Past medical history, social history and family history: Unchanged from admission H&P.      Vitals:  Vitals:    11/09/24 0800 11/09/24 0900 11/09/24 1000 11/09/24 1100   BP: 123/87 118/83 99/84 123/86   Pulse: 87 92 84 92   Resp:       Temp:    98.1 °F (36.7 °C)   TempSrc:    Oral   SpO2:    96%   Weight:       Height:         FiO2 (%): 100 %     Body mass index is 21.72 kg/m².    Intake/Output Summary (Last 24 hours) at 11/9/2024 1332  Last data filed at 11/9/2024 0630  Gross per 24 hour   Intake 2444 ml   Output 2250 ml   Net 194 ml       Exam:  GEN:  No distress  Alert, oriented x 1, only spoke a few words and followed a few simple commands, still confused  LUNGS: Clear breath sounds bilat, no use of accessory muscles  CV:  Normal S1S2, without murmur, some ankle edema  ABD:  Non tender, no enlarged liver or masses      Scheduled meds:  acetaminophen, 650 mg, Oral, Once  cefTRIAXone, 1,000 mg, Intravenous, Q24H  folic acid 1 mg in sodium chloride 0.9 % 50 mL IVPB, 1 mg, Intravenous, Daily  insulin lispro, 2-7 Units, Subcutaneous, 4x Daily AC & at Bedtime  LORazepam, 1 mg, Intravenous, Q6H  mupirocin, 1 Application, Each Nare, BID  pantoprazole, 40 mg, Intravenous, Q12H  senna-docusate sodium, 2 tablet, Oral, BID  sodium chloride, 10 mL, Intravenous, Q12H  thiamine (B-1) IV, 200 mg, Intravenous, Q8H   Followed by  [START ON 11/12/2024] thiamine, 100 mg, Oral, Daily      IV meds:                       octreotide (SandoSTATIN) infusion, 25 mcg/hr, Last Rate: 25 mcg/hr (11/08/24 2362)        Data Review:   I reviewed the patient's medications and new clinical results.          Lab Results   Component Value Date    CALCIUM 7.7 (L) 11/09/2024    PHOS 2.1 (L) 11/09/2024    MG 1.9 11/09/2024    MG 2.4 11/08/2024    MG 1.4 (L) 11/08/2024     Results from last 7 days   Lab Units 11/09/24  1214 11/09/24  0341 11/08/24  2142 11/08/24  1309 11/08/24  0314 11/07/24  1447 11/07/24  0628 11/06/24  2301   SODIUM mmol/L  --  133*  --   --  137  --   --  136   POTASSIUM mmol/L  --  3.6  --   --  2.8*  --   --  3.8   CHLORIDE mmol/L  --  101  --   --  103  --   --  100   CO2 mmol/L  --  23.0  --   --  22.0  --   --  23.8   BUN mg/dL  --  5*  --   --  5*  --   --  6   CREATININE mg/dL  --  0.46*  --   --  0.43*  --   --  0.51*   CALCIUM mg/dL  --  7.7*  --   --  7.8*  --   --  9.0   BILIRUBIN mg/dL  --   --   --   --   --   --   --  3.7*   ALK PHOS U/L  --   --   --   --   --   --   --  240*   ALT (SGPT) U/L  --   --   --   --   --   --   --  21   AST (SGOT) U/L  --   --   --   --   --   --   --  95*   GLUCOSE mg/dL  --  202*  --   --  116*  --   --  89   WBC 10*3/mm3 5.41 4.43 3.66  --  3.43   < > 4.46 4.53   HEMOGLOBIN g/dL 9.8* 9.7* 9.6*   < > 9.5*   < > 6.2* 6.8*   PLATELETS 10*3/mm3 84* 83* 74*  --  61*   < > 45* 64*   INR   --  2.41*  --   --  2.30*  --  2.08* 1.79*   PROBNP pg/mL  --   --   --   --   --   --   --  315.0   PROCALCITONIN ng/mL  --   --   --   --   --   --   --  0.07    < > = values in this interval not displayed.     Results from last 7 days   Lab Units 11/06/24  2350   BLOODCX  No growth at 2 days  No growth at 2 days         Results from last 7 days   Lab Units 11/07/24  0355 11/06/24  2301   CK TOTAL U/L  --  320*   HSTROP T ng/L 25* 32*              ASSESSMENT:   Seizure x 2 with postictal state  Alcohol use disorder  Alcoholic liver cirrhosis  Vomiting  Epistaxis  Possible upper GI bleed  Acute blood  loss anemia  Thrombocytopenia  History of gastric ulcer  History of esophageal variceal bleeding with banding  History of abdominal ascites requiring paracentesis 2023  Diabetes mellitus 2, insulin-dependent with hypoglycemia  GERD  Chronic pancreatitis  Traumatic brain injury  PTSD  Anxiety and depression  Hypoglycemia  Elevated ammonia per GI  TSH mildly elevated check free T3 and free T4      PLAN:  Patient and all problems new to me.  Patient remains encephalopathic, confused.  Continue to monitor liver enzymes daily with labs.  Monitor closely for recurrent bleeding.  Check daily CBC.  GI following patient.  Absolutely needs to quit drinking alcohol indefinitely.  Replace electrolytes following protocol.  Continue to monitor INR and thrombocytopenia, both are abnormal due to chronic alcohol abuse.  Continue thiamine and folic acid.  Her mental status has improved somewhat.  If this worsens rapidly again, we will have to check her blood levels of ammonia and consider starting her on lactulose and rifaximin.    This patient has several chronic medical conditions, and now several acute medical illnesses.  Extensive amount of data was reviewed.  Images were directly visualized by me.  This patient warrants high complexity medical decision-making.  ]    I reviewed the chart and other providers notes and reviewed labs.  Copied text in this note has been reviewed and is accurate as of today      Phu Lee MD  11/9/2024

## 2024-11-10 LAB
ALBUMIN SERPL-MCNC: 3.3 G/DL (ref 3.5–5.2)
ALBUMIN/GLOB SERPL: 1 G/DL
ALP SERPL-CCNC: 171 U/L (ref 39–117)
ALT SERPL W P-5'-P-CCNC: 22 U/L (ref 1–33)
ANION GAP SERPL CALCULATED.3IONS-SCNC: 10 MMOL/L (ref 5–15)
AST SERPL-CCNC: 68 U/L (ref 1–32)
BASOPHILS # BLD AUTO: 0.03 10*3/MM3 (ref 0–0.2)
BASOPHILS NFR BLD AUTO: 0.7 % (ref 0–1.5)
BILIRUB SERPL-MCNC: 5 MG/DL (ref 0–1.2)
BUN SERPL-MCNC: 8 MG/DL (ref 6–20)
BUN/CREAT SERPL: 16.3 (ref 7–25)
CALCIUM SPEC-SCNC: 8.1 MG/DL (ref 8.6–10.5)
CHLORIDE SERPL-SCNC: 102 MMOL/L (ref 98–107)
CO2 SERPL-SCNC: 22 MMOL/L (ref 22–29)
CREAT SERPL-MCNC: 0.49 MG/DL (ref 0.57–1)
DEPRECATED RDW RBC AUTO: 44.2 FL (ref 37–54)
EGFRCR SERPLBLD CKD-EPI 2021: 122.4 ML/MIN/1.73
EOSINOPHIL # BLD AUTO: 0.04 10*3/MM3 (ref 0–0.4)
EOSINOPHIL NFR BLD AUTO: 1 % (ref 0.3–6.2)
ERYTHROCYTE [DISTWIDTH] IN BLOOD BY AUTOMATED COUNT: 15.5 % (ref 12.3–15.4)
GLOBULIN UR ELPH-MCNC: 3.4 GM/DL
GLUCOSE BLDC GLUCOMTR-MCNC: 121 MG/DL (ref 70–130)
GLUCOSE BLDC GLUCOMTR-MCNC: 166 MG/DL (ref 70–130)
GLUCOSE BLDC GLUCOMTR-MCNC: 221 MG/DL (ref 70–130)
GLUCOSE BLDC GLUCOMTR-MCNC: 249 MG/DL (ref 70–130)
GLUCOSE SERPL-MCNC: 243 MG/DL (ref 65–99)
HCT VFR BLD AUTO: 29.1 % (ref 34–46.6)
HGB BLD-MCNC: 9.4 G/DL (ref 12–15.9)
IMM GRANULOCYTES # BLD AUTO: 0.01 10*3/MM3 (ref 0–0.05)
IMM GRANULOCYTES NFR BLD AUTO: 0.2 % (ref 0–0.5)
LYMPHOCYTES # BLD AUTO: 1.02 10*3/MM3 (ref 0.7–3.1)
LYMPHOCYTES NFR BLD AUTO: 24.7 % (ref 19.6–45.3)
MAGNESIUM SERPL-MCNC: 1.7 MG/DL (ref 1.6–2.6)
MCH RBC QN AUTO: 25.9 PG (ref 26.6–33)
MCHC RBC AUTO-ENTMCNC: 32.3 G/DL (ref 31.5–35.7)
MCV RBC AUTO: 80.2 FL (ref 79–97)
MONOCYTES # BLD AUTO: 0.4 10*3/MM3 (ref 0.1–0.9)
MONOCYTES NFR BLD AUTO: 9.7 % (ref 5–12)
NEUTROPHILS NFR BLD AUTO: 2.63 10*3/MM3 (ref 1.7–7)
NEUTROPHILS NFR BLD AUTO: 63.7 % (ref 42.7–76)
PHOSPHATE SERPL-MCNC: 3.4 MG/DL (ref 2.5–4.5)
PLATELET # BLD AUTO: 97 10*3/MM3 (ref 140–450)
PMV BLD AUTO: 9.8 FL (ref 6–12)
POTASSIUM SERPL-SCNC: 4.2 MMOL/L (ref 3.5–5.2)
PROT SERPL-MCNC: 6.7 G/DL (ref 6–8.5)
RBC # BLD AUTO: 3.63 10*6/MM3 (ref 3.77–5.28)
SODIUM SERPL-SCNC: 134 MMOL/L (ref 136–145)
WBC NRBC COR # BLD AUTO: 4.13 10*3/MM3 (ref 3.4–10.8)

## 2024-11-10 PROCEDURE — 25010000002 THIAMINE HCL 200 MG/2ML SOLUTION: Performed by: HOSPITALIST

## 2024-11-10 PROCEDURE — 25010000002 OCTREOTIDE PER 25 MCG: Performed by: EMERGENCY MEDICINE

## 2024-11-10 PROCEDURE — 25010000002 LORAZEPAM PER 2 MG: Performed by: HOSPITALIST

## 2024-11-10 PROCEDURE — 85025 COMPLETE CBC W/AUTO DIFF WBC: CPT | Performed by: HOSPITALIST

## 2024-11-10 PROCEDURE — 63710000001 INSULIN LISPRO (HUMAN) PER 5 UNITS

## 2024-11-10 PROCEDURE — 82948 REAGENT STRIP/BLOOD GLUCOSE: CPT

## 2024-11-10 PROCEDURE — 83735 ASSAY OF MAGNESIUM: CPT | Performed by: HOSPITALIST

## 2024-11-10 PROCEDURE — 84100 ASSAY OF PHOSPHORUS: CPT | Performed by: HOSPITALIST

## 2024-11-10 PROCEDURE — 80053 COMPREHEN METABOLIC PANEL: CPT | Performed by: INTERNAL MEDICINE

## 2024-11-10 RX ADMIN — RIFAXIMIN 550 MG: 550 TABLET ORAL at 09:19

## 2024-11-10 RX ADMIN — THIAMINE HYDROCHLORIDE 200 MG: 100 INJECTION, SOLUTION INTRAMUSCULAR; INTRAVENOUS at 21:42

## 2024-11-10 RX ADMIN — PANTOPRAZOLE SODIUM 40 MG: 40 INJECTION, POWDER, FOR SOLUTION INTRAVENOUS at 09:19

## 2024-11-10 RX ADMIN — Medication 10 ML: at 09:19

## 2024-11-10 RX ADMIN — LACTULOSE 10 G: 10 SOLUTION ORAL at 16:49

## 2024-11-10 RX ADMIN — Medication 10 ML: at 21:43

## 2024-11-10 RX ADMIN — LACTULOSE 10 G: 10 SOLUTION ORAL at 09:19

## 2024-11-10 RX ADMIN — RIFAXIMIN 550 MG: 550 TABLET ORAL at 21:42

## 2024-11-10 RX ADMIN — SENNOSIDES AND DOCUSATE SODIUM 2 TABLET: 50; 8.6 TABLET ORAL at 09:19

## 2024-11-10 RX ADMIN — PANTOPRAZOLE SODIUM 40 MG: 40 INJECTION, POWDER, FOR SOLUTION INTRAVENOUS at 21:42

## 2024-11-10 RX ADMIN — INSULIN LISPRO 3 UNITS: 100 INJECTION, SOLUTION INTRAVENOUS; SUBCUTANEOUS at 09:19

## 2024-11-10 RX ADMIN — INSULIN LISPRO 2 UNITS: 100 INJECTION, SOLUTION INTRAVENOUS; SUBCUTANEOUS at 16:49

## 2024-11-10 RX ADMIN — THIAMINE HYDROCHLORIDE 200 MG: 100 INJECTION, SOLUTION INTRAMUSCULAR; INTRAVENOUS at 05:24

## 2024-11-10 RX ADMIN — OCTREOTIDE ACETATE 25 MCG/HR: 500 INJECTION, SOLUTION INTRAVENOUS; SUBCUTANEOUS at 14:30

## 2024-11-10 RX ADMIN — FOLIC ACID 1 MG: 5 INJECTION, SOLUTION INTRAMUSCULAR; INTRAVENOUS; SUBCUTANEOUS at 09:28

## 2024-11-10 RX ADMIN — LACTULOSE 10 G: 10 SOLUTION ORAL at 21:42

## 2024-11-10 RX ADMIN — LORAZEPAM 2 MG: 2 INJECTION INTRAMUSCULAR; INTRAVENOUS at 01:39

## 2024-11-10 RX ADMIN — THIAMINE HYDROCHLORIDE 200 MG: 100 INJECTION, SOLUTION INTRAMUSCULAR; INTRAVENOUS at 14:30

## 2024-11-10 RX ADMIN — INSULIN LISPRO 3 UNITS: 100 INJECTION, SOLUTION INTRAVENOUS; SUBCUTANEOUS at 21:43

## 2024-11-10 RX ADMIN — SENNOSIDES AND DOCUSATE SODIUM 2 TABLET: 50; 8.6 TABLET ORAL at 21:42

## 2024-11-10 NOTE — PLAN OF CARE
Problem: Fall Injury Risk  Goal: Absence of Fall and Fall-Related Injury  Outcome: Progressing  Intervention: Identify and Manage Contributors  Recent Flowsheet Documentation  Taken 11/10/2024 0600 by Annetta Partida RN  Medication Review/Management: medications reviewed  Taken 11/10/2024 0400 by Annetta Partida RN  Medication Review/Management: medications reviewed  Taken 11/10/2024 0200 by Annetta Partida RN  Medication Review/Management: medications reviewed  Taken 11/10/2024 0000 by Annetta Partida RN  Medication Review/Management: medications reviewed  Taken 11/9/2024 2200 by Annetta Partida RN  Medication Review/Management: medications reviewed  Taken 11/9/2024 2000 by Annetta Partida RN  Medication Review/Management: medications reviewed  Intervention: Promote Injury-Free Environment  Recent Flowsheet Documentation  Taken 11/10/2024 0600 by Annetta Partida RN  Safety Promotion/Fall Prevention:   activity supervised   lighting adjusted   nonskid shoes/slippers when out of bed   safety round/check completed   room organization consistent  Taken 11/10/2024 0400 by Annetta Partida RN  Safety Promotion/Fall Prevention:   activity supervised   lighting adjusted   nonskid shoes/slippers when out of bed   safety round/check completed   room organization consistent  Taken 11/10/2024 0200 by Annetta Partida RN  Safety Promotion/Fall Prevention:   activity supervised   lighting adjusted   nonskid shoes/slippers when out of bed   safety round/check completed   room organization consistent  Taken 11/10/2024 0000 by Annetta Partida RN  Safety Promotion/Fall Prevention:   activity supervised   lighting adjusted   nonskid shoes/slippers when out of bed   safety round/check completed   room organization consistent  Taken 11/9/2024 2200 by Annetta Partida RN  Safety Promotion/Fall Prevention:   activity supervised   lighting adjusted   nonskid shoes/slippers when out of bed   safety round/check completed   room  organization consistent  Taken 11/9/2024 2000 by Annetta Partida RN  Safety Promotion/Fall Prevention:   activity supervised   lighting adjusted   nonskid shoes/slippers when out of bed   safety round/check completed   room organization consistent     Problem: Adult Inpatient Plan of Care  Goal: Absence of Hospital-Acquired Illness or Injury  Intervention: Identify and Manage Fall Risk  Recent Flowsheet Documentation  Taken 11/10/2024 0600 by Annetta Partida RN  Safety Promotion/Fall Prevention:   activity supervised   lighting adjusted   nonskid shoes/slippers when out of bed   safety round/check completed   room organization consistent  Taken 11/10/2024 0400 by Annetta Partida RN  Safety Promotion/Fall Prevention:   activity supervised   lighting adjusted   nonskid shoes/slippers when out of bed   safety round/check completed   room organization consistent  Taken 11/10/2024 0200 by Annetta Partida RN  Safety Promotion/Fall Prevention:   activity supervised   lighting adjusted   nonskid shoes/slippers when out of bed   safety round/check completed   room organization consistent  Taken 11/10/2024 0000 by Annetta Partida RN  Safety Promotion/Fall Prevention:   activity supervised   lighting adjusted   nonskid shoes/slippers when out of bed   safety round/check completed   room organization consistent  Taken 11/9/2024 2200 by Annetta Partida RN  Safety Promotion/Fall Prevention:   activity supervised   lighting adjusted   nonskid shoes/slippers when out of bed   safety round/check completed   room organization consistent  Taken 11/9/2024 2000 by Annetta Partida RN  Safety Promotion/Fall Prevention:   activity supervised   lighting adjusted   nonskid shoes/slippers when out of bed   safety round/check completed   room organization consistent   Goal Outcome Evaluation:

## 2024-11-10 NOTE — PROGRESS NOTES
Dr. ROSALINDA Lee    [facility]        Patient ID:  Name:  Corinna Almonte  MRN:  [id number]  1985  39 y.o.  female            CC/Reason for visit: Seizures    Interval hx: Patient is still confused.  At times she says a simple yes or no to some questions but other times she just stares at me.  She is trying to eat breakfast but she is extremely slow and has some coordination difficulty    ROS: Difficult to obtain due to confusion    Vitals:  Vitals:    11/10/24 0300 11/10/24 0427 11/10/24 0700 11/10/24 1100   BP: 145/93 144/89 132/71 107/77   BP Location:  Right arm Right arm Right arm   Patient Position:  Lying     Pulse: 68 73 100 84   Resp:  18 20 15   Temp:  98 °F (36.7 °C) 98.1 °F (36.7 °C) 98.4 °F (36.9 °C)   TempSrc:  Oral Oral Oral   SpO2: 97% 98% (!) 83%    Weight:       Height:         FiO2 (%): 100 %     Body mass index is 21.72 kg/m².    Intake/Output Summary (Last 24 hours) at 11/10/2024 1241  Last data filed at 11/10/2024 0526  Gross per 24 hour   Intake 240 ml   Output 1800 ml   Net -1560 ml       Exam:  GEN:  No distress  Alert, oriented x 1, not very oriented, remains confused  LUNGS: Clear breath sounds bilat, no use of accessory muscles  CV:  Normal S1S2, without murmur, no edema  ABD:  Non tender, no enlarged liver or masses      Scheduled meds:  acetaminophen, 650 mg, Oral, Once  folic acid 1 mg in sodium chloride 0.9 % 50 mL IVPB, 1 mg, Intravenous, Daily  insulin lispro, 2-7 Units, Subcutaneous, 4x Daily AC & at Bedtime  lactulose, 10 g, Oral, TID  mupirocin, 1 Application, Each Nare, BID  pantoprazole, 40 mg, Intravenous, Q12H  rifAXIMin, 550 mg, Oral, Q12H  senna-docusate sodium, 2 tablet, Oral, BID  sodium chloride, 10 mL, Intravenous, Q12H  thiamine (B-1) IV, 200 mg, Intravenous, Q8H   Followed by  [START ON 11/12/2024] thiamine, 100 mg, Oral, Daily      IV meds:                      octreotide (SandoSTATIN) infusion, 25 mcg/hr, Last Rate: 25 mcg/hr (11/09/24  4421)        Data Review:   I reviewed the patient's medications and new clinical results.            Results from last 7 days   Lab Units 11/10/24  0401 11/09/24  1950 11/09/24  1616 11/09/24  1412 11/09/24  1214 11/09/24  0341 11/08/24  1309 11/08/24  0314 11/07/24  1447 11/07/24  0628 11/06/24  2301   SODIUM mmol/L 134*  --  135*  --   --  133*  --  137  --   --  136   POTASSIUM mmol/L 4.2  --  4.5 4.4  --  3.6  --  2.8*  --   --  3.8   CHLORIDE mmol/L 102  --  106  --   --  101  --  103  --   --  100   CO2 mmol/L 22.0  --  18.0*  --   --  23.0  --  22.0  --   --  23.8   BUN mg/dL 8  --  6  --   --  5*  --  5*  --   --  6   CREATININE mg/dL 0.49*  --  0.44*  --   --  0.46*  --  0.43*  --   --  0.51*   CALCIUM mg/dL 8.1*  --  7.9*  --   --  7.7*  --  7.8*  --   --  9.0   BILIRUBIN mg/dL 5.0*  --  5.0*  --   --   --   --   --   --   --  3.7*   ALK PHOS U/L 171*  --  173*  --   --   --   --   --   --   --  240*   ALT (SGPT) U/L 22  --  21  --   --   --   --   --   --   --  21   AST (SGOT) U/L 68*  --  87*  --   --   --   --   --   --   --  95*   GLUCOSE mg/dL 243*  --  112*  --   --  202*  --  116*  --   --  89   WBC 10*3/mm3 4.13 4.35  --   --  5.41 4.43   < > 3.43   < > 4.46 4.53   HEMOGLOBIN g/dL 9.4* 9.6*  --   --  9.8* 9.7*   < > 9.5*   < > 6.2* 6.8*   PLATELETS 10*3/mm3 97* 93*  --   --  84* 83*   < > 61*   < > 45* 64*   INR   --   --   --   --   --  2.41*  --  2.30*  --  2.08* 1.79*   PROBNP pg/mL  --   --   --   --   --   --   --   --   --   --  315.0   PROCALCITONIN ng/mL  --   --   --   --   --   --   --   --   --   --  0.07    < > = values in this interval not displayed.     Results from last 7 days   Lab Units 11/06/24  2350   BLOODCX  No growth at 3 days  No growth at 3 days         Results from last 7 days   Lab Units 11/07/24  0355 11/06/24  2301   CK TOTAL U/L  --  320*   HSTROP T ng/L 25* 32*     Results from last 7 days   Lab Units 11/06/24  2317   MODALITY  Room Air             ASSESSMENT:   Seizure x 2 with postictal state  Alcohol use disorder  Alcoholic liver cirrhosis  Vomiting  Epistaxis  Possible upper GI bleed  Acute blood loss anemia  Thrombocytopenia  History of gastric ulcer  History of esophageal variceal bleeding with banding  History of abdominal ascites requiring paracentesis 2023  Diabetes mellitus 2, insulin-dependent with hypoglycemia  GERD  Chronic pancreatitis  Traumatic brain injury  PTSD  Anxiety and depression  Hypoglycemia  Elevated ammonia per GI  TSH mildly elevated check free T3 and free T4      PLAN:  Patient's overall mental status is still slow, confused but slightly better.  She obviously has hepatic encephalopathy due to severe alcohol abuse and liver disease.  Her anemia and thrombocytopenia are stable.  Continue daily CBC check.  Wean oxygen as tolerated.  Needs to start ambulating, up, out of bed with physical therapy.  Stop scheduled benzos if receiving.  Only give her CIWA scoring  Transfer out of ICU today.  GI and hospitalist to follow.  We will sign off        Phu Lee MD  11/10/2024

## 2024-11-10 NOTE — PLAN OF CARE
Problem: Adult Inpatient Plan of Care  Goal: Plan of Care Review  Outcome: Progressing  Goal: Patient-Specific Goal (Individualized)  Outcome: Progressing  Goal: Absence of Hospital-Acquired Illness or Injury  Outcome: Progressing  Intervention: Identify and Manage Fall Risk  Recent Flowsheet Documentation  Taken 11/10/2024 1800 by Yessica Madison RN  Safety Promotion/Fall Prevention:   clutter free environment maintained   safety round/check completed  Taken 11/10/2024 1600 by Yessica Madison RN  Safety Promotion/Fall Prevention:   clutter free environment maintained   safety round/check completed  Taken 11/10/2024 1430 by Yessica Madison RN  Safety Promotion/Fall Prevention:   clutter free environment maintained   safety round/check completed  Taken 11/10/2024 1200 by Yessica Madison RN  Safety Promotion/Fall Prevention:   clutter free environment maintained   safety round/check completed  Taken 11/10/2024 1000 by Yessica Madison RN  Safety Promotion/Fall Prevention:   clutter free environment maintained   safety round/check completed  Taken 11/10/2024 0830 by Yessica Madison RN  Safety Promotion/Fall Prevention:   clutter free environment maintained   safety round/check completed  Intervention: Prevent Skin Injury  Recent Flowsheet Documentation  Taken 11/10/2024 1800 by Yessica Madison RN  Body Position: position changed independently  Taken 11/10/2024 1600 by Yessica Madison RN  Body Position: position changed independently  Taken 11/10/2024 1430 by Yessica Madison RN  Body Position: position changed independently  Taken 11/10/2024 1200 by Yessica Madison RN  Body Position: position changed independently  Taken 11/10/2024 1000 by Yessica Madison RN  Body Position: position changed independently  Taken 11/10/2024 0830 by Yessica Madison RN  Skin Protection: transparent dressing maintained  Intervention: Prevent and Manage VTE (Venous Thromboembolism) Risk  Recent Flowsheet Documentation  Taken  11/10/2024 0830 by Yessica Madison RN  VTE Prevention/Management:   bilateral   SCDs (sequential compression devices) on  Intervention: Prevent Infection  Recent Flowsheet Documentation  Taken 11/10/2024 1800 by Yessica Madison RN  Infection Prevention: single patient room provided  Taken 11/10/2024 1600 by Yessica Madison RN  Infection Prevention: single patient room provided  Taken 11/10/2024 1430 by Yessica Madison RN  Infection Prevention: single patient room provided  Taken 11/10/2024 1200 by Yessica Madison RN  Infection Prevention: single patient room provided  Taken 11/10/2024 1000 by Yessica Madison RN  Infection Prevention: single patient room provided  Taken 11/10/2024 0830 by Yessica Madison RN  Infection Prevention: single patient room provided  Goal: Optimal Comfort and Wellbeing  Outcome: Progressing  Intervention: Provide Person-Centered Care  Recent Flowsheet Documentation  Taken 11/10/2024 1430 by Yessica Madison RN  Trust Relationship/Rapport:   care explained   thoughts/feelings acknowledged  Taken 11/10/2024 0830 by Yessica Madison RN  Trust Relationship/Rapport:   care explained   thoughts/feelings acknowledged  Goal: Readiness for Transition of Care  Outcome: Progressing     Problem: Fall Injury Risk  Goal: Absence of Fall and Fall-Related Injury  Outcome: Progressing  Intervention: Identify and Manage Contributors  Recent Flowsheet Documentation  Taken 11/10/2024 1800 by Yessica Madison RN  Medication Review/Management: medications reviewed  Taken 11/10/2024 1600 by Yessica Madison RN  Medication Review/Management: medications reviewed  Taken 11/10/2024 1430 by Yessica Madison RN  Medication Review/Management: medications reviewed  Taken 11/10/2024 1200 by Yessica Madison RN  Medication Review/Management: medications reviewed  Taken 11/10/2024 1000 by Yessica Madison RN  Medication Review/Management: medications reviewed  Taken 11/10/2024 0830 by Yessica Madison RN  Medication  Review/Management: medications reviewed  Intervention: Promote Injury-Free Environment  Recent Flowsheet Documentation  Taken 11/10/2024 1800 by Yessica Madison RN  Safety Promotion/Fall Prevention:   clutter free environment maintained   safety round/check completed  Taken 11/10/2024 1600 by Yessica Madison RN  Safety Promotion/Fall Prevention:   clutter free environment maintained   safety round/check completed  Taken 11/10/2024 1430 by Yessica Madison RN  Safety Promotion/Fall Prevention:   clutter free environment maintained   safety round/check completed  Taken 11/10/2024 1200 by Yessica Madison RN  Safety Promotion/Fall Prevention:   clutter free environment maintained   safety round/check completed  Taken 11/10/2024 1000 by Yessica Madison RN  Safety Promotion/Fall Prevention:   clutter free environment maintained   safety round/check completed  Taken 11/10/2024 0830 by Yessica Madison RN  Safety Promotion/Fall Prevention:   clutter free environment maintained   safety round/check completed   Goal Outcome Evaluation:  Pt remain in ccu on roomair. Seems to be more alert and oriented than yesterday. No restraints needed tolerated well. On octreotide drip. Clear liquid diet. External catheter in place. Vital stable. Ongoing plan of care.

## 2024-11-10 NOTE — PROGRESS NOTES
[facility]  Inpatient Progress Note    Reason for Follow Up: Cirrhosis, GI bleed    Subjective     Interval History:   -Still very drowsy, altered, no changes overnight otherwise. Hgb stable. Labs stable otherwise, no family at bedside this morning.     Current Facility-Administered Medications:     acetaminophen (TYLENOL) tablet 650 mg, 650 mg, Oral, Once, Anabela Edmond APRN    sennosides-docusate (PERICOLACE) 8.6-50 MG per tablet 2 tablet, 2 tablet, Oral, BID, 2 tablet at 11/10/24 0919 **AND** polyethylene glycol (MIRALAX) packet 17 g, 17 g, Oral, Daily PRN **AND** bisacodyl (DULCOLAX) EC tablet 5 mg, 5 mg, Oral, Daily PRN **AND** bisacodyl (DULCOLAX) suppository 10 mg, 10 mg, Rectal, Daily PRN, Anabela Edmond APRN    Calcium Replacement - Follow Nurse / BPA Driven Protocol, , Does not apply, PRN, Jase Irvin MD    dextrose (D50W) (25 g/50 mL) IV injection 25 g, 25 g, Intravenous, PRN, Mel Gentile MD, 25 g at 11/07/24 1901    dextrose (D50W) (25 g/50 mL) IV injection 25 g, 25 g, Intravenous, Q15 Min PRN, Antoinette Yates APRN    dextrose (GLUTOSE) oral gel 15 g, 15 g, Oral, Q15 Min PRN, Antoinette Yates APRN    folic acid 1 mg in sodium chloride 0.9 % 50 mL IVPB, 1 mg, Intravenous, Daily, Luis A Melgar MD, Last Rate: 100 mL/hr at 11/10/24 0928, 1 mg at 11/10/24 0928    glucagon (GLUCAGEN) injection 1 mg, 1 mg, Intramuscular, Q15 Min PRN, Antoinette Yates APRN    insulin lispro (HUMALOG/ADMELOG) injection 2-7 Units, 2-7 Units, Subcutaneous, 4x Daily AC & at Bedtime, Antoinette Yates APRN, 3 Units at 11/10/24 0919    lactulose (CHRONULAC) 10 GM/15ML solution 10 g, 10 g, Oral, TID, Caleb Hodges MD, 10 g at 11/10/24 0919    LORazepam (ATIVAN) tablet 1 mg, 1 mg, Oral, Q1H PRN **OR** LORazepam (ATIVAN) injection 1 mg, 1 mg, Intravenous, Q1H PRN **OR** LORazepam (ATIVAN) tablet 2 mg, 2 mg, Oral, Q1H PRN **OR** LORazepam (ATIVAN) injection 2 mg, 2 mg, Intravenous, Q1H  PRN, 2 mg at 11/09/24 0338 **OR** LORazepam (ATIVAN) injection 2 mg, 2 mg, Intravenous, Q15 Min PRN, 2 mg at 11/10/24 0139 **OR** LORazepam (ATIVAN) injection 2 mg, 2 mg, Intramuscular, Q15 Min PRN, 2 mg at 11/08/24 0216 **OR** LORazepam (ATIVAN) tablet 4 mg, 4 mg, Oral, Q1H PRN **OR** LORazepam (ATIVAN) injection 4 mg, 4 mg, Intravenous, Q1H PRN, Luis A Melgar MD    mupirocin (BACTROBAN) 2 % nasal ointment 1 Application, 1 Application, Each Nare, BID, Anabela Edmond APRN, 1 Application at 11/08/24 2127    nitroglycerin (NITROSTAT) SL tablet 0.4 mg, 0.4 mg, Sublingual, Q5 Min PRN, Anabela Edmond APRN    octreotide (sandoSTATIN) 500 mcg in sodium chloride 0.9 % 100 mL (5 mcg/mL) infusion, 25 mcg/hr, Intravenous, Continuous, Mel Gentile MD, Last Rate: 5 mL/hr at 11/09/24 1558, 25 mcg/hr at 11/09/24 1558    ondansetron (ZOFRAN) injection 4 mg, 4 mg, Intravenous, Q6H PRN, Anabela Edmond APRN    pantoprazole (PROTONIX) injection 40 mg, 40 mg, Intravenous, Q12H, Yony Brooks MD, 40 mg at 11/10/24 0919    Phosphorus Replacement - Follow Nurse / BPA Driven Protocol, , Does not apply, Brandee VILLAFUERTE Scott P, MD    Potassium Replacement - Follow Nurse / BPA Driven Protocol, , Does not apply, Brandee VILLAFUERTE Scott P, MD    Potassium Replacement - Follow Nurse / BPA Driven Protocol, , Does not apply, Brandee VILLAFUERTE Scott P, MD    riFAXIMin (XIFAXAN) tablet 550 mg, 550 mg, Oral, Q12H, Caleb Hodges MD, 550 mg at 11/10/24 0919    [COMPLETED] Insert Peripheral IV, , , Once **AND** sodium chloride 0.9 % flush 10 mL, 10 mL, Intravenous, PRN, Mel Gentile MD    sodium chloride 0.9 % flush 10 mL, 10 mL, Intravenous, Q12H, Anabela Edmond, APRN, 10 mL at 11/10/24 0919    thiamine (B-1) injection 200 mg, 200 mg, Intravenous, Q8H, 200 mg at 11/10/24 0524 **FOLLOWED BY** [START ON 11/12/2024] thiamine (VITAMIN B-1) tablet 100 mg, 100 mg, Oral, Daily, Luis A Melgar MD  Review of Systems:    There is  weakness and fatigue all other systems reviewed and negative    Objective     Vital Signs  Temp:  [97 °F (36.1 °C)-98.9 °F (37.2 °C)] 98.4 °F (36.9 °C)  Heart Rate:  [] 84  Resp:  [15-20] 15  BP: (107-148)/(71-98) 107/77  Body mass index is 21.72 kg/m².    Intake/Output Summary (Last 24 hours) at 11/10/2024 1312  Last data filed at 11/10/2024 0526  Gross per 24 hour   Intake 240 ml   Output 800 ml   Net -560 ml     No intake/output data recorded.     Physical Exam:   General: patient awake, alert and cooperative   Eyes: Normal lids and lashes, no scleral icterus   Neck: supple, normal ROM   Skin: warm and dry, not jaundiced   Cardiovascular: regular rhythm and rate, no murmurs auscultated   Pulm: clear to auscultation bilaterally, regular and unlabored   Abdomen: soft, nontender, nondistended; normal bowel sounds   Extremities: no rash or edema   Psychiatric: Normal mood and behavior; memory intact     Results Review:     I reviewed the patient's new clinical results.    Results from last 7 days   Lab Units 11/10/24  0401 11/09/24  1950 11/09/24  1214   WBC 10*3/mm3 4.13 4.35 5.41   HEMOGLOBIN g/dL 9.4* 9.6* 9.8*   HEMATOCRIT % 29.1* 28.0* 30.1*   PLATELETS 10*3/mm3 97* 93* 84*     Results from last 7 days   Lab Units 11/10/24  0401 11/09/24  1616 11/09/24  1412 11/09/24  0341 11/08/24  0314 11/06/24  2301   SODIUM mmol/L 134* 135*  --  133*   < > 136   POTASSIUM mmol/L 4.2 4.5 4.4 3.6   < > 3.8   CHLORIDE mmol/L 102 106  --  101   < > 100   CO2 mmol/L 22.0 18.0*  --  23.0   < > 23.8   BUN mg/dL 8 6  --  5*   < > 6   CREATININE mg/dL 0.49* 0.44*  --  0.46*   < > 0.51*   CALCIUM mg/dL 8.1* 7.9*  --  7.7*   < > 9.0   BILIRUBIN mg/dL 5.0* 5.0*  --   --   --  3.7*   ALK PHOS U/L 171* 173*  --   --   --  240*   ALT (SGPT) U/L 22 21  --   --   --  21   AST (SGOT) U/L 68* 87*  --   --   --  95*   GLUCOSE mg/dL 243* 112*  --  202*   < > 89    < > = values in this interval not displayed.     Results from last 7 days    Lab Units 11/09/24  0341 11/08/24  0314 11/07/24  0628   INR  2.41* 2.30* 2.08*     Lab Results   Lab Value Date/Time    LIPASE 15 11/06/2024 2301    LIPASE 43 12/03/2016 0657    LIPASE 51 12/02/2016 0647    LIPASE 62 (H) 12/01/2016 0753    LIPASE 87 (H) 11/30/2016 1612       Radiology:  CT Head Without Contrast   Final Result         Electronically signed by Katie Avila MD on 11-07-24 at 0037      CT Abdomen Pelvis With Contrast   Final Result         Electronically signed by Katie Avila MD on 11-07-24 at 0039      XR Chest 1 View   Final Result         Electronically signed by Katie Avila MD on 11-06-24 at 2340          Assessment & Plan     Active Hospital Problems    Diagnosis     **Alcohol abuse with withdrawal     History of esophageal varices     Hematemesis        Assessment:    Decompensated etoh cirrhosis with bleeding varices  -Suspect acute hepatic encephalopathy  -Start Rifaximin  -Lactulose  -Small ascites    2. Esophageal varices with bleeding   -S/p Banding    -MELD 21, check daily CMP, acute on chronic liver failure    Plan: Start rifxamin, lactulose, likely decompesnation 2/2 to GI bleeding, hgb stable now. Overall poor prognosis, previous episodes and decompensation, pt continues to drink, unlikely transplant candidate. Recommend psychiatry consult when acute encephalopathy improves. Calorie count, if not getting enough nutrition recommend DHT and TF.       Caleb Hodges MD

## 2024-11-11 PROBLEM — E83.42 HYPOMAGNESEMIA: Status: ACTIVE | Noted: 2024-11-11

## 2024-11-11 LAB
ALBUMIN SERPL-MCNC: 3.3 G/DL (ref 3.5–5.2)
ALBUMIN/GLOB SERPL: 0.9 G/DL
ALP SERPL-CCNC: 163 U/L (ref 39–117)
ALT SERPL W P-5'-P-CCNC: 15 U/L (ref 1–33)
ANION GAP SERPL CALCULATED.3IONS-SCNC: 13.5 MMOL/L (ref 5–15)
AST SERPL-CCNC: 54 U/L (ref 1–32)
BASOPHILS # BLD AUTO: 0.04 10*3/MM3 (ref 0–0.2)
BASOPHILS NFR BLD AUTO: 1 % (ref 0–1.5)
BILIRUB SERPL-MCNC: 4.7 MG/DL (ref 0–1.2)
BUN SERPL-MCNC: 6 MG/DL (ref 6–20)
BUN/CREAT SERPL: 16.2 (ref 7–25)
CALCIUM SPEC-SCNC: 8.7 MG/DL (ref 8.6–10.5)
CHLORIDE SERPL-SCNC: 102 MMOL/L (ref 98–107)
CO2 SERPL-SCNC: 22.5 MMOL/L (ref 22–29)
CREAT SERPL-MCNC: 0.37 MG/DL (ref 0.57–1)
DEPRECATED RDW RBC AUTO: 43.8 FL (ref 37–54)
EGFRCR SERPLBLD CKD-EPI 2021: 130.9 ML/MIN/1.73
EOSINOPHIL # BLD AUTO: 0.03 10*3/MM3 (ref 0–0.4)
EOSINOPHIL NFR BLD AUTO: 0.7 % (ref 0.3–6.2)
ERYTHROCYTE [DISTWIDTH] IN BLOOD BY AUTOMATED COUNT: 15.4 % (ref 12.3–15.4)
GLOBULIN UR ELPH-MCNC: 3.7 GM/DL
GLUCOSE BLDC GLUCOMTR-MCNC: 110 MG/DL (ref 70–130)
GLUCOSE BLDC GLUCOMTR-MCNC: 291 MG/DL (ref 70–130)
GLUCOSE BLDC GLUCOMTR-MCNC: 301 MG/DL (ref 70–130)
GLUCOSE BLDC GLUCOMTR-MCNC: 349 MG/DL (ref 70–130)
GLUCOSE SERPL-MCNC: 133 MG/DL (ref 65–99)
HCT VFR BLD AUTO: 30.5 % (ref 34–46.6)
HGB BLD-MCNC: 9.9 G/DL (ref 12–15.9)
IMM GRANULOCYTES # BLD AUTO: 0.01 10*3/MM3 (ref 0–0.05)
IMM GRANULOCYTES NFR BLD AUTO: 0.2 % (ref 0–0.5)
LYMPHOCYTES # BLD AUTO: 1.37 10*3/MM3 (ref 0.7–3.1)
LYMPHOCYTES NFR BLD AUTO: 33.6 % (ref 19.6–45.3)
MAGNESIUM SERPL-MCNC: 1.5 MG/DL (ref 1.6–2.6)
MCH RBC QN AUTO: 26.1 PG (ref 26.6–33)
MCHC RBC AUTO-ENTMCNC: 32.5 G/DL (ref 31.5–35.7)
MCV RBC AUTO: 80.5 FL (ref 79–97)
MONOCYTES # BLD AUTO: 0.52 10*3/MM3 (ref 0.1–0.9)
MONOCYTES NFR BLD AUTO: 12.7 % (ref 5–12)
NEUTROPHILS NFR BLD AUTO: 2.11 10*3/MM3 (ref 1.7–7)
NEUTROPHILS NFR BLD AUTO: 51.8 % (ref 42.7–76)
NRBC BLD AUTO-RTO: 0 /100 WBC (ref 0–0.2)
PHOSPHATE SERPL-MCNC: 4 MG/DL (ref 2.5–4.5)
PLATELET # BLD AUTO: 105 10*3/MM3 (ref 140–450)
PMV BLD AUTO: 10 FL (ref 6–12)
POTASSIUM SERPL-SCNC: 3.7 MMOL/L (ref 3.5–5.2)
PROT SERPL-MCNC: 7 G/DL (ref 6–8.5)
RBC # BLD AUTO: 3.79 10*6/MM3 (ref 3.77–5.28)
SODIUM SERPL-SCNC: 138 MMOL/L (ref 136–145)
WBC NRBC COR # BLD AUTO: 4.08 10*3/MM3 (ref 3.4–10.8)

## 2024-11-11 PROCEDURE — 84100 ASSAY OF PHOSPHORUS: CPT | Performed by: HOSPITALIST

## 2024-11-11 PROCEDURE — 25010000002 THIAMINE HCL 200 MG/2ML SOLUTION: Performed by: HOSPITALIST

## 2024-11-11 PROCEDURE — 97530 THERAPEUTIC ACTIVITIES: CPT

## 2024-11-11 PROCEDURE — 83735 ASSAY OF MAGNESIUM: CPT | Performed by: HOSPITALIST

## 2024-11-11 PROCEDURE — 85025 COMPLETE CBC W/AUTO DIFF WBC: CPT | Performed by: HOSPITALIST

## 2024-11-11 PROCEDURE — 63710000001 INSULIN LISPRO (HUMAN) PER 5 UNITS

## 2024-11-11 PROCEDURE — 36415 COLL VENOUS BLD VENIPUNCTURE: CPT | Performed by: INTERNAL MEDICINE

## 2024-11-11 PROCEDURE — 80053 COMPREHEN METABOLIC PANEL: CPT | Performed by: INTERNAL MEDICINE

## 2024-11-11 PROCEDURE — 97162 PT EVAL MOD COMPLEX 30 MIN: CPT

## 2024-11-11 PROCEDURE — 82948 REAGENT STRIP/BLOOD GLUCOSE: CPT

## 2024-11-11 PROCEDURE — 25010000002 OCTREOTIDE PER 25 MCG: Performed by: EMERGENCY MEDICINE

## 2024-11-11 RX ADMIN — OCTREOTIDE ACETATE 25 MCG/HR: 500 INJECTION, SOLUTION INTRAVENOUS; SUBCUTANEOUS at 10:18

## 2024-11-11 RX ADMIN — SENNOSIDES AND DOCUSATE SODIUM 2 TABLET: 50; 8.6 TABLET ORAL at 20:58

## 2024-11-11 RX ADMIN — LACTULOSE 10 G: 10 SOLUTION ORAL at 08:55

## 2024-11-11 RX ADMIN — Medication 10 ML: at 08:56

## 2024-11-11 RX ADMIN — INSULIN LISPRO 5 UNITS: 100 INJECTION, SOLUTION INTRAVENOUS; SUBCUTANEOUS at 11:52

## 2024-11-11 RX ADMIN — RIFAXIMIN 550 MG: 550 TABLET ORAL at 20:57

## 2024-11-11 RX ADMIN — Medication 10 ML: at 20:58

## 2024-11-11 RX ADMIN — INSULIN LISPRO 5 UNITS: 100 INJECTION, SOLUTION INTRAVENOUS; SUBCUTANEOUS at 20:58

## 2024-11-11 RX ADMIN — LACTULOSE 10 G: 10 SOLUTION ORAL at 17:03

## 2024-11-11 RX ADMIN — PANTOPRAZOLE SODIUM 40 MG: 40 INJECTION, POWDER, FOR SOLUTION INTRAVENOUS at 20:57

## 2024-11-11 RX ADMIN — SENNOSIDES AND DOCUSATE SODIUM 2 TABLET: 50; 8.6 TABLET ORAL at 08:55

## 2024-11-11 RX ADMIN — THIAMINE HYDROCHLORIDE 200 MG: 100 INJECTION, SOLUTION INTRAMUSCULAR; INTRAVENOUS at 21:00

## 2024-11-11 RX ADMIN — FOLIC ACID 1 MG: 5 INJECTION, SOLUTION INTRAMUSCULAR; INTRAVENOUS; SUBCUTANEOUS at 08:56

## 2024-11-11 RX ADMIN — PANTOPRAZOLE SODIUM 40 MG: 40 INJECTION, POWDER, FOR SOLUTION INTRAVENOUS at 08:55

## 2024-11-11 RX ADMIN — RIFAXIMIN 550 MG: 550 TABLET ORAL at 08:55

## 2024-11-11 RX ADMIN — THIAMINE HYDROCHLORIDE 200 MG: 100 INJECTION, SOLUTION INTRAMUSCULAR; INTRAVENOUS at 08:55

## 2024-11-11 RX ADMIN — INSULIN LISPRO 4 UNITS: 100 INJECTION, SOLUTION INTRAVENOUS; SUBCUTANEOUS at 17:03

## 2024-11-11 RX ADMIN — THIAMINE HYDROCHLORIDE 200 MG: 100 INJECTION, SOLUTION INTRAMUSCULAR; INTRAVENOUS at 17:03

## 2024-11-11 NOTE — CONSULTS
Nutrition Services    Patient Name:  Corinna Almonte  YOB: 1985  MRN: [id number]  Admit Date:  2024    Assessment Date:  24    Summary:   Calorie count, malnutrition screening tool score of 2. RD visited pt and pt's  at bedside. Pt's  answered most questions. Pt is reportedly a very poor eater, consuming just one meal daily if her  prepares it for her. If her  is not around to prepare the pt a meal, she does not eat. Pt stated she drinks 1-2 Premier Protein (chocolate) shakes daily and she is agreeable to chocolate ONS when her diet is advanced. Pt's  reported significant wt loss since , from 238# to 110#, and now back up to ~130#. Pt denied food allergies. NFPE completed and not consistent with nutrition diagnosis of malnutrition at this time using AND/ASPEN criteria.    Calorie Count:  -Minimal intake in the setting of clear liquid diet. RD to monitor for diet advancement.     Plan:  -monitor for ability to advance diet past clear liquids.   -boost breeze not appropriate with current blood glucose levels.       CLINICAL NUTRITION ASSESSMENT      Reason for Assessment Calorie Count, MST score 2+     Diagnosis/Problem   Alcohol abuse with withdrawal.  Alcoholic cirrhosis with esophageal varices s/p banding, DM, GERD, chronic pancreatitis, TBI, PTSD. Pt drinks 1 gallon of vodka daily.     Medical/Surgical History Past Medical History:   Diagnosis Date    ALC (alcoholic liver cirrhosis)     Alcohol abuse with alcohol-induced anxiety disorder     Anxiety     Depression     Disease of thyroid gland     History of anemia     Mild    History of esophageal varices     History of thrombocytopenia     Status post alcohol detoxification        Past Surgical History:   Procedure Laterality Date     SECTION      x2    ENDOSCOPY N/A 2023    Procedure: ESOPHAGOGASTRODUODENOSCOPY;  Surgeon: Yony Richardson MD;  Location: [facility] ENDOSCOPY;   "Service: Gastroenterology;  Laterality: N/A;  pre: CIRRHOSIS  post: VARICES, PORTAL GASTROPATHY, GASTRITIS    ENDOSCOPY N/A 11/7/2024    Procedure: ESOPHAGOGASTRODUODENOSCOPY AT BEDSIDE with esophageal varices banding;  Surgeon: Yony Brooks MD;  Location: [facility] ENDOSCOPY;  Service: Gastroenterology;  Laterality: N/A;  esophageal varices    TONSILLECTOMY          Anthropometrics        Current Height  Current Weight  BMI kg/m2 Height: 162.6 cm (64\")  Weight: 57.4 kg (126 lb 8.7 oz) (11/08/24 0534)  Body mass index is 21.72 kg/m².   Adjusted BMI (if applicable)    BMI Category Normal/Healthy (18.4 - 24.9)   Ideal Body Weight (IBW) 120#   Usual Body Weight (UBW) 238# in 2021, 110-130# as of late per pt's     Weight Trend Loss, Other: wt loss since 2021   Weight History Wt Readings from Last 30 Encounters:   11/08/24 0534 57.4 kg (126 lb 8.7 oz)   11/06/24 2245 59 kg (130 lb)   12/07/23 2013 53.2 kg (117 lb 4.6 oz)   12/06/23 1858 53.5 kg (118 lb)   12/03/16 0538 78.3 kg (172 lb 11.2 oz)   12/02/16 0849 79.4 kg (175 lb)   12/01/16 1455 74.8 kg (164 lb 14.5 oz)   11/30/16 1554 74.8 kg (165 lb)      --  Labs       Pertinent Labs Hypomagnesemia - replacement available    Results from last 7 days   Lab Units 11/11/24  0552 11/10/24  0401 11/09/24  1616   SODIUM mmol/L 138 134* 135*   POTASSIUM mmol/L 3.7 4.2 4.5   CHLORIDE mmol/L 102 102 106   CO2 mmol/L 22.5 22.0 18.0*   BUN mg/dL 6 8 6   CREATININE mg/dL 0.37* 0.49* 0.44*   CALCIUM mg/dL 8.7 8.1* 7.9*   BILIRUBIN mg/dL 4.7* 5.0* 5.0*   ALK PHOS U/L 163* 171* 173*   ALT (SGPT) U/L 15 22 21   AST (SGOT) U/L 54* 68* 87*   GLUCOSE mg/dL 133* 243* 112*     Results from last 7 days   Lab Units 11/11/24  0552 11/10/24  0401 11/09/24  1214 11/09/24  0341   MAGNESIUM mg/dL 1.5* 1.7  --  1.9   PHOSPHORUS mg/dL 4.0 3.4   < > 2.1*   HEMOGLOBIN g/dL 9.9* 9.4*   < > 9.7*   HEMATOCRIT % 30.5* 29.1*   < > 28.0*   WBC 10*3/mm3 4.08 4.13   < > 4.43   ALBUMIN g/dL 3.3* 3.3*   < " >  --     < > = values in this interval not displayed.     Results from last 7 days   Lab Units 11/11/24  0552 11/10/24  0401 11/09/24  1950 11/09/24  1214 11/09/24  0341 11/08/24  2142 11/08/24  0314 11/07/24  2137 11/07/24  0628 11/06/24  2301   INR   --   --   --   --  2.41*  --  2.30*  --  2.08* 1.79*   APTT seconds  --   --   --   --  44.2*  --  42.1*  --  42.8* 41.5*   PLATELETS 10*3/mm3 105* 97* 93* 84* 83*   < > 61*   < > 45* 64*    < > = values in this interval not displayed.     COVID19   Date Value Ref Range Status   11/06/2024 Not Detected Not Detected - Ref. Range Final     Lab Results   Component Value Date    HGBA1C 7.90 (H) 11/07/2024          Medications           Scheduled Medications acetaminophen, 650 mg, Oral, Once  folic acid 1 mg in sodium chloride 0.9 % 50 mL IVPB, 1 mg, Intravenous, Daily  insulin lispro, 2-7 Units, Subcutaneous, 4x Daily AC & at Bedtime  lactulose, 10 g, Oral, TID  mupirocin, 1 Application, Each Nare, BID  pantoprazole, 40 mg, Intravenous, Q12H  rifAXIMin, 550 mg, Oral, Q12H  senna-docusate sodium, 2 tablet, Oral, BID  sodium chloride, 10 mL, Intravenous, Q12H  thiamine (B-1) IV, 200 mg, Intravenous, Q8H   Followed by  [START ON 11/12/2024] thiamine, 100 mg, Oral, Daily       Infusions octreotide (SandoSTATIN) infusion, 25 mcg/hr, Last Rate: 25 mcg/hr (11/11/24 1018)       PRN Medications   senna-docusate sodium **AND** polyethylene glycol **AND** bisacodyl **AND** bisacodyl    Calcium Replacement - Follow Nurse / BPA Driven Protocol    dextrose    dextrose    dextrose    glucagon (human recombinant)    LORazepam **OR** LORazepam **OR** LORazepam **OR** LORazepam **OR** LORazepam **OR** LORazepam **OR** LORazepam **OR** LORazepam    nitroglycerin    ondansetron    Phosphorus Replacement - Follow Nurse / BPA Driven Protocol    Potassium Replacement - Follow Nurse / BPA Driven Protocol    Potassium Replacement - Follow Nurse / BPA Driven Protocol    [COMPLETED] Insert  Peripheral IV **AND** sodium chloride     Physical Findings          General Findings room air   Oral/Mouth Cavity tooth or teeth missing   Edema  no edema   Gastrointestinal last bowel movement: 11/11   Skin  skin intact   Tubes/Drains/Lines none   NFPE Date Completed: 11/11  NFPE completed and not consistent with nutrition diagnosis of malnutrition at this time using AND/ASPEN criteria      --  Current Nutrition Orders & Evaluation of Intake       Oral Nutrition     Food Allergies NKFA   Current PO Diet Diet: Liquid; Clear Liquid; Fluid Consistency: Thin (IDDSI 0)   Supplement n/a   PO Evaluation     % PO Intake Minimal intake with nature of clear liquids     Factors Affecting Intake: Other: clear liquid diet   --  PES STATEMENT / NUTRITION DIAGNOSIS      Nutrition Dx Problem  Problem: Inadequate Oral Intake  Etiology: Medical Diagnosis - anxiety/depression/ETOH abuse     Signs/Symptoms: Report of Minimal PO Intake     NUTRITION INTERVENTION / PLAN OF CARE      Intervention Goal(s) Advance diet         RD Intervention/Action Encourage intake   --      Prescription/Orders:       PO Diet       Supplements       Enteral Nutrition       Parenteral Nutrition    New Prescription Ordered? No changes at this time   --      Monitor/Evaluation Per protocol, PO intake, Pertinent labs, Weight, GI status   Discharge Plan/Needs No discharge needs identified at this time   --    RD to follow per protocol.      Electronically signed by:  Steff Colvin RD  11/11/24 14:36 EST

## 2024-11-11 NOTE — PLAN OF CARE
Goal Outcome Evaluation:  Plan of Care Reviewed With: patient, spouse              Patient is a 39 y.o. female admitted to [facility] for seizure, acute upper GI bleed, hepatic encephalopathy, alcohol abuse with withdrawal on 11/6/2024. PMHx includes DM and per spouse brain injury while she was in the . Patient is ind at baseline without use of AD and lives with her spouse. Today, patient performed bed mobility with SBA, required CGA for transfers, and ambulated 100ft without AD requiring CGA. Decreased sensation in B feet, decreased strength, and overall functional decline from baseline. She reports she feels more unsteady than normal during ambulation but no overt LOB. Patient may benefit from skilled PT services to address functional deficits and improve level of independence prior to discharge. Anticipate hojme with spouse and HHPT upon DC.      Anticipated Discharge Disposition (PT): home with home health, home with assist

## 2024-11-11 NOTE — CASE MANAGEMENT/SOCIAL WORK
Continued Stay Note  [facility]     Patient Name: Corinna Almonte  MRN: [id number]  Today's Date: 11/11/2024    Admit Date: 11/6/2024    Plan: Home with spouse.  PT recommendations pending.   Discharge Plan       Row Name 11/11/24 1300       Plan    Plan Home with spouse.  PT recommendations pending.    Plan Comments Barriers to dc:Continue to trend hemoglobin, appears to be stable  Continue rifaximin and lactulose  If she continues to improve her mental status and is safe for p.o. can advance her diet  Continue IV octreotide for 24 more hours then we will discontinue                               Expected Discharge Date and Time       Expected Discharge Date Expected Discharge Time    Nov 13, 2024               Carlota Ceja RN

## 2024-11-11 NOTE — PROGRESS NOTES
Patient out of critical care unit.  Hemodynamically stable.  Being followed by GI.  Counseled to quit drinking alcohol.  No ongoing pulmonary pathology.  Request [facility] to take over as primary admitting team.  We will sign off.

## 2024-11-11 NOTE — THERAPY EVALUATION
Patient Name: Corinna Almonte  : 1985    MRN: [id number]                              Today's Date: 2024       Admit Date: 2024    Visit Dx:     ICD-10-CM ICD-9-CM   1. Acute upper GI bleeding  K92.2 578.9   2. Epistaxis  R04.0 784.7   3. Seizure  R56.9 780.39   4. Hypoglycemia  E16.2 251.2   5. Alcohol abuse with withdrawal  F10.139 291.81     305.00   6. Acute blood loss anemia  D62 285.1   7. Hepatic encephalopathy  K76.82 572.2   8. Alcoholic cirrhosis of liver without ascites  K70.30 571.2   9. History of esophageal varices  Z87.19 V12.79   10. Abrasion of tongue, initial encounter  S00.512A 910.0   11. Hematemesis, unspecified whether nausea present  K92.0 578.0     Patient Active Problem List   Diagnosis    Polysubstance overdose    Alcohol withdrawal seizure    Alcoholic hepatitis    Alcoholic cirrhosis    Esophageal varices    Type 2 diabetes mellitus with hyperglycemia    Anxiety    Depression    Severe malnutrition    Thrombocytopenia    Anemia    Coagulopathy    Alcohol abuse with withdrawal    History of esophageal varices    Hematemesis     Past Medical History:   Diagnosis Date    ALC (alcoholic liver cirrhosis)     Alcohol abuse with alcohol-induced anxiety disorder     Anxiety     Depression     Disease of thyroid gland     History of anemia     Mild    History of esophageal varices     History of thrombocytopenia     Status post alcohol detoxification      Past Surgical History:   Procedure Laterality Date     SECTION      x2    ENDOSCOPY N/A 2023    Procedure: ESOPHAGOGASTRODUODENOSCOPY;  Surgeon: Yony Richardson MD;  Location: [facility] ENDOSCOPY;  Service: Gastroenterology;  Laterality: N/A;  pre: CIRRHOSIS  post: VARICES, PORTAL GASTROPATHY, GASTRITIS    ENDOSCOPY N/A 2024    Procedure: ESOPHAGOGASTRODUODENOSCOPY AT BEDSIDE with esophageal varices banding;  Surgeon: Yony Brooks MD;  Location: [facility] ENDOSCOPY;  Service: Gastroenterology;   Laterality: N/A;  esophageal varices    TONSILLECTOMY        General Information       Row Name 11/11/24 1512          Physical Therapy Time and Intention    Document Type evaluation  -CB     Mode of Treatment individual therapy;physical therapy  -CB       Row Name 11/11/24 1512          General Information    Patient Profile Reviewed yes  -CB     Prior Level of Function independent:;gait;transfer;bed mobility  -CB     Existing Precautions/Restrictions fall  CIWA  -CB     Barriers to Rehab medically complex  -CB       Row Name 11/11/24 1512          Living Environment    People in Home spouse  -CB       Row Name 11/11/24 1512          Home Main Entrance    Number of Stairs, Main Entrance three  -CB     Stair Railings, Main Entrance railing on right side (ascending)  -CB       Row Name 11/11/24 1512          Stairs Within Home, Primary    Stairs, Within Home, Primary upstairs to shower at times but mainly stays on main level per spouse  -CB       Row Name 11/11/24 1512          Safety Issues/Impairments Affecting Functional Mobility    Impairments Affecting Function (Mobility) balance;sensation/sensory awareness;endurance/activity tolerance;strength  -CB               User Key  (r) = Recorded By, (t) = Taken By, (c) = Cosigned By      Initials Name Provider Type    CB Eli Fisher PT Physical Therapist                   Mobility       Row Name 11/11/24 1512          Bed Mobility    Bed Mobility supine-sit;sit-supine  -CB     Supine-Sit [place] (Bed Mobility) standby assist  -CB     Sit-Supine [place] (Bed Mobility) standby assist  -CB       Row Name 11/11/24 1512          Sit-Stand Transfer    Sit-Stand [place] (Transfers) contact guard;verbal cues  -CB     Comment, (Sit-Stand Transfer) x2 STS reps  -CB       Row Name 11/11/24 1512          Gait/Stairs (Locomotion)    [place] Level (Gait) contact guard;verbal cues  -CB     Assistive Device (Gait) --  no AD  -CB     Distance in Feet (Gait) 100   15,10  -CB     Deviations/Abnormal Patterns (Gait) gait speed decreased;stride length decreased  -CB     Comment, (Gait/Stairs) decreased sensation in BLE and poor proprioception while ambulating  -CB               User Key  (r) = Recorded By, (t) = Taken By, (c) = Cosigned By      Initials Name Provider Type    Eli Morel PT Physical Therapist                   Obj/Interventions       Row Name 11/11/24 1514          Range of Motion Comprehensive    General Range of Motion bilateral lower extremity ROM WNL  -CB       Row Name 11/11/24 1514          Strength Comprehensive (MMT)    General Manual Muscle Testing (MMT) Assessment lower extremity strength deficits identified  -CB       Row Name 11/11/24 1514          Balance    Balance Assessment standing static balance;standing dynamic balance  -CB     Static Standing Balance contact guard  -CB     Dynamic Standing Balance contact guard  -CB     Position/Device Used, Standing Balance unsupported  -CB     Balance Interventions standing;sit to stand;supported;static;dynamic;minimal challenge  -CB       Row Name 11/11/24 1514          Sensory Assessment (Somatosensory)    Sensory Assessment (Somatosensory) --  decreased sensation in B feet  -CB               User Key  (r) = Recorded By, (t) = Taken By, (c) = Cosigned By      Initials Name Provider Type    Eli Morel, PT Physical Therapist                   Goals/Plan       Row Name 11/11/24 1515          Bed Mobility Goal 1 (PT)    Activity/Assistive Device (Bed Mobility Goal 1, PT) bed mobility activities, all  -CB     [place] Level/Cues Needed (Bed Mobility Goal 1, PT) independent  -CB     Time Frame (Bed Mobility Goal 1, PT) long term goal (LTG);1 week  -CB       Row Name 11/11/24 1515          Transfer Goal 1 (PT)    Activity/Assistive Device (Transfer Goal 1, PT) sit-to-stand/stand-to-sit;bed-to-chair/chair-to-bed  -CB     [place] Level/Cues Needed (Transfer Goal 1, PT) independent  -CB      Time Frame (Transfer Goal 1, PT) long term goal (LTG);1 week  -CB       Row Name 11/11/24 1515          Gait Training Goal 1 (PT)    Activity/Assistive Device (Gait Training Goal 1, PT) gait (walking locomotion)  -CB     [place] Level (Gait Training Goal 1, PT) standby assist  -CB     Distance (Gait Training Goal 1, PT) 150ft  -CB     Time Frame (Gait Training Goal 1, PT) long term goal (LTG);1 week  -CB       Row Name 11/11/24 1515          Stairs Goal 1 (PT)    Activity/Assistive Device (Stairs Goal 1, PT) ascending stairs;descending stairs  -CB     [place] Level/Cues Needed (Stairs Goal 1, PT) contact guard required  -CB     Number of Stairs (Stairs Goal 1, PT) 3  -CB     Time Frame (Stairs Goal 1, PT) long term goal (LTG);1 week  -CB       Row Name 11/11/24 1513          Therapy Assessment/Plan (PT)    Planned Therapy Interventions (PT) balance training;bed mobility training;gait training;home exercise program;patient/family education;strengthening;transfer training;stair training  -CB               User Key  (r) = Recorded By, (t) = Taken By, (c) = Cosigned By      Initials Name Provider Type    Eli Morel, PT Physical Therapist                   Clinical Impression       Row Name 11/11/24 1515          Pain    Pretreatment Pain Rating 0/10 - no pain  -CB     Posttreatment Pain Rating 0/10 - no pain  -CB       Row Name 11/11/24 1517          Plan of Care Review    Plan of Care Reviewed With patient;spouse  -CB     Outcome Evaluation Patient is a 39 y.o. female admitted to [facility] for seizure, acute upper GI bleed, hepatic encephalopathy, alcohol abuse with withdrawal on 11/6/2024. PMHx includes DM and per spouse brain injury while she was in the . Patient is ind at baseline without use of AD and lives with her spouse. Today, patient performed bed mobility with SBA, required CGA for transfers, and ambulated 100ft without AD requiring CGA. Decreased sensation in B feet, decreased strength,  and overall functional decline from baseline. She reports she feels more unsteady than normal during ambulation but no overt LOB. Patient may benefit from skilled PT services to address functional deficits and improve level of independence prior to discharge. Anticipate hojme with spouse and HHPT upon DC.  -CB       Row Name 11/11/24 1514          Therapy Assessment/Plan (PT)    Rehab Potential (PT) good  -CB     Criteria for Skilled Interventions Met (PT) yes  -CB     Therapy Frequency (PT) 5 times/wk  -CB       Row Name 11/11/24 1514          Positioning and Restraints    Pre-Treatment Position in bed  -CB     Post Treatment Position bed  -CB     In Bed notified nsg;fowlers;call light within reach;encouraged to call for assist;exit alarm on;side rails up x3;with family/caregiver  -CB               User Key  (r) = Recorded By, (t) = Taken By, (c) = Cosigned By      Initials Name Provider Type    Eli Morel, PT Physical Therapist                   Outcome Measures       Row Name 11/11/24 1516 11/11/24 0908       How much help from another person do you currently need...    Turning from your back to your side while in flat bed without using bedrails? 3  -CB 3  -CC    Moving from lying on back to sitting on the side of a flat bed without bedrails? 3  -CB 3  -CC    Moving to and from a bed to a chair (including a wheelchair)? 3  -CB 2  -CC    Standing up from a chair using your arms (e.g., wheelchair, bedside chair)? 3  -CB 2  -CC    Climbing 3-5 steps with a railing? 2  -CB 2  -CC    To walk in hospital room? 3  -CB 2  -CC    AM-PAC 6 Clicks Score (PT) 17  -CB 14  -CC    Highest Level of Mobility Goal 5 --> Static standing  -CB 4 --> Transfer to chair/commode  -CC      Row Name 11/11/24 1516          Functional Assessment    Outcome Measure Options AM-PAC 6 Clicks Basic Mobility (PT)  -CB               User Key  (r) = Recorded By, (t) = Taken By, (c) = Cosigned By      Initials Name Provider Type    ALEJANDRO Fisher  Eli, PT Physical Therapist    Evelina Mcadams, RN Registered Nurse                                 Physical Therapy Education       Title: PT OT SLP Therapies (In Progress)       Topic: Physical Therapy (In Progress)       Point: Mobility training (Done)       Learning Progress Summary            Patient Acceptance, E,TB, VU,NR by  at 11/11/2024 1516                      Point: Home exercise program (Not Started)       Learner Progress:  Not documented in this visit.              Point: Body mechanics (Done)       Learning Progress Summary            Patient Acceptance, E,TB, VU,NR by  at 11/11/2024 1516                      Point: Precautions (Done)       Learning Progress Summary            Patient Acceptance, E,TB, VU,NR by  at 11/11/2024 1516                                      User Key       Initials Effective Dates Name Provider Type Discipline     10/22/21 -  Eli Fisher PT Physical Therapist PT                  PT Recommendation and Plan  Planned Therapy Interventions (PT): balance training, bed mobility training, gait training, home exercise program, patient/family education, strengthening, transfer training, stair training  Outcome Evaluation: Patient is a 39 y.o. female admitted to [facility] for seizure, acute upper GI bleed, hepatic encephalopathy, alcohol abuse with withdrawal on 11/6/2024. PMHx includes DM and per spouse brain injury while she was in the . Patient is ind at baseline without use of AD and lives with her spouse. Today, patient performed bed mobility with SBA, required CGA for transfers, and ambulated 100ft without AD requiring CGA. Decreased sensation in B feet, decreased strength, and overall functional decline from baseline. She reports she feels more unsteady than normal during ambulation but no overt LOB. Patient may benefit from skilled PT services to address functional deficits and improve level of independence prior to discharge. Anticipate hojme with  spouse and HHPT upon DC.     Time Calculation:         PT Charges       Row Name 11/11/24 1519             Time Calculation    Start Time 1430  -CB      Stop Time 1453  -CB      Time Calculation (min) 23 min  -CB      PT Received On 11/11/24  -CB      PT - Next Appointment 11/12/24  -CB      PT Goal Re-Cert Due Date 11/18/24  -CB         Time Calculation- PT    Total Timed Code Minutes- PT 12 minute(s)  -CB         Timed Charges    61143 - PT Therapeutic Activity Minutes 12  -CB         Total Minutes    Timed Charges Total Minutes 12  -CB       Total Minutes 12  -CB                User Key  (r) = Recorded By, (t) = Taken By, (c) = Cosigned By      Initials Name Provider Type    CB Eli Fisher, PT Physical Therapist                  Therapy Charges for Today       Code Description Service Date Service Provider Modifiers Qty    03975208719 HC PT THERAPEUTIC ACT EA 15 MIN 11/11/2024 Eli Fisher, PT GP 1    17612983307 HC PT EVAL MOD COMPLEXITY 3 11/11/2024 Eli Fisher, PT GP 1            PT G-Codes  Outcome Measure Options: AM-PAC 6 Clicks Basic Mobility (PT)  AM-PAC 6 Clicks Score (PT): 17  PT Discharge Summary  Anticipated Discharge Disposition (PT): home with home health, home with assist    Eli Fisher PT  11/11/2024

## 2024-11-11 NOTE — CONSULTS
Name: Corinna Almonte ADMIT: 2024   : 1985  PCP: System, Provider Not In    MRN: [id number] LOS: 4 days   AGE/SEX: 39 y.o. female  ROOM: [facility]     Subjective   Subjective   Resting in bed.  Initially no family member at bedside, but then her  entered.  Patient is reporting some upper abdominal generalized discomfort that is cramping-like.  Asking for pain medicine.  States she is having bowel movements that are nonbloody.  Denies any nausea or vomiting and has been tolerating liquid diet.  Her  just brought her in some soup from  [employer] and asking when she can advance her diet.  She states that she was able to walk in the guzman earlier.  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  He states she does not formally see anyone for her TBI, but [facility].  He states that he is planning to remove all of the alcohol from the home when they leave here.  She is denying any chest pain or trouble breathing at this time.     Objective   Objective   Vital Signs  Temp:  [96.7 °F (35.9 °C)-98.3 °F (36.8 °C)] 97.8 °F (36.6 °C)  Heart Rate:  [78-87] 80  Resp:  [10-20] 10  BP: (123-144)/(84-99) 134/89  SpO2:  [100 %] 100 %  on   ;   Device (Oxygen Therapy): room air  Body mass index is 21.72 kg/m².    Physical Exam  Vitals and nursing note reviewed.   Constitutional:       Appearance: She is ill-appearing. She is not toxic-appearing.   Eyes:      General: Scleral icterus present.   Cardiovascular:      Rate and Rhythm: Normal rate and regular rhythm.      Pulses: Normal pulses.   Pulmonary:      Effort: Pulmonary effort is normal. No respiratory distress.      Breath sounds: Normal breath sounds.   Abdominal:      General: Bowel sounds are normal. There is no distension.      Palpations: Abdomen is soft.      Tenderness: There is abdominal tenderness. There is no guarding.   Musculoskeletal:         General: No swelling. Normal  range of motion.   Skin:     General: Skin is warm and dry.   Neurological:      Mental Status: She is alert.      Motor: Weakness present.      Comments: Thinks the year is 2021 but able to tell me she is at [facility].  Does not really recall the details of coming into the hospital   Psychiatric:         Mood and Affect: Mood normal.         Behavior: Behavior normal.      Comments: A little flat       Results Review:       I reviewed the patient's new clinical results.  Results from last 7 days   Lab Units 11/11/24  0552 11/10/24  0401 11/09/24  1950 11/09/24  1214   WBC 10*3/mm3 4.08 4.13 4.35 5.41   HEMOGLOBIN g/dL 9.9* 9.4* 9.6* 9.8*   PLATELETS 10*3/mm3 105* 97* 93* 84*     Results from last 7 days   Lab Units 11/11/24  0552 11/10/24  0401 11/09/24  1616 11/09/24  1412 11/09/24  0341   SODIUM mmol/L 138 134* 135*  --  133*   POTASSIUM mmol/L 3.7 4.2 4.5 4.4 3.6   CHLORIDE mmol/L 102 102 106  --  101   CO2 mmol/L 22.5 22.0 18.0*  --  23.0   BUN mg/dL 6 8 6  --  5*   CREATININE mg/dL 0.37* 0.49* 0.44*  --  0.46*   GLUCOSE mg/dL 133* 243* 112*  --  202*   Estimated Creatinine Clearance: 183.1 mL/min (A) (by C-G formula based on SCr of 0.37 mg/dL (L)).  Results from last 7 days   Lab Units 11/11/24  0552 11/10/24  0401 11/09/24  1616 11/06/24  2301   ALBUMIN g/dL 3.3* 3.3* 3.1* 3.6   BILIRUBIN mg/dL 4.7* 5.0* 5.0* 3.7*   ALK PHOS U/L 163* 171* 173* 240*   AST (SGOT) U/L 54* 68* 87* 95*   ALT (SGPT) U/L 15 22 21 21     Results from last 7 days   Lab Units 11/11/24  0552 11/10/24  0401 11/09/24  1616 11/09/24  1412 11/09/24  0341 11/08/24  1309 11/08/24  0314 11/06/24  2301   CALCIUM mg/dL 8.7 8.1* 7.9*  --  7.7*  --    < > 9.0   ALBUMIN g/dL 3.3* 3.3* 3.1*  --   --   --   --  3.6   MAGNESIUM mg/dL 1.5* 1.7  --   --  1.9 2.4   < > 1.6   PHOSPHORUS mg/dL 4.0 3.4  --  3.1 2.1*  --    < > 4.6*    < > = values in this interval not displayed.     Results from last 7 days   Lab Units 11/07/24  1447 11/07/24  0659  11/07/24  0355 11/06/24  2301   PROCALCITONIN ng/mL  --   --   --  0.07   LACTATE mmol/L 1.7 2.2* 2.5* 2.8*     Glucose   Date/Time Value Ref Range Status   11/11/2024 1137 301 (H) 70 - 130 mg/dL Final   11/10/2024 1935 221 (H) 70 - 130 mg/dL Final   11/10/2024 1621 166 (H) 70 - 130 mg/dL Final   11/10/2024 1152 121 70 - 130 mg/dL Final   11/10/2024 0602 249 (H) 70 - 130 mg/dL Final   11/09/2024 2040 137 (H) 70 - 130 mg/dL Final   11/09/2024 1520 114 70 - 130 mg/dL Final       acetaminophen, 650 mg, Oral, Once  folic acid 1 mg in sodium chloride 0.9 % 50 mL IVPB, 1 mg, Intravenous, Daily  insulin lispro, 2-7 Units, Subcutaneous, 4x Daily AC & at Bedtime  lactulose, 10 g, Oral, TID  mupirocin, 1 Application, Each Nare, BID  pantoprazole, 40 mg, Intravenous, Q12H  rifAXIMin, 550 mg, Oral, Q12H  senna-docusate sodium, 2 tablet, Oral, BID  sodium chloride, 10 mL, Intravenous, Q12H  thiamine (B-1) IV, 200 mg, Intravenous, Q8H   Followed by  [START ON 11/12/2024] thiamine, 100 mg, Oral, Daily      octreotide (SandoSTATIN) infusion, 25 mcg/hr, Last Rate: 25 mcg/hr (11/11/24 1018)    Diet: Liquid; Clear Liquid; Fluid Consistency: Thin (IDDSI 0)       Assessment/Plan     Active Hospital Problems    Diagnosis  POA    **Alcohol abuse with withdrawal [F10.139]  Yes    Hypomagnesemia [E83.42]  Yes    History of esophageal varices [Z87.19]  Not Applicable    Hematemesis [K92.0]  Yes    Anemia [D64.9]  Yes    Thrombocytopenia [D69.6]  Yes    Alcoholic cirrhosis [K70.30]  Yes    Depression [F32.A]  Yes    Type 2 diabetes mellitus with hyperglycemia [E11.65]  Yes    Alcohol withdrawal seizure [F10.939, R56.9]  Yes      Resolved Hospital Problems   No resolved problems to display.     Ms. Almonte is a 39 y.o. female with a known history of alcohol abuse, alcoholic cirrhosis complicated by esophageal varices with prior banding, DM2 and GERD that presented to the hospital after 2 reported seizures at home by her spouse.  She  apparently was drinking up to a gallon of vodka daily, but had not had any drink in the last 24 to 48 hours.  She was found in the bathroom floor and appeared to be postictal with a history of alcohol withdrawal seizures.  She apparently had some dark appearing emesis for the past couple days as well and was noted to be hypoglycemic when EMS arrived.  CT head on admission was negative.  She was admitted to the ICU and gastroenterology as well as neurology were consulted.  She did have EGD with Dr. Brooks on 11/7 and was found to have grade 2 esophageal varices that were completely eradicated and banded.  She also had portal hypertensive gastropathy.  Neurology ordered EEG which was normal and AED therapy was deferred given it was a provoked etiology from alcohol withdrawal.  [facility] was consulted to assume care 11/11 out of the ICU.    Alcohol withdrawal seizure  -Sobriety x 2 days prior to arrival.  -Seizures felt secondary to alcohol and AED deferred.  -Neurology managed and signed off 11/8.  -Neurology recommends keeping magnesium above 2.   -Monitor neuro checks/seizure precautions.  - reports history of TBI- recommended [facility] at [facility].    Alcohol abuse with withdrawal  Alcoholic cirrhosis  Hematemesis with esophageal varices  -Gastroenterology managed.  -Continue rifaximin and lactulose.  -S/P esophageal varices banding 11/7. Needs repeat EGD 1 month.  -IV Octreotide x 24 more hours per GI.  -Access to see. Alcohol cessation.  -[facility] protocol.  -Nursing to reach out to GI about diet advancement.    Anemia   Thrombocytopenia  -Levels stable/trending up.   -Continue to monitor.    Depression   Anxiety  -Contributing to the above.    DM2 with hypoglycemia   -A1c 7.9%.  -On SSI only for now d/t issues with hypoglycemia.  -Takes Lantus 30 units HS and humalog 10 units TID per home med rec.  -Continue to monitor trends. Will need to add back modified dosing of long-acting at  some point. Await further titration of her diet.    Hypomagnesemia   -Replace per protocol.  -Goal >2.    Discussed with patient,  at bedside and nurse.    [facility] to assume care out of ICU. Hopefully home soon (next 1-2 days?)    VTE Prophylaxis - SCDs  Code Status - Full code  Disposition - Anticipate discharge once cleared by GI.    VIRGEN Hurtado  [facility]  11/11/24  15:50 EST

## 2024-11-11 NOTE — PLAN OF CARE
Goal Outcome Evaluation:  Plan of Care Reviewed With: patient        Progress: no change  Outcome Evaluation: pt alert to self only; CIWA 3 overnight; no Ativan given; Restraints remain off; Octreotide remains running per orders; no s/s of distress; ext cath removed; pt x1 assist to bathroom; will continue to monitor;       Problem: Adult Inpatient Plan of Care  Goal: Absence of Hospital-Acquired Illness or Injury  Outcome: Progressing  Intervention: Identify and Manage Fall Risk  Recent Flowsheet Documentation  Taken 11/11/2024 0400 by Cecilia Howard RN  Safety Promotion/Fall Prevention: safety round/check completed  Taken 11/11/2024 0200 by Cecilia Howard RN  Safety Promotion/Fall Prevention: safety round/check completed  Taken 11/11/2024 0000 by Cecilia Howard RN  Safety Promotion/Fall Prevention: safety round/check completed  Taken 11/10/2024 2200 by Cecilia Howard RN  Safety Promotion/Fall Prevention: safety round/check completed  Taken 11/10/2024 2000 by Cecilia Howard RN  Safety Promotion/Fall Prevention: safety round/check completed  Intervention: Prevent Skin Injury  Recent Flowsheet Documentation  Taken 11/10/2024 2000 by Cecilia Howard RN  Skin Protection: incontinence pads utilized  Intervention: Prevent and Manage VTE (Venous Thromboembolism) Risk  Recent Flowsheet Documentation  Taken 11/10/2024 2000 by Cecilia Howard RN  VTE Prevention/Management:   bilateral   SCDs (sequential compression devices) on  Intervention: Prevent Infection  Recent Flowsheet Documentation  Taken 11/11/2024 0400 by Cecilia Howard RN  Infection Prevention: single patient room provided  Taken 11/11/2024 0200 by Cecilia Howard RN  Infection Prevention: single patient room provided  Taken 11/11/2024 0000 by Cecilia Howard RN  Infection Prevention: single patient room provided  Taken 11/10/2024 2200 by Cecilia Howard RN  Infection Prevention: single patient room provided  Taken 11/10/2024 2000 by Cecilia Howard  RN  Infection Prevention: single patient room provided

## 2024-11-11 NOTE — PLAN OF CARE
Goal Outcome Evaluation:              Outcome Evaluation: vss, a/o x1, RA, cwia 4,  at bedside some of the shift, IV octreotide, assist x1, very unsteady, bed alarm, seizure precautions.

## 2024-11-11 NOTE — PROGRESS NOTES
Gastroenterology   Inpatient Progress Note    Reason for Follow Up: Cirrhosis, GI bleed    Subjective     Interval History:   Hemoglobin 9.9, stable  Patient awake and alert this morning.  She is hungry.  She knows that she is in the hospital but that she was at [facility].  She also reported the year to be 2023.    No bleeding    Upper GI Endoscopy (11/07/2024 15:50) grade 2 varices with esophageal banding, portal hypertensive gastropathy    Current Facility-Administered Medications:     acetaminophen (TYLENOL) tablet 650 mg, 650 mg, Oral, Once, Anabela Edmond APRN    sennosides-docusate (PERICOLACE) 8.6-50 MG per tablet 2 tablet, 2 tablet, Oral, BID, 2 tablet at 11/10/24 2142 **AND** polyethylene glycol (MIRALAX) packet 17 g, 17 g, Oral, Daily PRN **AND** bisacodyl (DULCOLAX) EC tablet 5 mg, 5 mg, Oral, Daily PRN **AND** bisacodyl (DULCOLAX) suppository 10 mg, 10 mg, Rectal, Daily PRN, Anabela Edmond APRN    Calcium Replacement - Follow Nurse / BPA Driven Protocol, , Does not apply, PRN, Jase Irvin MD    dextrose (D50W) (25 g/50 mL) IV injection 25 g, 25 g, Intravenous, PRN, Mel Gentile MD, 25 g at 11/07/24 1901    dextrose (D50W) (25 g/50 mL) IV injection 25 g, 25 g, Intravenous, Q15 Min PRN, Antoinette Yates APRN    dextrose (GLUTOSE) oral gel 15 g, 15 g, Oral, Q15 Min PRN, Antoinette Yates APRN    folic acid 1 mg in sodium chloride 0.9 % 50 mL IVPB, 1 mg, Intravenous, Daily, Luis A Melgar MD, Last Rate: 100 mL/hr at 11/10/24 0928, 1 mg at 11/10/24 0928    glucagon (GLUCAGEN) injection 1 mg, 1 mg, Intramuscular, Q15 Min PRN, Antoinette Yates APRN    insulin lispro (HUMALOG/ADMELOG) injection 2-7 Units, 2-7 Units, Subcutaneous, 4x Daily AC & at Bedtime, Antoinette Yates, APRN, 3 Units at 11/10/24 2143    lactulose (CHRONULAC) 10 GM/15ML solution 10 g, 10 g, Oral, TID, Caleb Hodges MD, 10 g at 11/10/24 2142    LORazepam (ATIVAN) tablet 1 mg, 1 mg, Oral, Q1H PRN **OR** LORazepam (ATIVAN)  injection 1 mg, 1 mg, Intravenous, Q1H PRN **OR** LORazepam (ATIVAN) tablet 2 mg, 2 mg, Oral, Q1H PRN **OR** LORazepam (ATIVAN) injection 2 mg, 2 mg, Intravenous, Q1H PRN, 2 mg at 11/09/24 0338 **OR** LORazepam (ATIVAN) injection 2 mg, 2 mg, Intravenous, Q15 Min PRN, 2 mg at 11/10/24 0139 **OR** LORazepam (ATIVAN) injection 2 mg, 2 mg, Intramuscular, Q15 Min PRN, 2 mg at 11/08/24 0216 **OR** LORazepam (ATIVAN) tablet 4 mg, 4 mg, Oral, Q1H PRN **OR** LORazepam (ATIVAN) injection 4 mg, 4 mg, Intravenous, Q1H PRN, Luis A Melgar MD    mupirocin (BACTROBAN) 2 % nasal ointment 1 Application, 1 Application, Each Nare, BID, Anabela Edmond APRN, 1 Application at 11/08/24 2127    nitroglycerin (NITROSTAT) SL tablet 0.4 mg, 0.4 mg, Sublingual, Q5 Min PRN, Anabela Edmond APRN    octreotide (sandoSTATIN) 500 mcg in sodium chloride 0.9 % 100 mL (5 mcg/mL) infusion, 25 mcg/hr, Intravenous, Continuous, Mel Gentile MD, Last Rate: 5 mL/hr at 11/10/24 1430, 25 mcg/hr at 11/10/24 1430    ondansetron (ZOFRAN) injection 4 mg, 4 mg, Intravenous, Q6H PRN, Anabela Edmond APRN    pantoprazole (PROTONIX) injection 40 mg, 40 mg, Intravenous, Q12H, Yony Brooks MD, 40 mg at 11/10/24 2142    Phosphorus Replacement - Follow Nurse / BPA Driven Protocol, , Does not apply, Brandee VILLAFUERTE Scott P, MD    Potassium Replacement - Follow Nurse / BPA Driven Protocol, , Does not apply, Brandee VILLAFUERTE Scott P, MD    Potassium Replacement - Follow Nurse / BPA Driven Protocol, , Does not apply, PRN, Jase Irvin MD    riFAXIMin (XIFAXAN) tablet 550 mg, 550 mg, Oral, Q12H, Caleb Hodges MD, 550 mg at 11/10/24 2142    [COMPLETED] Insert Peripheral IV, , , Once **AND** sodium chloride 0.9 % flush 10 mL, 10 mL, Intravenous, PRN, Mel Gentile MD    sodium chloride 0.9 % flush 10 mL, 10 mL, Intravenous, Q12H, Anabela Edmond APRN, 10 mL at 11/10/24 2143    thiamine (B-1) injection 200 mg, 200 mg, Intravenous, Q8H, 200 mg at  11/10/24 2142 **FOLLOWED BY** [START ON 11/12/2024] thiamine (VITAMIN B-1) tablet 100 mg, 100 mg, Oral, Daily, Luis A Melgar MD  Review of Systems:               The following systems were reviewed and negative;  gastrointestinal    Objective     Vital Signs  Temp:  [96.7 °F (35.9 °C)-98.4 °F (36.9 °C)] 98.3 °F (36.8 °C)  Heart Rate:  [72-84] 78  Resp:  [10-20] 10  BP: (107-140)/(77-89) 123/84  Body mass index is 21.72 kg/m².    Intake/Output Summary (Last 24 hours) at 11/11/2024 0707  Last data filed at 11/11/2024 0231  Gross per 24 hour   Intake --   Output 1600 ml   Net -1600 ml     No intake/output data recorded.                Physical Exam:              General: patient awake, alert and cooperative, still mild confusion this morning but improved compared to documented mental status exams              Eyes: no scleral icterus              Skin: warm and dry, not jaundiced              Abdomen: soft, mild tenderness to palpation with deep palpation, nondistended; normal bowel sounds, no masses palpated, no periumbical lymphadenopathy              Psychiatric: Appropriate affect and behavior                Results Review:                I reviewed the patient's new clinical results.    Results from last 7 days   Lab Units 11/11/24  0552 11/10/24  0401 11/09/24  1950   WBC 10*3/mm3 4.08 4.13 4.35   HEMOGLOBIN g/dL 9.9* 9.4* 9.6*   HEMATOCRIT % 30.5* 29.1* 28.0*   PLATELETS 10*3/mm3 105* 97* 93*     Results from last 7 days   Lab Units 11/11/24 0552 11/10/24  0401 11/09/24  1616   SODIUM mmol/L 138 134* 135*   POTASSIUM mmol/L 3.7 4.2 4.5   CHLORIDE mmol/L 102 102 106   CO2 mmol/L 22.5 22.0 18.0*   BUN mg/dL 6 8 6   CREATININE mg/dL 0.37* 0.49* 0.44*   CALCIUM mg/dL 8.7 8.1* 7.9*   BILIRUBIN mg/dL 4.7* 5.0* 5.0*   ALK PHOS U/L 163* 171* 173*   ALT (SGPT) U/L 15 22 21   AST (SGOT) U/L 54* 68* 87*   GLUCOSE mg/dL 133* 243* 112*     Results from last 7 days   Lab Units 11/09/24  0341 11/08/24  0314  11/07/24  0628   INR  2.41* 2.30* 2.08*     Lab Results   Lab Value Date/Time    LIPASE 15 11/06/2024 2301    LIPASE 43 12/03/2016 0657    LIPASE 51 12/02/2016 0647    LIPASE 62 (H) 12/01/2016 0753    LIPASE 87 (H) 11/30/2016 1612       Radiology:  CT Head Without Contrast   Final Result         Electronically signed by Katie Avila MD on 11-07-24 at 0037      CT Abdomen Pelvis With Contrast   Final Result         Electronically signed by Katie Avila MD on 11-07-24 at 0039      XR Chest 1 View   Final Result         Electronically signed by Katie Avila MD on 11-06-24 at 2340          Assessment & Plan     Active Hospital Problems    Diagnosis     **Alcohol abuse with withdrawal     History of esophageal varices     Hematemesis        Assessment:  Cirrhosis, decompensated  Esophageal variceal bleeding status post banding  Hepatic encephalopathy  Trace ascites  Continued alcohol consumption  Nutrition risk  Portal hypertensive gastropathy    These problems are new to me.    Plan:  Continue to trend hemoglobin, appears to be stable  Continue rifaximin and lactulose  If she continues to improve her mental status and is safe for p.o. can advance her diet  Continue IV octreotide for 24 more hours then we will discontinue  I discussed the patients findings and my recommendations with patient.             [place] M.D.  [facility] [place] [address]  Office: [phone]

## 2024-11-12 PROBLEM — D62 ACUTE POST-HEMORRHAGIC ANEMIA: Status: ACTIVE | Noted: 2023-01-01

## 2024-11-12 PROBLEM — I85.10 ESOPHAGEAL VARICES IN ALCOHOLIC CIRRHOSIS: Status: ACTIVE | Noted: 2024-11-12

## 2024-11-12 PROBLEM — K92.0 HEMATEMESIS: Status: RESOLVED | Noted: 2024-11-06 | Resolved: 2024-11-12

## 2024-11-12 PROBLEM — K70.30 ESOPHAGEAL VARICES IN ALCOHOLIC CIRRHOSIS: Status: ACTIVE | Noted: 2024-11-12

## 2024-11-12 LAB
ALBUMIN SERPL-MCNC: 3.1 G/DL (ref 3.5–5.2)
ALBUMIN/GLOB SERPL: 0.9 G/DL
ALP SERPL-CCNC: 151 U/L (ref 39–117)
ALT SERPL W P-5'-P-CCNC: 17 U/L (ref 1–33)
ANION GAP SERPL CALCULATED.3IONS-SCNC: 11.9 MMOL/L (ref 5–15)
AST SERPL-CCNC: 44 U/L (ref 1–32)
BACTERIA SPEC AEROBE CULT: NORMAL
BACTERIA SPEC AEROBE CULT: NORMAL
BASOPHILS # BLD AUTO: 0.03 10*3/MM3 (ref 0–0.2)
BASOPHILS NFR BLD AUTO: 0.7 % (ref 0–1.5)
BILIRUB SERPL-MCNC: 4 MG/DL (ref 0–1.2)
BUN SERPL-MCNC: 4 MG/DL (ref 6–20)
BUN/CREAT SERPL: 10.5 (ref 7–25)
CALCIUM SPEC-SCNC: 8.2 MG/DL (ref 8.6–10.5)
CHLORIDE SERPL-SCNC: 102 MMOL/L (ref 98–107)
CO2 SERPL-SCNC: 21.1 MMOL/L (ref 22–29)
CREAT SERPL-MCNC: 0.38 MG/DL (ref 0.57–1)
DEPRECATED RDW RBC AUTO: 43.7 FL (ref 37–54)
EGFRCR SERPLBLD CKD-EPI 2021: 130.1 ML/MIN/1.73
EOSINOPHIL # BLD AUTO: 0.05 10*3/MM3 (ref 0–0.4)
EOSINOPHIL NFR BLD AUTO: 1.2 % (ref 0.3–6.2)
ERYTHROCYTE [DISTWIDTH] IN BLOOD BY AUTOMATED COUNT: 15.7 % (ref 12.3–15.4)
GLOBULIN UR ELPH-MCNC: 3.4 GM/DL
GLUCOSE BLDC GLUCOMTR-MCNC: 193 MG/DL (ref 70–130)
GLUCOSE BLDC GLUCOMTR-MCNC: 220 MG/DL (ref 70–130)
GLUCOSE BLDC GLUCOMTR-MCNC: 307 MG/DL (ref 70–130)
GLUCOSE BLDC GLUCOMTR-MCNC: 542 MG/DL (ref 70–130)
GLUCOSE SERPL-MCNC: 205 MG/DL (ref 65–99)
HCT VFR BLD AUTO: 28.3 % (ref 34–46.6)
HGB BLD-MCNC: 9.6 G/DL (ref 12–15.9)
IMM GRANULOCYTES # BLD AUTO: 0.01 10*3/MM3 (ref 0–0.05)
IMM GRANULOCYTES NFR BLD AUTO: 0.2 % (ref 0–0.5)
LYMPHOCYTES # BLD AUTO: 1.22 10*3/MM3 (ref 0.7–3.1)
LYMPHOCYTES NFR BLD AUTO: 30.4 % (ref 19.6–45.3)
MAGNESIUM SERPL-MCNC: 1.4 MG/DL (ref 1.6–2.6)
MCH RBC QN AUTO: 27.1 PG (ref 26.6–33)
MCHC RBC AUTO-ENTMCNC: 33.9 G/DL (ref 31.5–35.7)
MCV RBC AUTO: 79.9 FL (ref 79–97)
MONOCYTES # BLD AUTO: 0.5 10*3/MM3 (ref 0.1–0.9)
MONOCYTES NFR BLD AUTO: 12.5 % (ref 5–12)
NEUTROPHILS NFR BLD AUTO: 2.2 10*3/MM3 (ref 1.7–7)
NEUTROPHILS NFR BLD AUTO: 55 % (ref 42.7–76)
NRBC BLD AUTO-RTO: 0 /100 WBC (ref 0–0.2)
PHOSPHATE SERPL-MCNC: 3.6 MG/DL (ref 2.5–4.5)
PLATELET # BLD AUTO: 91 10*3/MM3 (ref 140–450)
PMV BLD AUTO: 9.4 FL (ref 6–12)
POTASSIUM SERPL-SCNC: 3.6 MMOL/L (ref 3.5–5.2)
PROT SERPL-MCNC: 6.5 G/DL (ref 6–8.5)
RBC # BLD AUTO: 3.54 10*6/MM3 (ref 3.77–5.28)
SODIUM SERPL-SCNC: 135 MMOL/L (ref 136–145)
WBC NRBC COR # BLD AUTO: 4.01 10*3/MM3 (ref 3.4–10.8)

## 2024-11-12 PROCEDURE — 63710000001 INSULIN LISPRO (HUMAN) PER 5 UNITS

## 2024-11-12 PROCEDURE — 25010000002 MAGNESIUM SULFATE 2 GM/50ML SOLUTION: Performed by: HOSPITALIST

## 2024-11-12 PROCEDURE — 63710000001 INSULIN GLARGINE PER 5 UNITS: Performed by: HOSPITALIST

## 2024-11-12 PROCEDURE — 82948 REAGENT STRIP/BLOOD GLUCOSE: CPT

## 2024-11-12 PROCEDURE — 84100 ASSAY OF PHOSPHORUS: CPT | Performed by: HOSPITALIST

## 2024-11-12 PROCEDURE — 85025 COMPLETE CBC W/AUTO DIFF WBC: CPT | Performed by: HOSPITALIST

## 2024-11-12 PROCEDURE — 25010000002 OCTREOTIDE PER 25 MCG: Performed by: EMERGENCY MEDICINE

## 2024-11-12 PROCEDURE — 83735 ASSAY OF MAGNESIUM: CPT | Performed by: HOSPITALIST

## 2024-11-12 PROCEDURE — 80053 COMPREHEN METABOLIC PANEL: CPT | Performed by: INTERNAL MEDICINE

## 2024-11-12 PROCEDURE — 97530 THERAPEUTIC ACTIVITIES: CPT

## 2024-11-12 PROCEDURE — 63710000001 INSULIN LISPRO (HUMAN) PER 5 UNITS: Performed by: HOSPITALIST

## 2024-11-12 PROCEDURE — 90791 PSYCH DIAGNOSTIC EVALUATION: CPT | Performed by: SOCIAL WORKER

## 2024-11-12 RX ORDER — MAGNESIUM SULFATE HEPTAHYDRATE 40 MG/ML
2 INJECTION, SOLUTION INTRAVENOUS
Status: COMPLETED | OUTPATIENT
Start: 2024-11-12 | End: 2024-11-12

## 2024-11-12 RX ORDER — LEVOTHYROXINE SODIUM 100 UG/1
100 TABLET ORAL
Status: DISCONTINUED | OUTPATIENT
Start: 2024-11-13 | End: 2024-11-13 | Stop reason: HOSPADM

## 2024-11-12 RX ORDER — POTASSIUM CHLORIDE 750 MG/1
40 TABLET, FILM COATED, EXTENDED RELEASE ORAL EVERY 4 HOURS
Status: COMPLETED | OUTPATIENT
Start: 2024-11-12 | End: 2024-11-12

## 2024-11-12 RX ORDER — FOLIC ACID 1 MG/1
1 TABLET ORAL DAILY
Status: DISCONTINUED | OUTPATIENT
Start: 2024-11-12 | End: 2024-11-13 | Stop reason: HOSPADM

## 2024-11-12 RX ORDER — PANTOPRAZOLE SODIUM 40 MG/1
40 TABLET, DELAYED RELEASE ORAL
Status: DISCONTINUED | OUTPATIENT
Start: 2024-11-12 | End: 2024-11-13 | Stop reason: HOSPADM

## 2024-11-12 RX ORDER — INSULIN LISPRO 100 [IU]/ML
7 INJECTION, SOLUTION INTRAVENOUS; SUBCUTANEOUS ONCE
Status: COMPLETED | OUTPATIENT
Start: 2024-11-12 | End: 2024-11-12

## 2024-11-12 RX ADMIN — PANTOPRAZOLE SODIUM 40 MG: 40 INJECTION, POWDER, FOR SOLUTION INTRAVENOUS at 08:40

## 2024-11-12 RX ADMIN — MAGNESIUM SULFATE HEPTAHYDRATE 2 G: 40 INJECTION, SOLUTION INTRAVENOUS at 12:47

## 2024-11-12 RX ADMIN — FOLIC ACID 1 MG: 5 INJECTION, SOLUTION INTRAMUSCULAR; INTRAVENOUS; SUBCUTANEOUS at 08:41

## 2024-11-12 RX ADMIN — RIFAXIMIN 550 MG: 550 TABLET ORAL at 20:25

## 2024-11-12 RX ADMIN — POTASSIUM CHLORIDE 40 MEQ: 750 TABLET, EXTENDED RELEASE ORAL at 08:41

## 2024-11-12 RX ADMIN — FOLIC ACID 1 MG: 1 TABLET ORAL at 16:49

## 2024-11-12 RX ADMIN — PANTOPRAZOLE SODIUM 40 MG: 40 TABLET, DELAYED RELEASE ORAL at 16:49

## 2024-11-12 RX ADMIN — Medication 10 ML: at 20:28

## 2024-11-12 RX ADMIN — INSULIN LISPRO 5 UNITS: 100 INJECTION, SOLUTION INTRAVENOUS; SUBCUTANEOUS at 20:25

## 2024-11-12 RX ADMIN — INSULIN LISPRO 7 UNITS: 100 INJECTION, SOLUTION INTRAVENOUS; SUBCUTANEOUS at 17:17

## 2024-11-12 RX ADMIN — THIAMINE HCL TAB 100 MG 100 MG: 100 TAB at 08:41

## 2024-11-12 RX ADMIN — LACTULOSE 10 G: 10 SOLUTION ORAL at 16:49

## 2024-11-12 RX ADMIN — POTASSIUM CHLORIDE 40 MEQ: 750 TABLET, EXTENDED RELEASE ORAL at 12:47

## 2024-11-12 RX ADMIN — INSULIN LISPRO 3 UNITS: 100 INJECTION, SOLUTION INTRAVENOUS; SUBCUTANEOUS at 08:41

## 2024-11-12 RX ADMIN — RIFAXIMIN 550 MG: 550 TABLET ORAL at 08:41

## 2024-11-12 RX ADMIN — OCTREOTIDE ACETATE 25 MCG/HR: 500 INJECTION, SOLUTION INTRAVENOUS; SUBCUTANEOUS at 06:43

## 2024-11-12 RX ADMIN — MAGNESIUM SULFATE HEPTAHYDRATE 2 G: 40 INJECTION, SOLUTION INTRAVENOUS at 16:49

## 2024-11-12 RX ADMIN — MAGNESIUM SULFATE HEPTAHYDRATE 2 G: 40 INJECTION, SOLUTION INTRAVENOUS at 14:57

## 2024-11-12 RX ADMIN — INSULIN LISPRO 7 UNITS: 100 INJECTION, SOLUTION INTRAVENOUS; SUBCUTANEOUS at 17:44

## 2024-11-12 RX ADMIN — Medication 10 ML: at 08:42

## 2024-11-12 RX ADMIN — LACTULOSE 10 G: 10 SOLUTION ORAL at 08:41

## 2024-11-12 RX ADMIN — INSULIN GLARGINE 15 UNITS: 100 INJECTION, SOLUTION SUBCUTANEOUS at 20:26

## 2024-11-12 NOTE — PLAN OF CARE
Problem: Adult Inpatient Plan of Care  Goal: Plan of Care Review  Outcome: Progressing  Flowsheets (Taken 11/12/2024 1709)  Outcome Evaluation: multiple BM today, electrolyte replacement started, pt still confused at this time but restraint free, plan for rehab at discharge, octreotide discontinued no bloody stool  Goal: Patient-Specific Goal (Individualized)  Outcome: Progressing  Goal: Absence of Hospital-Acquired Illness or Injury  Outcome: Progressing  Intervention: Identify and Manage Fall Risk  Description: Perform standard risk assessment on admission using a validated tool or comprehensive approach appropriate to the patient; reassess fall risk frequently, with change in status or transfer to another level of care.  Communicate risk to interprofessional healthcare team; ensure fall risk visible cue.  Determine need for increased observation, equipment and environmental modification, as well as use of supportive, nonskid footwear.  Adjust safety measures to individual needs and identified risk factors.  Reinforce the importance of active participation with fall risk prevention, safety, and physical activity with the patient and family.  Perform regular intentional rounding to assess need for position change, pain assessment and personal needs, including assistance with toileting.  Recent Flowsheet Documentation  Taken 11/12/2024 1600 by Alexa Allen RN  Safety Promotion/Fall Prevention: activity supervised  Taken 11/12/2024 1432 by Alexa Allen RN  Safety Promotion/Fall Prevention: activity supervised  Taken 11/12/2024 1207 by Alexa Allen RN  Safety Promotion/Fall Prevention: activity supervised  Taken 11/12/2024 1011 by Alexa Allen RN  Safety Promotion/Fall Prevention: activity supervised  Taken 11/12/2024 0843 by Alexa Allen RN  Safety Promotion/Fall Prevention:   nonskid shoes/slippers when out of bed   safety round/check completed  Intervention: Prevent and Manage VTE (Venous Thromboembolism)  Risk  Description: Assess for VTE (venous thromboembolism) risk.  Promote early mobilization; encourage both active and passive leg exercises, if unable to ambulate.  Initiate and maintain compression or other therapy, as indicated, based on identified risk in accordance with organizational protocol and provider order.  Recognize the patient's individual risk for bleeding before initiating pharmacologic thromboprophylaxis.  Recent Flowsheet Documentation  Taken 11/12/2024 0843 by Alexa Allen RN  VTE Prevention/Management:   bilateral   SCDs (sequential compression devices) on  Intervention: Prevent Infection  Description: Maintain skin and mucous membrane integrity; promote hand, oral and pulmonary hygiene.  Optimize fluid balance, nutrition, sleep and glycemic control to maximize infection resistance.  Identify potential sources of infection early to prevent or mitigate progression of infection (e.g., wound, lines, devices).  Evaluate ongoing need for invasive devices; remove promptly when no longer indicated.  Review vaccination status.  Recent Flowsheet Documentation  Taken 11/12/2024 1600 by Alexa Allen RN  Infection Prevention:   environmental surveillance performed   cohorting utilized  Taken 11/12/2024 1432 by Alexa Allen RN  Infection Prevention:   environmental surveillance performed   cohorting utilized  Taken 11/12/2024 1207 by Alexa Allen RN  Infection Prevention:   environmental surveillance performed   cohorting utilized  Taken 11/12/2024 1011 by Alexa Allen RN  Infection Prevention:   environmental surveillance performed   cohorting utilized  Taken 11/12/2024 0843 by Alexa Allen RN  Infection Prevention:   cohorting utilized   environmental surveillance performed  Goal: Optimal Comfort and Wellbeing  Outcome: Progressing  Intervention: Monitor Pain and Promote Comfort  Description: Assess pain level, treatment efficacy and patient response at regular intervals using a consistent pain  scale.  Consider the presence and impact of preexisting chronic pain.  Encourage patient and caregiver involvement in pain assessment, interventions and safety measures.  Promote activity; balance with sleep and rest to enhance healing.  Recent Flowsheet Documentation  Taken 11/12/2024 0843 by Alexa Allen, RN  Pain Management Interventions: quiet environment facilitated  Intervention: Provide Person-Centered Care  Description: Use a family-focused approach to care; encourage support system presence and participation.  Develop trust and rapport by proactively providing information, encouraging questions, addressing concerns and offering reassurance.  Acknowledge emotional response to hospitalization.  Recognize and utilize personal coping strategies and strengths; develop goals via shared decision-making.  Honor spiritual and cultural preferences.  Recent Flowsheet Documentation  Taken 11/12/2024 0843 by Alexa Allen, RN  Trust Relationship/Rapport:   care explained   choices provided   emotional support provided  Goal: Readiness for Transition of Care  Outcome: Progressing   Goal Outcome Evaluation:              Outcome Evaluation: multiple BM today, electrolyte replacement started, pt still confused at this time but restraint free, plan for rehab at discharge, octreotide discontinued no bloody stool

## 2024-11-12 NOTE — PROGRESS NOTES
Nutrition Services    Patient Name:  Corinna Almonte  YOB: 1985  MRN: [id number]  Admit Date:  11/6/2024    Kcal count:    Soft to chew: ground meat/thins initiated 11/12. No intake recorded since diet initiated. RD visited pt at bedside this morning. Pt could not remember what was sent on her tray or how well she ate.     If 100% of breakfast tray consumed, this would include 853 kcal, 41 g protein.    RD added Boost Glucose Control TID (Provides 570 kcals, 48 g protein if consumed) this morning.     Electronically signed by:  Steff Colvin RD  11/12/24 13:45 EST

## 2024-11-12 NOTE — CONSULTS
"[facility] evaluated 39-year-old female for alcohol abuse.  Patient has been seen by [facility] a couple of times before in the past with the last 1 being in December 2023.  [facility] states she did \"5 tours in [place]\".  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  Patient states [facility] not [facility] but in December 2023 she was seeing a therapist who she did not want to keep seeing because he was an older man.  Patient is on no psych meds.  Patient states she used to take Klonopin but has not refilled them.  Patient states she does not want to take antidepressants due to weight gain.  Patient denies SI and denies any history of attempts.  Patient states she has not had any psychiatric inpatient treatment.  Patient rates her current anxiety as a 10/10 and her current depressed mood as a 7/10.  Patient states she drinks \"a gallon of vodka daily\".  Patient denies drug use and denies tobacco use.  Patient states she had 1 episode of treatment in [place] but cannot remember when that was other than to say \"a long time ago\".  Patient confirmed she has had blackouts but cannot remember the last one she had.  Patient rates her craving as a 0/10.  Patient states her appetite and her sleep have been poor.    Patient lives in a house with her .  Patient appeared to be having some confusion as she stated she had 2 children ages 6 and 4 who live with their father but then states she has been  to her current  since 2003.  Patient did state that her mother recently passed away and she is going through some grief with that.  Patient states her  is her support system.  Patient states she is interested in [facility]ing [facility]  [facility]  [facility] will follow.  "

## 2024-11-12 NOTE — CASE MANAGEMENT/SOCIAL WORK
Continued Stay Note  [facility]     Patient Name: Corinna Almonte  MRN: [id number]  Today's Date: 11/12/2024    Admit Date: 11/6/2024    Plan: home with spouse; Referral to [facility]   Discharge Plan       Row Name 11/12/24 1039       Plan    Plan home with spouse; Referral to [facility]    Patient/Family in Agreement with Plan yes    Plan Comments Spoke with pt bedside to discuss d/c plan. Pt plans home with spouse; she is agreeable to , referral to [facility]. [facility] to follow.                   Discharge Codes    No documentation.                 Expected Discharge Date and Time       Expected Discharge Date Expected Discharge Time    Nov 13, 2024               CONCEPCION Richardson

## 2024-11-12 NOTE — PLAN OF CARE
Goal Outcome Evaluation:  Plan of Care Reviewed With: patient        Progress: improving  Outcome Evaluation: Patient seen this AM for PT and improving overall mobility. She completed supine to sitting EOB requiring S and then STS with SBA. She was able to increase ambulation distance to 150ft without AD requiring CGA. No overt LOB noted. Pt with slight unsteadiness at times and reports she feels more steady this date. Will continue to progress as pt tolerates. PT rec continued ambulation with [facility] staff in hallway 3x/day.    Anticipated Discharge Disposition (PT): home with home health, home with assist

## 2024-11-12 NOTE — THERAPY DISCHARGE NOTE
[facility]  [phone]    [facility] STATEMENT OF DISPOSITION        I, Corinna Almonte, [facility] in the [facility] on 11/12/2024.  I understand the recommendations below and what follow-up action is expected of me.    Outpt counseling and EFRAÍN tx:    -[facility]-[phone]                ________________________________  Patient/Parent/Guardian/POA Signature    ________________________________  Clinician Signature    11/12/2024  18:13 EST

## 2024-11-12 NOTE — PLAN OF CARE
Goal Outcome Evaluation:  Plan of Care Reviewed With: patient        Progress: no change  Outcome Evaluation: a&o x1 overnight; VSS; no s/s of distress; x1 assist to bathroom; Octreotide remains running per orders; mentation improving throughout night; CIWA 3; will continue to monitor;      Problem: Adult Inpatient Plan of Care  Goal: Absence of Hospital-Acquired Illness or Injury  Outcome: Progressing  Intervention: Identify and Manage Fall Risk  Recent Flowsheet Documentation  Taken 11/12/2024 0400 by Cecilia Howard RN  Safety Promotion/Fall Prevention: safety round/check completed  Taken 11/12/2024 0200 by Cecilia Howard RN  Safety Promotion/Fall Prevention: safety round/check completed  Taken 11/12/2024 0000 by Cecilia Howard RN  Safety Promotion/Fall Prevention: safety round/check completed  Taken 11/11/2024 2200 by Cecilia Howard RN  Safety Promotion/Fall Prevention: safety round/check completed  Taken 11/11/2024 2001 by Cecilia Howard RN  Safety Promotion/Fall Prevention: safety round/check completed  Intervention: Prevent Skin Injury  Recent Flowsheet Documentation  Taken 11/11/2024 2001 by Cecilia Howard RN  Body Position: position changed independently  Skin Protection: incontinence pads utilized  Intervention: Prevent and Manage VTE (Venous Thromboembolism) Risk  Recent Flowsheet Documentation  Taken 11/11/2024 2001 by Cecilia Howard RN  VTE Prevention/Management:   bilateral   SCDs (sequential compression devices) off  Intervention: Prevent Infection  Recent Flowsheet Documentation  Taken 11/12/2024 0400 by Cecilia Howard RN  Infection Prevention: single patient room provided  Taken 11/12/2024 0200 by Cecilia Howard RN  Infection Prevention: single patient room provided  Taken 11/12/2024 0000 by Cecilia Howard RN  Infection Prevention: single patient room provided  Taken 11/11/2024 2200 by Cecilia Howard RN  Infection Prevention: single patient room provided  Taken 11/11/2024 2001 by Lee  ADAM Brooks  Infection Prevention: single patient room provided

## 2024-11-12 NOTE — DISCHARGE PLACEMENT REQUEST
"Corinna Almonte (39 y.o. Female)       Date of Birth   1985    Social Security Number       Address   [address] [place] [address]    Home Phone   [phone]    MRN   [id number]       Jewish   Jain    Marital Status                               Admission Date   11/6/24    Admission Type   Emergency    Admitting Provider   Luis A Melgar MD    Attending Provider   Luis A Melgar MD    Department, Room/Bed   [facility], N338/1       Discharge Date       Discharge Disposition       Discharge Destination                                 Attending Provider: Luis A Melgar MD    Allergies: No Known Allergies    Isolation: None   Infection: None   Code Status: CPR    Ht: 162.6 cm (64\")   Wt: 57.4 kg (126 lb 8.7 oz)    Admission Cmt: None   Principal Problem: Alcohol abuse with withdrawal [F10.139]                   Active Insurance as of 11/6/2024       Primary Coverage       Payor Plan Insurance Group Employer/Plan Group    [id number]        Payor Plan Address Payor Plan Phone Number Payor Plan Fax Number Effective Dates    [facility] OFFICE OF COMMUNITY CARE [phone]  12/5/2023 - None Entered    [address] [address]       [facility] [address]         Subscriber Name Subscriber Birth Date Member ID       CORINNA ALMONTE 1985 [id number]                     Emergency Contacts        (Rel.) Home Phone Work Phone Mobile Phone    JACLYN ALMONTE (Spouse) -- -- [phone]          "

## 2024-11-12 NOTE — PROGRESS NOTES
Gastroenterology   Inpatient Progress Note    Reason for Follow Up: Cirrhosis and GI bleed    Subjective   No complaints  Hungry  No pain  Eating well  Interval History:   AST 44  Bilirubin 4.0 decreasing  Hemoglobin 9.6 which is stable      Current Facility-Administered Medications:     acetaminophen (TYLENOL) tablet 650 mg, 650 mg, Oral, Once, Anabela Edmond APRN    sennosides-docusate (PERICOLACE) 8.6-50 MG per tablet 2 tablet, 2 tablet, Oral, BID, 2 tablet at 11/11/24 2058 **AND** polyethylene glycol (MIRALAX) packet 17 g, 17 g, Oral, Daily PRN **AND** bisacodyl (DULCOLAX) EC tablet 5 mg, 5 mg, Oral, Daily PRN **AND** bisacodyl (DULCOLAX) suppository 10 mg, 10 mg, Rectal, Daily PRN, Anabela Edmond APRN    Calcium Replacement - Follow Nurse / BPA Driven Protocol, , Does not apply, PRN, Jase Irvin MD    dextrose (D50W) (25 g/50 mL) IV injection 25 g, 25 g, Intravenous, PRN, Mel Gentile MD, 25 g at 11/07/24 1901    dextrose (D50W) (25 g/50 mL) IV injection 25 g, 25 g, Intravenous, Q15 Min PRN, Antoinette Yates APRN    dextrose (GLUTOSE) oral gel 15 g, 15 g, Oral, Q15 Min PRN, Antoinette Yates APRN    folic acid 1 mg in sodium chloride 0.9 % 50 mL IVPB, 1 mg, Intravenous, Daily, Luis A Melgar MD, Last Rate: 100 mL/hr at 11/11/24 0856, 1 mg at 11/11/24 0856    glucagon (GLUCAGEN) injection 1 mg, 1 mg, Intramuscular, Q15 Min PRN, Antoinette Yates APRN    insulin lispro (HUMALOG/ADMELOG) injection 2-7 Units, 2-7 Units, Subcutaneous, 4x Daily AC & at Bedtime, Antoinette Yates APRN, 5 Units at 11/11/24 2058    lactulose (CHRONULAC) 10 GM/15ML solution 10 g, 10 g, Oral, TID, Caleb Hodges MD, 10 g at 11/11/24 1703    nitroglycerin (NITROSTAT) SL tablet 0.4 mg, 0.4 mg, Sublingual, Q5 Min PRN, Anabela Edmond APRN    octreotide (sandoSTATIN) 500 mcg in sodium chloride 0.9 % 100 mL (5 mcg/mL) infusion, 25 mcg/hr, Intravenous, Continuous, Mel Gentile MD, Last Rate: 5 mL/hr at 11/12/24  0643, 25 mcg/hr at 11/12/24 0643    ondansetron (ZOFRAN) injection 4 mg, 4 mg, Intravenous, Q6H PRN, Anabela Edmond APRN    pantoprazole (PROTONIX) injection 40 mg, 40 mg, Intravenous, Q12H, Yony Brooks MD, 40 mg at 11/11/24 2057    Phosphorus Replacement - Follow Nurse / BPA Driven Protocol, , Does not apply, Brandee VILLAFUERTE Scott P, MD    Potassium Replacement - Follow Nurse / BPA Driven Protocol, , Does not apply, Brandee VILLAFUERTE Scott P, MD    Potassium Replacement - Follow Nurse / BPA Driven Protocol, , Does not apply, Brandee VILLAFUERTE Scott P, MD    riFAXIMin (XIFAXAN) tablet 550 mg, 550 mg, Oral, Q12H, Caleb Hodges MD, 550 mg at 11/11/24 2057    [COMPLETED] Insert Peripheral IV, , , Once **AND** sodium chloride 0.9 % flush 10 mL, 10 mL, Intravenous, PRN, Mel Gentile MD    sodium chloride 0.9 % flush 10 mL, 10 mL, Intravenous, Q12H, Anabela Edmond APRN, 10 mL at 11/11/24 2058    [COMPLETED] thiamine (B-1) injection 200 mg, 200 mg, Intravenous, Q8H, 200 mg at 11/11/24 2100 **FOLLOWED BY** thiamine (VITAMIN B-1) tablet 100 mg, 100 mg, Oral, Daily, Luis A Melgar MD  Review of Systems:               The following systems were reviewed and negative;  gastrointestinal    Objective     Vital Signs  Temp:  [97.7 °F (36.5 °C)-98.4 °F (36.9 °C)] 97.8 °F (36.6 °C)  Heart Rate:  [77-87] 77  Resp:  [10-20] 16  BP: (113-134)/(71-89) 117/81  Body mass index is 21.72 kg/m².  No intake or output data in the 24 hours ending 11/12/24 0744  No intake/output data recorded.                Physical Exam:              General: patient awake, alert and cooperative              Eyes: no scleral icterus              Skin: warm and dry, not jaundiced              Abdomen: soft, nontender, nondistended; normal bowel sounds, no masses palpated, no periumbical lymphadenopathy              Psychiatric: Appropriate affect and behavior                Results Review:                I reviewed the patient's new clinical  results.    Results from last 7 days   Lab Units 11/12/24  0535 11/11/24  0552 11/10/24  0401   WBC 10*3/mm3 4.01 4.08 4.13   HEMOGLOBIN g/dL 9.6* 9.9* 9.4*   HEMATOCRIT % 28.3* 30.5* 29.1*   PLATELETS 10*3/mm3 91* 105* 97*     Results from last 7 days   Lab Units 11/12/24  0535 11/11/24  0552 11/10/24  0401   SODIUM mmol/L 135* 138 134*   POTASSIUM mmol/L 3.6 3.7 4.2   CHLORIDE mmol/L 102 102 102   CO2 mmol/L 21.1* 22.5 22.0   BUN mg/dL 4* 6 8   CREATININE mg/dL 0.38* 0.37* 0.49*   CALCIUM mg/dL 8.2* 8.7 8.1*   BILIRUBIN mg/dL 4.0* 4.7* 5.0*   ALK PHOS U/L 151* 163* 171*   ALT (SGPT) U/L 17 15 22   AST (SGOT) U/L 44* 54* 68*   GLUCOSE mg/dL 205* 133* 243*     Results from last 7 days   Lab Units 11/09/24  0341 11/08/24  0314 11/07/24  0628   INR  2.41* 2.30* 2.08*     Lab Results   Lab Value Date/Time    LIPASE 15 11/06/2024 2301    LIPASE 43 12/03/2016 0657    LIPASE 51 12/02/2016 0647    LIPASE 62 (H) 12/01/2016 0753    LIPASE 87 (H) 11/30/2016 1612       Radiology:  CT Head Without Contrast   Final Result         Electronically signed by Katie Avila MD on 11-07-24 at 0037      CT Abdomen Pelvis With Contrast   Final Result         Electronically signed by Katie Avila MD on 11-07-24 at 0039      XR Chest 1 View   Final Result         Electronically signed by Katie Avila MD on 11-06-24 at 2340          Assessment & Plan     Active Hospital Problems    Diagnosis     **Alcohol abuse with withdrawal     Hypomagnesemia     History of esophageal varices     Hematemesis     Anemia     Thrombocytopenia     Alcoholic cirrhosis     Depression     Type 2 diabetes mellitus with hyperglycemia     Alcohol withdrawal seizure        Assessment:  Decompensated cirrhosis  Esophageal varices status post banding  Hepatic encephalopathy  Trace ascites  Stable hemoglobin  Nutrition  Portal hypertensive gastropathy      Plan:  Continue rifaximin and lactulose  Can discontinue octreotide  Continue PPI  Follow-up with Dr. Yony Brooks after  discharge for repeat EGD and banding as needed and follow-up in his office for the cirrhosis  Alcohol counseling  Continue p.o. nutrition  GI will sign off, please call us back if needed.    I discussed the patients findings and my recommendations with patient.             [place] M.D.  [facility] [place] [id number]  Office: [phone]

## 2024-11-12 NOTE — PROGRESS NOTES
Name: Corinna Almonte ADMIT: 2024   : 1985  PCP: System, Provider Not In    MRN: [id number] LOS: 5 days   AGE/SEX: 39 y.o. female  ROOM: [facility]     Subjective   Subjective   Feels ok. Reports that she ate 2/3 of tray.        Objective   Objective   Vital Signs  Temp:  [97.8 °F (36.6 °C)-98.4 °F (36.9 °C)] 98.1 °F (36.7 °C)  Heart Rate:  [77-87] 77  Resp:  [12-20] 12  BP: (113-125)/(71-86) 125/80  SpO2:  [98 %-99 %] 98 %  on   ;   Device (Oxygen Therapy): room air  Body mass index is 21.72 kg/m².  Physical Exam  Vitals reviewed.   Eyes:      General: Scleral icterus present.   Cardiovascular:      Rate and Rhythm: Normal rate.      Heart sounds: Murmur heard.   Pulmonary:      Effort: No respiratory distress.      Breath sounds: Normal breath sounds.   Abdominal:      General: There is no distension.      Palpations: Abdomen is soft.      Tenderness: There is no abdominal tenderness.   Musculoskeletal:      Right lower leg: No edema.      Left lower leg: No edema.   Skin:     Coloration: Skin is jaundiced.   Neurological:      General: No focal deficit present.      Mental Status: She is alert.   Psychiatric:      Comments: Flat affect       Results Review     I reviewed the patient's new clinical results.  Results from last 7 days   Lab Units 24  0535 24  0552 11/10/24  0401 11/09/24  1950   WBC 10*3/mm3 4.01 4.08 4.13 4.35   HEMOGLOBIN g/dL 9.6* 9.9* 9.4* 9.6*   PLATELETS 10*3/mm3 91* 105* 97* 93*     Results from last 7 days   Lab Units 24  0535 24  0552 11/10/24  04024  1616   SODIUM mmol/L 135* 138 134* 135*   POTASSIUM mmol/L 3.6 3.7 4.2 4.5   CHLORIDE mmol/L 102 102 102 106   CO2 mmol/L 21.1* 22.5 22.0 18.0*   BUN mg/dL 4* 6 8 6   CREATININE mg/dL 0.38* 0.37* 0.49* 0.44*   GLUCOSE mg/dL 205* 133* 243* 112*   EGFR mL/min/1.73 130.1 130.9 122.4 125.6     Results from last 7 days   Lab Units 24  0535 24  0552 11/10/24  0401 24  1616   ALBUMIN g/dL  3.1* 3.3* 3.3* 3.1*   BILIRUBIN mg/dL 4.0* 4.7* 5.0* 5.0*   ALK PHOS U/L 151* 163* 171* 173*   AST (SGOT) U/L 44* 54* 68* 87*   ALT (SGPT) U/L 17 15 22 21     Results from last 7 days   Lab Units 11/12/24  0535 11/11/24  0552 11/10/24  0401 11/09/24  1616 11/09/24  1412 11/09/24  0341   CALCIUM mg/dL 8.2* 8.7 8.1* 7.9*  --  7.7*   ALBUMIN g/dL 3.1* 3.3* 3.3* 3.1*  --   --    MAGNESIUM mg/dL 1.4* 1.5* 1.7  --   --  1.9   PHOSPHORUS mg/dL 3.6 4.0 3.4  --  3.1 2.1*     Results from last 7 days   Lab Units 11/07/24  1447 11/07/24  0659 11/07/24  0355 11/06/24  2301   PROCALCITONIN ng/mL  --   --   --  0.07   LACTATE mmol/L 1.7 2.2* 2.5* 2.8*     Glucose   Date/Time Value Ref Range Status   11/12/2024 0749 220 (H) 70 - 130 mg/dL Final   11/12/2024 0604 193 (H) 70 - 130 mg/dL Final   11/11/2024 2309 110 70 - 130 mg/dL Final   11/11/2024 2001 349 (H) 70 - 130 mg/dL Final   11/11/2024 1557 291 (H) 70 - 130 mg/dL Final   11/11/2024 1137 301 (H) 70 - 130 mg/dL Final   11/10/2024 1935 221 (H) 70 - 130 mg/dL Final       No radiology results for the last day    I have personally reviewed all medications:  Scheduled Medications  acetaminophen, 650 mg, Oral, Once  folic acid 1 mg in sodium chloride 0.9 % 50 mL IVPB, 1 mg, Intravenous, Daily  insulin lispro, 2-7 Units, Subcutaneous, 4x Daily AC & at Bedtime  lactulose, 10 g, Oral, TID  [START ON 11/13/2024] levothyroxine, 100 mcg, Oral, Q AM  magnesium sulfate, 2 g, Intravenous, Q2H  pantoprazole, 40 mg, Intravenous, Q12H  rifAXIMin, 550 mg, Oral, Q12H  senna-docusate sodium, 2 tablet, Oral, BID  sodium chloride, 10 mL, Intravenous, Q12H  thiamine, 100 mg, Oral, Daily    Infusions   Diet  Diet: Regular/House, Diabetic; Consistent Carbohydrate; Texture: Soft to Chew (NDD 3); Soft to Chew: Ground Meat; Fluid Consistency: Thin (IDDSI 0)    I have personally reviewed:  [x]  Laboratory   [x]  Microbiology   [x]  Radiology   [x]  EKG/Telemetry  [x]  Cardiology/Vascular   []  Pathology     []  Records       Assessment/Plan     Active Hospital Problems    Diagnosis  POA    **Alcohol abuse with withdrawal [F10.139]  Yes    Esophageal varices in alcoholic cirrhosis [K70.30, I85.10]  Yes    Hypomagnesemia [E83.42]  Yes    Acute post-hemorrhagic anemia [D62]  Yes    Thrombocytopenia [D69.6]  Yes    Alcoholic cirrhosis [K70.30]  Yes    Depression [F32.A]  Yes    Type 2 diabetes mellitus with hyperglycemia [E11.65]  Yes    Alcohol withdrawal seizure [F10.939, R56.9]  Yes      Resolved Hospital Problems    Diagnosis Date Resolved POA    Hematemesis [K92.0] 2024 Yes       39 y.o. female with history of TBI and alcohol abuse admitted with GI bleed due to esophageal varices and subsequent alcohol withdrawal.    GI bleed seems to have resolved.  Hemoglobin remains relatively stable.  Reviewed GI note.  Octreotide drip being stopped today.  Continue Protonix though would likely can switch to oral.  Continue rifaximin and will add lactulose and follow-up with GI in the outpatient setting    From withdrawal standpoint she is doing well.  Lorazepam has .  Will continue thiamine.    DM2 is uncontrolled though somewhat improved.  Will restart some nightly Lantus given oral intake now    Continue to replace electrolytes per protocol.  Magnesium remains quite low    Discussed with pharmacy.  Restarting home levothyroxine    Cardiac murmur on exam.  Patient reports she has not known about this before.  This is not urgent but could be followed up with echocardiogram outpatient.    Discussed with  at bedside.  Patient likely to be discharged home soon.  Would like to replace her electrolytes today and continue to optimize meds with potential plan for discharge tomorrow.    DVT prophy: SCDs    Rocael Jason MD  [facility]  24  15:34 EST

## 2024-11-12 NOTE — THERAPY TREATMENT NOTE
Patient Name: Corinna Almonte  : 1985    MRN: [id number]                              Today's Date: 2024       Admit Date: 2024    Visit Dx:     ICD-10-CM ICD-9-CM   1. Acute upper GI bleeding  K92.2 578.9   2. Epistaxis  R04.0 784.7   3. Seizure  R56.9 780.39   4. Hypoglycemia  E16.2 251.2   5. Alcohol abuse with withdrawal  F10.139 291.81     305.00   6. Acute blood loss anemia  D62 285.1   7. Hepatic encephalopathy  K76.82 572.2   8. Alcoholic cirrhosis of liver without ascites  K70.30 571.2   9. History of esophageal varices  Z87.19 V12.79   10. Abrasion of tongue, initial encounter  S00.512A 910.0   11. Hematemesis, unspecified whether nausea present  K92.0 578.0     Patient Active Problem List   Diagnosis    Polysubstance overdose    Alcohol withdrawal seizure    Alcoholic hepatitis    Alcoholic cirrhosis    Esophageal varices    Type 2 diabetes mellitus with hyperglycemia    Anxiety    Depression    Severe malnutrition    Thrombocytopenia    Anemia    Coagulopathy    Alcohol abuse with withdrawal    History of esophageal varices    Hematemesis    Hypomagnesemia     Past Medical History:   Diagnosis Date    ALC (alcoholic liver cirrhosis)     Alcohol abuse with alcohol-induced anxiety disorder     Anxiety     Depression     Disease of thyroid gland     History of anemia     Mild    History of esophageal varices     History of thrombocytopenia     Status post alcohol detoxification      Past Surgical History:   Procedure Laterality Date     SECTION      x2    ENDOSCOPY N/A 2023    Procedure: ESOPHAGOGASTRODUODENOSCOPY;  Surgeon: Yony Richardson MD;  Location: [facility] ENDOSCOPY;  Service: Gastroenterology;  Laterality: N/A;  pre: CIRRHOSIS  post: VARICES, PORTAL GASTROPATHY, GASTRITIS    ENDOSCOPY N/A 2024    Procedure: ESOPHAGOGASTRODUODENOSCOPY AT BEDSIDE with esophageal varices banding;  Surgeon: Yony Brooks MD;  Location: [facility] ENDOSCOPY;  Service:  Gastroenterology;  Laterality: N/A;  esophageal varices    TONSILLECTOMY        General Information       Row Name 11/12/24 1113          Physical Therapy Time and Intention    Document Type therapy note (daily note)  -CB     Mode of Treatment individual therapy;physical therapy  -CB       Row Name 11/12/24 1113          General Information    Existing Precautions/Restrictions fall  -CB       Row Name 11/12/24 1113          Cognition    Orientation Status (Cognition) oriented x 3  -CB       Row Name 11/12/24 1113          Safety Issues/Impairments Affecting Functional Mobility    Impairments Affecting Function (Mobility) balance;sensation/sensory awareness;endurance/activity tolerance;strength  -CB               User Key  (r) = Recorded By, (t) = Taken By, (c) = Cosigned By      Initials Name Provider Type    CB Eli Fisher PT Physical Therapist                   Mobility       Row Name 11/12/24 1113          Bed Mobility    Bed Mobility supine-sit  -CB     Supine-Sit [place] (Bed Mobility) supervision  -CB       Row Name 11/12/24 1113          Sit-Stand Transfer    Sit-Stand [place] (Transfers) standby assist;verbal cues  -CB     Assistive Device (Sit-Stand Transfers) --  no AD  -CB     Comment, (Sit-Stand Transfer) x2 STS  -CB       Row Name 11/12/24 1113          Gait/Stairs (Locomotion)    [place] Level (Gait) contact guard;verbal cues  -CB     Assistive Device (Gait) --  no AD  -CB     Distance in Feet (Gait) 150  -CB     Deviations/Abnormal Patterns (Gait) gait speed decreased;stride length decreased  -CB     Bilateral Gait Deviations heel strike decreased  -CB     Comment, (Gait/Stairs) decreased sensation in BLE and poor proprioception while ambulating. no overt LOB  -CB               User Key  (r) = Recorded By, (t) = Taken By, (c) = Cosigned By      Initials Name Provider Type    CB Eli Fisher PT Physical Therapist                   Obj/Interventions       Row Name 11/12/24 1114           Balance    Balance Assessment standing static balance;standing dynamic balance;sitting static balance  -CB     Static Sitting Balance supervision  -CB     Position, Sitting Balance sitting edge of bed  -CB     Static Standing Balance standby assist  -CB     Dynamic Standing Balance contact guard;verbal cues  -CB     Position/Device Used, Standing Balance unsupported  -CB     Balance Interventions standing;sit to stand;supported;static;dynamic;minimal challenge;sitting  -CB               User Key  (r) = Recorded By, (t) = Taken By, (c) = Cosigned By      Initials Name Provider Type    CB Eli Fisher, PT Physical Therapist                   Goals/Plan    No documentation.                  Clinical Impression       Row Name 11/12/24 1115          Pain    Pretreatment Pain Rating 0/10 - no pain  -CB     Posttreatment Pain Rating 0/10 - no pain  -CB       Row Name 11/12/24 1116          Plan of Care Review    Plan of Care Reviewed With patient  -CB     Progress improving  -CB     Outcome Evaluation Patient seen this AM for PT and improving overall mobility. She completed supine to sitting EOB requiring S and then STS with SBA. She was able to increase ambulation distance to 150ft without AD requiring CGA. No overt LOB noted. Pt with slight unsteadiness at times and reports she feels more steady this date. Will continue to progress as pt tolerates. PT rec continued ambulation with nsg staff in [facility] 3x/day.  -CB       Row Name 11/12/24 1115          Positioning and Restraints    Pre-Treatment Position in bed  -CB     Post Treatment Position chair  sitting in chair while nsg assistant is changing linens then will assist pt back to bed.  -CB               User Key  (r) = Recorded By, (t) = Taken By, (c) = Cosigned By      Initials Name Provider Type    Eli Morel, PT Physical Therapist                   Outcome Measures       Row Name 11/12/24 1117          How much help from another person do you  85 currently need...    Turning from your back to your side while in flat bed without using bedrails? 4  -CB     Moving from lying on back to sitting on the side of a flat bed without bedrails? 3  -CB     Moving to and from a bed to a chair (including a wheelchair)? 3  -CB     Standing up from a chair using your arms (e.g., wheelchair, bedside chair)? 3  -CB     Climbing 3-5 steps with a railing? 3  -CB     To walk in hospital room? 3  -CB     AM-PAC 6 Clicks Score (PT) 19  -CB     Highest Level of Mobility Goal 6 --> Walk 10 steps or more  -CB       Row Name 11/12/24 1117          Functional Assessment    Outcome Measure Options AM-PAC 6 Clicks Basic Mobility (PT)  -CB               User Key  (r) = Recorded By, (t) = Taken By, (c) = Cosigned By      Initials Name Provider Type    CB Eli Fisher, PT Physical Therapist                                 Physical Therapy Education       Title: PT OT SLP Therapies (In Progress)       Topic: Physical Therapy (In Progress)       Point: Mobility training (Done)       Learning Progress Summary            Patient Acceptance, E,TB, VU,NR by CB at 11/12/2024 1117    Acceptance, E,TB, VU,NR by CB at 11/11/2024 1516                      Point: Home exercise program (Not Started)       Learner Progress:  Not documented in this visit.              Point: Body mechanics (Done)       Learning Progress Summary            Patient Acceptance, E,TB, VU,NR by CB at 11/12/2024 1117    Acceptance, E,TB, VU,NR by CB at 11/11/2024 1516                      Point: Precautions (Done)       Learning Progress Summary            Patient Acceptance, E,TB, VU,NR by CB at 11/12/2024 1117    Acceptance, E,TB, VU,NR by  at 11/11/2024 1516                                      User Key       Initials Effective Dates Name Provider Type Discipline    CB 10/22/21 -  Eli Fisher, MALORIE Physical Therapist PT                  PT Recommendation and Plan  Planned Therapy Interventions (PT): balance training,  bed mobility training, gait training, home exercise program, patient/family education, strengthening, transfer training, stair training  Progress: improving  Outcome Evaluation: Patient seen this AM for PT and improving overall mobility. She completed supine to sitting EOB requiring S and then STS with SBA. She was able to increase ambulation distance to 150ft without AD requiring CGA. No overt LOB noted. Pt with slight unsteadiness at times and reports she feels more steady this date. Will continue to progress as pt tolerates. PT rec continued ambulation with [facility] staff in [facility] 3x/day.     Time Calculation:         PT Charges       Row Name 11/12/24 1117             Time Calculation    Start Time 0908  -CB      Stop Time 0920  -CB      Time Calculation (min) 12 min  -CB      PT Received On 11/12/24  -CB      PT - Next Appointment 11/13/24  -CB         Time Calculation- PT    Total Timed Code Minutes- PT 12 minute(s)  -CB         Timed Charges    25286 - PT Therapeutic Activity Minutes 12  -CB         Total Minutes    Timed Charges Total Minutes 12  -CB       Total Minutes 12  -CB                User Key  (r) = Recorded By, (t) = Taken By, (c) = Cosigned By      Initials Name Provider Type    CB Eli Fisher, PT Physical Therapist                  Therapy Charges for Today       Code Description Service Date Service Provider Modifiers Qty    78223886819 HC PT THERAPEUTIC ACT EA 15 MIN 11/11/2024 Eli Fisher, PT GP 1    64570644415 HC PT EVAL MOD COMPLEXITY 3 11/11/2024 Eli Fisher, PT GP 1    48798932734 HC PT THERAPEUTIC ACT EA 15 MIN 11/12/2024 Eli Fisher, PT GP 1            PT G-Codes  Outcome Measure Options: AM-PAC 6 Clicks Basic Mobility (PT)  AM-PAC 6 Clicks Score (PT): 19  PT Discharge Summary  Anticipated Discharge Disposition (PT): home with home health, home with assist    Eli Fisher PT  11/12/2024

## 2024-11-12 NOTE — PROGRESS NOTES
Access gave pt contact info for [facility] so she can pursue outpt counseling and EFRAÍN tx.

## 2024-11-13 ENCOUNTER — READMISSION MANAGEMENT (OUTPATIENT)
Dept: CALL CENTER | Facility: HOSPITAL | Age: 39
End: 2024-11-13
Payer: OTHER GOVERNMENT

## 2024-11-13 VITALS
OXYGEN SATURATION: 98 % | DIASTOLIC BLOOD PRESSURE: 80 MMHG | HEART RATE: 90 BPM | WEIGHT: 126.54 LBS | SYSTOLIC BLOOD PRESSURE: 116 MMHG | TEMPERATURE: 98.8 F | RESPIRATION RATE: 14 BRPM | HEIGHT: 64 IN | BODY MASS INDEX: 21.6 KG/M2

## 2024-11-13 LAB
ALBUMIN SERPL-MCNC: 3.4 G/DL (ref 3.5–5.2)
ALBUMIN/GLOB SERPL: 1 G/DL
ALP SERPL-CCNC: 174 U/L (ref 39–117)
ALT SERPL W P-5'-P-CCNC: 16 U/L (ref 1–33)
ANION GAP SERPL CALCULATED.3IONS-SCNC: 9 MMOL/L (ref 5–15)
AST SERPL-CCNC: 38 U/L (ref 1–32)
BASOPHILS # BLD AUTO: 0.02 10*3/MM3 (ref 0–0.2)
BASOPHILS NFR BLD AUTO: 0.4 % (ref 0–1.5)
BILIRUB SERPL-MCNC: 4 MG/DL (ref 0–1.2)
BUN SERPL-MCNC: 7 MG/DL (ref 6–20)
BUN/CREAT SERPL: 13 (ref 7–25)
CALCIUM SPEC-SCNC: 8.6 MG/DL (ref 8.6–10.5)
CHLORIDE SERPL-SCNC: 100 MMOL/L (ref 98–107)
CO2 SERPL-SCNC: 22 MMOL/L (ref 22–29)
CREAT SERPL-MCNC: 0.54 MG/DL (ref 0.57–1)
DEPRECATED RDW RBC AUTO: 44.4 FL (ref 37–54)
EGFRCR SERPLBLD CKD-EPI 2021: 119.5 ML/MIN/1.73
EOSINOPHIL # BLD AUTO: 0.03 10*3/MM3 (ref 0–0.4)
EOSINOPHIL NFR BLD AUTO: 0.6 % (ref 0.3–6.2)
ERYTHROCYTE [DISTWIDTH] IN BLOOD BY AUTOMATED COUNT: 15.6 % (ref 12.3–15.4)
GLOBULIN UR ELPH-MCNC: 3.4 GM/DL
GLUCOSE BLDC GLUCOMTR-MCNC: 398 MG/DL (ref 70–130)
GLUCOSE SERPL-MCNC: 388 MG/DL (ref 65–99)
HCT VFR BLD AUTO: 27.9 % (ref 34–46.6)
HGB BLD-MCNC: 9.3 G/DL (ref 12–15.9)
IMM GRANULOCYTES # BLD AUTO: 0.01 10*3/MM3 (ref 0–0.05)
IMM GRANULOCYTES NFR BLD AUTO: 0.2 % (ref 0–0.5)
LYMPHOCYTES # BLD AUTO: 1.29 10*3/MM3 (ref 0.7–3.1)
LYMPHOCYTES NFR BLD AUTO: 27.4 % (ref 19.6–45.3)
MAGNESIUM SERPL-MCNC: 2 MG/DL (ref 1.6–2.6)
MCH RBC QN AUTO: 26.8 PG (ref 26.6–33)
MCHC RBC AUTO-ENTMCNC: 33.3 G/DL (ref 31.5–35.7)
MCV RBC AUTO: 80.4 FL (ref 79–97)
MONOCYTES # BLD AUTO: 0.62 10*3/MM3 (ref 0.1–0.9)
MONOCYTES NFR BLD AUTO: 13.2 % (ref 5–12)
NEUTROPHILS NFR BLD AUTO: 2.73 10*3/MM3 (ref 1.7–7)
NEUTROPHILS NFR BLD AUTO: 58.2 % (ref 42.7–76)
NRBC BLD AUTO-RTO: 0 /100 WBC (ref 0–0.2)
PHOSPHATE SERPL-MCNC: 2.7 MG/DL (ref 2.5–4.5)
PLATELET # BLD AUTO: 79 10*3/MM3 (ref 140–450)
PMV BLD AUTO: 9.2 FL (ref 6–12)
POTASSIUM SERPL-SCNC: 4.9 MMOL/L (ref 3.5–5.2)
PROT SERPL-MCNC: 6.8 G/DL (ref 6–8.5)
RBC # BLD AUTO: 3.47 10*6/MM3 (ref 3.77–5.28)
SODIUM SERPL-SCNC: 131 MMOL/L (ref 136–145)
WBC NRBC COR # BLD AUTO: 4.7 10*3/MM3 (ref 3.4–10.8)

## 2024-11-13 PROCEDURE — 63710000001 INSULIN LISPRO (HUMAN) PER 5 UNITS

## 2024-11-13 PROCEDURE — 83735 ASSAY OF MAGNESIUM: CPT | Performed by: HOSPITALIST

## 2024-11-13 PROCEDURE — 63710000001 INSULIN GLARGINE PER 5 UNITS: Performed by: HOSPITALIST

## 2024-11-13 PROCEDURE — 84100 ASSAY OF PHOSPHORUS: CPT | Performed by: HOSPITALIST

## 2024-11-13 PROCEDURE — 80053 COMPREHEN METABOLIC PANEL: CPT | Performed by: INTERNAL MEDICINE

## 2024-11-13 PROCEDURE — 85025 COMPLETE CBC W/AUTO DIFF WBC: CPT | Performed by: HOSPITALIST

## 2024-11-13 PROCEDURE — 63710000001 INSULIN LISPRO (HUMAN) PER 5 UNITS: Performed by: HOSPITALIST

## 2024-11-13 PROCEDURE — 82948 REAGENT STRIP/BLOOD GLUCOSE: CPT

## 2024-11-13 PROCEDURE — 36415 COLL VENOUS BLD VENIPUNCTURE: CPT | Performed by: INTERNAL MEDICINE

## 2024-11-13 RX ORDER — INSULIN LISPRO 100 [IU]/ML
5 INJECTION, SOLUTION INTRAVENOUS; SUBCUTANEOUS
Status: DISCONTINUED | OUTPATIENT
Start: 2024-11-13 | End: 2024-11-13 | Stop reason: HOSPADM

## 2024-11-13 RX ORDER — PANTOPRAZOLE SODIUM 40 MG/1
40 TABLET, DELAYED RELEASE ORAL
Qty: 60 TABLET | Refills: 1 | Status: SHIPPED | OUTPATIENT
Start: 2024-11-13

## 2024-11-13 RX ADMIN — LEVOTHYROXINE SODIUM 100 MCG: 100 TABLET ORAL at 08:04

## 2024-11-13 RX ADMIN — INSULIN LISPRO 5 UNITS: 100 INJECTION, SOLUTION INTRAVENOUS; SUBCUTANEOUS at 09:22

## 2024-11-13 RX ADMIN — FOLIC ACID 1 MG: 1 TABLET ORAL at 08:04

## 2024-11-13 RX ADMIN — INSULIN GLARGINE 10 UNITS: 100 INJECTION, SOLUTION SUBCUTANEOUS at 09:22

## 2024-11-13 RX ADMIN — INSULIN LISPRO 6 UNITS: 100 INJECTION, SOLUTION INTRAVENOUS; SUBCUTANEOUS at 08:04

## 2024-11-13 RX ADMIN — RIFAXIMIN 550 MG: 550 TABLET ORAL at 08:04

## 2024-11-13 RX ADMIN — PANTOPRAZOLE SODIUM 40 MG: 40 TABLET, DELAYED RELEASE ORAL at 08:04

## 2024-11-13 RX ADMIN — LACTULOSE 10 G: 10 SOLUTION ORAL at 08:03

## 2024-11-13 RX ADMIN — Medication 10 ML: at 08:05

## 2024-11-13 RX ADMIN — THIAMINE HCL TAB 100 MG 100 MG: 100 TAB at 08:04

## 2024-11-13 NOTE — PROGRESS NOTES
Case Management Discharge Note      Final Note: Pt DC home with spouse and [facility]          Selected Continued Care - Discharged on 11/13/2024 Admission date: 11/6/2024 - Discharge disposition: Home-Health Care Svc      Destination    No services have been selected for the patient.                Durable Medical Equipment    No services have been selected for the patient.                Dialysis/Infusion    No services have been selected for the patient.                Home Medical Care Coordination complete.      Service Provider Services Address Phone Fax Patient Preferred    [facility] - [facility] [id number]  [place] [address] [phone] --              Therapy    No services have been selected for the patient.                Community Resources    No services have been selected for the patient.                Community & DME    No services have been selected for the patient.                         Final Discharge Disposition Code: 06 - home with home health care

## 2024-11-13 NOTE — PROGRESS NOTES
[facility] follow up d/t alcohol abuse; this writer reviewed chart and spoke with ADAM Brooks. Per RN, patient is doing great, mentation is improving and she slept overnight; some continued impulsivity noted.  CIWA scoring discontinued yesterday; patient has outpatient counseling resources as [facility]  Discharge likely home with spouse and possible home health; CCP involved and providing assistance.  No further needs/concerns noted at this time per RN and/or medical team; [facility] to continue following.

## 2024-11-13 NOTE — DISCHARGE SUMMARY
Patient Name: Corinna Almonte  : 1985  MRN: [id number]    Date of Admission: 2024  Date of Discharge:  2024  Primary Care Physician: System, Provider Not In      Chief Complaint:   Hypoglycemia and Seizures      Discharge Diagnoses     Active Hospital Problems    Diagnosis  POA    **Alcohol abuse with withdrawal [F10.139]  Yes    Esophageal varices in alcoholic cirrhosis [K70.30, I85.10]  Yes    Hypomagnesemia [E83.42]  Yes    Acute post-hemorrhagic anemia [D62]  Yes    Thrombocytopenia [D69.6]  Yes    Alcoholic cirrhosis [K70.30]  Yes    Depression [F32.A]  Yes    Type 2 diabetes mellitus with hyperglycemia [E11.65]  Yes    Alcohol withdrawal seizure [F10.939, R56.9]  Yes      Resolved Hospital Problems    Diagnosis Date Resolved POA    Hematemesis [K92.0] 2024 Yes        Hospital Course     Ms. Almonte is a 39 y.o. female with a history of TBI, alcohol abuse, diabetes, alcohol withdrawal seizures and cirrhosis with varices who presented to [facility] initially after 2 seizure episodes and episodes of dark emesis at home.  Please see the admitting history and physical for further details.  She was found to have coffee-ground emesis in ED with epistaxis.  Her hemoglobin was 6.8 with platelet count of 64, lactate of 2.8 and ammonia of 88.  Total bilirubin was elevated as well.  She was slightly hypoglycemic on admission though has been says she is generally very difficult to control.  She was given D10 and started on Protonix and octreotide drips.  Several blood transfusions were ordered, I believe a total of 4 units PRBCs as well as 1 unit of FFP and 1 unit platelets.  She was seen by gastroenterology and underwent EGD with findings of portal gastropathy and esophageal varices which were banded again.  They are recommending repeat EGD in 4 weeks.  She was started on lactulose and rifaximin.  She was eventually able to restart a diet and be transferred out of ICU.  She has  "remained stable over the last few days with hemoglobin hovering between 9 and 9.5.  Her glucose has now become poorly controlled as her diet has improved.  We have resumed some of her insulin but have not gotten all the way back to her home doses yet.  She is stable for discharge home today with outpatient follow-up with GI for repeat endoscopy in 4 weeks as previously recommended.  She has once again been counseled on alcohol abstinence and has been given appropriate resources.  She did have incidental notation of a heart murmur which she was not aware of previously.  I would recommend outpatient echocardiogram to follow this.      Day of Discharge     Subjective:  No events.  Ready to go    Physical Exam:  Temp:  [98.2 °F (36.8 °C)-98.8 °F (37.1 °C)] 98.8 °F (37.1 °C)  Heart Rate:  [90] 90  Resp:  [14-20] 14  BP: (106-116)/(71-80) 116/80  Body mass index is 21.72 kg/m².  Physical Exam  Vitals reviewed.   Eyes:      General: Scleral icterus present.   Cardiovascular:      Rate and Rhythm: Normal rate.      Heart sounds: Murmur heard.   Pulmonary:      Effort: No respiratory distress.      Breath sounds: Normal breath sounds.   Abdominal:      General: There is no distension.      Palpations: Abdomen is soft.      Tenderness: There is no abdominal tenderness.   Musculoskeletal:      Right lower leg: No edema.      Left lower leg: No edema.   Skin:     Coloration: Skin is jaundiced.   Neurological:      General: No focal deficit present.      Mental Status: She is alert.   Psychiatric:      Comments: Flat affect         Consultants     Consult Orders (all) (From admission, onward)       Start     Ordered    11/11/24 1201  Inpatient Hospitalist Consult  Once        Specialty:  Hospitalist  Provider:  Feliberto Paredes MD    11/11/24 1201    11/10/24 1315  Inpatient Nutrition Consult  Once        Provider:  (Not yet assigned)   Placed in \"And\" Linked Group    11/10/24 1314    11/10/24 1257  Inpatient Hospitalist " Consult  Once,   Status:  Canceled        Specialty:  Hospitalist  Provider:  Fitz Romero MD    11/10/24 1257    11/07/24 1747  Inpatient Access Center Consult  Once        Provider:  (Not yet assigned)    11/07/24 1747    11/07/24 0828  Inpatient Neurology Consult General  Once        Specialty:  Neurology  Provider:  Milan Mead MD    11/07/24 0827    11/07/24 0822  Inpatient IV Team Consult PICC 3 Lumens  STAT,   Status:  Canceled        Provider:  (Not yet assigned)    11/07/24 0821    11/07/24 0814  Inpatient IV Team Consult PICC 3 Lumens  Once,   Status:  Canceled        Provider:  (Not yet assigned)    11/07/24 0814    11/07/24 0702  Inpatient Neurology Consult General  IN AM        Specialty:  Neurology  Provider:  (Not yet assigned)    11/07/24 0247    11/07/24 0203  Inpatient Gastroenterology Consult  STAT        Specialty:  Gastroenterology  Provider:  Benito Alfred MD    11/07/24 0203    11/07/24 0108  Pulmonology (on-call MD unless specified)  Once,   Status:  Canceled        Specialty:  Pulmonary Disease  Provider:  (Not yet assigned)    11/07/24 0107                  Procedures     ESOPHAGOGASTRODUODENOSCOPY AT BEDSIDE with esophageal varices banding  - Grade II esophageal varices. Completely eradicated. Banded. - Portal hypertensive gastropathy. - Normal examined duodenum. - No specimens collected. I          Imaging Results (All)       Procedure Component Value Units Date/Time    CT Abdomen Pelvis With Contrast [[id number]] Collected: 11/07/24 0040     Updated: 11/07/24 0040    Narrative:        Patient: NAKUL EMMANUEL  Time Out: 00:39  Exam(s): CT ABDOMEN + PELVIS With Contrast     EXAM:    CT Abdomen and Pelvis With Intravenous Contrast    CLINICAL HISTORY:     Reason for exam: vomiting X 2 days.    TECHNIQUE:    Axial computed tomography images of the abdomen and pelvis with   intravenous contrast.  CTDI is 11.53 mGy and DLP is 634.3 mGy-cm.  This   CT exam was performed according  to the principle of ALARA (As Low As   Reasonably Achievable) by using one or more of the following dose   reduction techniques: automated exposure control, adjustment of the mA   and or kV according to patient size, and or use of iterative   reconstruction technique.    COMPARISON:    No relevant prior studies available.    FINDINGS:    Lung bases:  Unremarkable.  No mass.  No consolidation.     ABDOMEN:    Liver:  Unremarkable.  No mass.    Gallbladder and bile ducts:  Unremarkable.  No calcified stones.  No   ductal dilation.    Pancreas:  Multiple pancreatic calcifications compatible with chronic   pancreatitis.  No ductal dilation.    Spleen:  Enlarged spleen, measuring up to 15.1 cm in AP dimension.    Adrenals:  Unremarkable.  No mass.    Kidneys and ureters:  Punctate nonobstructing calculus at the lower   left kidney.    Stomach and bowel:  Unremarkable.  No obstruction.  No mucosal   thickening.     PELVIS:    Appendix:  Normal appendix.    Bladder:  Unremarkable.  No mass.    Reproductive:  Unremarkable as visualized.     ABDOMEN and PELVIS:    Intraperitoneal space:  Small abdominal and pelvic ascites is noted.    No free air.    Bones joints:  No acute fracture.  No dislocation.    Soft tissues:  Unremarkable.    Vasculature:  Unremarkable.  No abdominal aortic aneurysm.    Lymph nodes:  Unremarkable.  No enlarged lymph nodes.    IMPRESSION:       1.  No acute bowel pathology.  Normal appendix.  2.  Small abdominal and pelvic ascites.  3.  Splenomegaly.  4.  Multiple pancreatic calcifications compatible with chronic   pancreatitis.      Impression:          Electronically signed by Katie Avila MD on 11-07-24 at 0039    CT Head Without Contrast [[id number]] Collected: 11/07/24 0038     Updated: 11/07/24 0038    Narrative:        Patient: NAKUL EMMANUEL  Time Out: 00:37  Exam(s): CT HEAD Without Contrast     EXAM:    CT Head Without Intravenous Contrast    CLINICAL HISTORY:     Reason for exam: altered mental  status.    TECHNIQUE:    Axial computed tomography images of the head brain without intravenous   contrast.  CTDI is 55.44 mGy and DLP is 987.3 mGy-cm.  This CT exam was   performed according to the principle of ALARA (As Low As Reasonably   Achievable) by using one or more of the following dose reduction   techniques: automated exposure control, adjustment of the mA and or kV   according to patient size, and or use of iterative reconstruction   technique.    COMPARISON:    No relevant prior studies available.    FINDINGS:    Brain:  Mild periventricular white matter hypodensities compatible with   chronic ischemic microvascular changes.  No hemorrhage.    Ventricles:  Unremarkable.  No ventriculomegaly.    Bones joints:  Unremarkable.  No acute fracture.    Soft tissues:  Unremarkable.    Sinuses:  Unremarkable as visualized.  No acute sinusitis.    Mastoid air cells:  Unremarkable as visualized.  No mastoid effusion.    Other findings:  Mild generalized atrophy.    IMPRESSION:         No acute intracranial pathology.      Impression:          Electronically signed by Katie Avila MD on 11-07-24 at 0037    XR Chest 1 View [[id number]] Collected: 11/06/24 2341     Updated: 11/06/24 2341    Narrative:        Patient: NAKUL EMMANUEL  Time Out: 23:40  Exam(s): XR CXR 1 VIEW     EXAM:    XR Chest, 1 View    CLINICAL HISTORY:     Reason for exam: fever.    TECHNIQUE:    Frontal view of the chest.    COMPARISON:    No relevant prior studies available.    FINDINGS:    Lungs:  Unremarkable.  No consolidation.    Pleural space:  Unremarkable.  No pneumothorax.    Heart:  Unremarkable.  No cardiomegaly.    Mediastinum:  Unremarkable.  Normal mediastinal contour.    Bones joints:  Unremarkable.  No acute fracture.    IMPRESSION:         No acute cardiopulmonary process.      Impression:          Electronically signed by Katie Avila MD on 11-06-24 at 2340              Pertinent Labs     Results from last 7 days   Lab Units  "11/13/24  0606 11/12/24  0535 11/11/24  0552 11/10/24  0401   WBC 10*3/mm3 4.70 4.01 4.08 4.13   HEMOGLOBIN g/dL 9.3* 9.6* 9.9* 9.4*   PLATELETS 10*3/mm3 79* 91* 105* 97*     Results from last 7 days   Lab Units 11/13/24  0606 11/12/24  0535 11/11/24  0552 11/10/24  0401   SODIUM mmol/L 131* 135* 138 134*   POTASSIUM mmol/L 4.9 3.6 3.7 4.2   CHLORIDE mmol/L 100 102 102 102   CO2 mmol/L 22.0 21.1* 22.5 22.0   BUN mg/dL 7 4* 6 8   CREATININE mg/dL 0.54* 0.38* 0.37* 0.49*   GLUCOSE mg/dL 388* 205* 133* 243*   EGFR mL/min/1.73 119.5 130.1 130.9 122.4     Results from last 7 days   Lab Units 11/13/24  0606 11/12/24  0535 11/11/24  0552 11/10/24  0401   ALBUMIN g/dL 3.4* 3.1* 3.3* 3.3*   BILIRUBIN mg/dL 4.0* 4.0* 4.7* 5.0*   ALK PHOS U/L 174* 151* 163* 171*   AST (SGOT) U/L 38* 44* 54* 68*   ALT (SGPT) U/L 16 17 15 22     Results from last 7 days   Lab Units 11/13/24  0606 11/12/24  0535 11/11/24  0552 11/10/24  0401   CALCIUM mg/dL 8.6 8.2* 8.7 8.1*   ALBUMIN g/dL 3.4* 3.1* 3.3* 3.3*   MAGNESIUM mg/dL 2.0 1.4* 1.5* 1.7   PHOSPHORUS mg/dL 2.7 3.6 4.0 3.4     Results from last 7 days   Lab Units 11/08/24  1309 11/06/24  2301   AMYLASE U/L  --  588*   LIPASE U/L  --  15   AMMONIA umol/L 57* 88*     Results from last 7 days   Lab Units 11/07/24  0355 11/06/24  2301   CK TOTAL U/L  --  320*   HSTROP T ng/L 25* 32*   PROBNP pg/mL  --  315.0           Invalid input(s): \"LDLCALC\"  Results from last 7 days   Lab Units 11/06/24  2350   BLOODCX  No growth at 5 days  No growth at 5 days     Results from last 7 days   Lab Units 11/06/24  2304   COVID19  Not Detected       Test Results Pending at Discharge     Pending Results       None              Discharge Details        Discharge Medications        New Medications        Instructions Start Date   riFAXIMin 550 MG tablet  Commonly known as: XIFAXAN   550 mg, Oral, Every 12 Hours Scheduled             Changes to Medications        Instructions Start Date   pantoprazole 40 MG EC " tablet  Commonly known as: PROTONIX  What changed: when to take this   40 mg, Oral, 2 Times Daily Before Meals             Continue These Medications        Instructions Start Date   folic acid 1 MG tablet  Commonly known as: FOLVITE   1 mg, Oral, Daily      insulin glargine 100 UNIT/ML injection  Commonly known as: LANTUS, SEMGLEE   30 Units, Subcutaneous, Nightly      Insulin Lispro 100 UNIT/ML injection  Commonly known as: humaLOG   10 Units, Subcutaneous, 3 Times Daily Before Meals      lactulose 10 GM/15ML solution  Commonly known as: CHRONULAC   30 g, Oral, 2 Times Daily      levothyroxine 100 MCG tablet  Commonly known as: SYNTHROID, LEVOTHROID   Oral, Daily      thiamine 100 MG tablet  Commonly known as: VITAMIN B1   100 mg, Oral, Daily               No Known Allergies    Dis[place] [facility] [place] Diet:  No active diet order      Discharge Activity:       CODE STATUS:    Code Status and Medical Interventions: CPR (Attempt to Resuscitate); Full Support   Ordered at: 11/09/24 1338     Code Status (Patient has no pulse and is not breathing):    CPR (Attempt to Resuscitate)     Medical Interventions (Patient has pulse or is breathing):    Full Support       No future appointments.   Contact information for follow-up providers       System, Provider Not In .    Contact information:  [facility] [id number]                       Contact information for after-discharge care       Home Medical Care       [facility] .    Service: Home Health Services  Contact information:  [id number]  [facility] [address] [phone]                                   Time Spent on Discharge:  Greater than 30 minutes      Rocael Jason MD  [facility]  11/13/24  16:53 EST

## 2024-11-13 NOTE — PLAN OF CARE
Goal Outcome Evaluation:  Plan of Care Reviewed With: patient        Progress: improving  Outcome Evaluation: a&ox2-3 overnight; VSS: mentation improving; reeducated on not getting up without staff present r/t weakness; refused meds educated on importance of lactulose; appetite increasing throughout night; x1 assist to bathroom; will continue to monitor;       Problem: Adult Inpatient Plan of Care  Goal: Absence of Hospital-Acquired Illness or Injury  Outcome: Progressing  Intervention: Identify and Manage Fall Risk  Recent Flowsheet Documentation  Taken 11/13/2024 0400 by Cecilia Howard RN  Safety Promotion/Fall Prevention: safety round/check completed  Taken 11/13/2024 0200 by Cecilia Howard RN  Safety Promotion/Fall Prevention: safety round/check completed  Taken 11/13/2024 0000 by Cecilia Howard RN  Safety Promotion/Fall Prevention: safety round/check completed  Taken 11/12/2024 2200 by Cecilia Howard RN  Safety Promotion/Fall Prevention: safety round/check completed  Taken 11/12/2024 2000 by Cecilia Howard RN  Safety Promotion/Fall Prevention: safety round/check completed  Intervention: Prevent Skin Injury  Recent Flowsheet Documentation  Taken 11/12/2024 2000 by Cecilia Howard RN  Body Position: position changed independently  Skin Protection: incontinence pads utilized  Intervention: Prevent and Manage VTE (Venous Thromboembolism) Risk  Recent Flowsheet Documentation  Taken 11/12/2024 2000 by Cecilia Howard RN  VTE Prevention/Management:   bilateral   SCDs (sequential compression devices) off  Intervention: Prevent Infection  Recent Flowsheet Documentation  Taken 11/13/2024 0400 by Cecilia Howard RN  Infection Prevention: single patient room provided  Taken 11/13/2024 0200 by Cecilia Howard RN  Infection Prevention: single patient room provided  Taken 11/13/2024 0000 by Cecilia Howard RN  Infection Prevention: single patient room provided  Taken 11/12/2024 2200 by Cecilia Howard RN  Infection  Prevention: single patient room provided  Taken 11/12/2024 2000 by Cecilia Howard, RN  Infection Prevention: single patient room provided

## 2024-11-13 NOTE — OUTREACH NOTE
Prep Survey      Flowsheet Row Responses   [facility] facility patient discharged from? [place]   Is LACE score < 7 ? No   Eligibility Not Eligible   What are the reasons patient is not eligible? Other  [Alcohol abuse with withdrawal]   Does the patient have one of the following disease processes/diagnoses(primary or secondary)? Other   Prep survey completed? Yes            Mitzi HERNANDEZ - Registered Nurse

## 2024-11-25 NOTE — ED PROVIDER NOTES
EMERGENCY DEPARTMENT ENCOUNTER  Room Number:    PCP: System, Provider Not In  Independent Historians: EMS      HPI:  Chief Complaint: cardiac arrest    Context: Corinna Almonte is a 39 y.o. female with a medical history of alcoholic cirrhosis who presents to the ED c/o acute cardiac arrest.  EMS reports patient found down at approximately 8:30 am by  after patient had been complaining of excessive thirst all morning.  Patient pulseless and in PEA through EMS care, received 6 epi pre hospital.     Prescription drug monitoring program review:         PAST MEDICAL HISTORY  Active Ambulatory Problems     Diagnosis Date Noted    Polysubstance overdose 2016    Alcohol withdrawal seizure 2023    Alcoholic hepatitis 2023    Alcoholic cirrhosis 2023    Esophageal varices 2023    Type 2 diabetes mellitus with hyperglycemia 2023    Anxiety 2023    Depression 2023    Severe malnutrition 2023    Thrombocytopenia 2023    Acute post-hemorrhagic anemia 2023    Coagulopathy 2023    Alcohol abuse with withdrawal 2024    History of esophageal varices 2024    Hypomagnesemia 2024    Esophageal varices in alcoholic cirrhosis 2024     Resolved Ambulatory Problems     Diagnosis Date Noted    Pancytopenia 2023    Hypomagnesemia 2023    Hypokalemia 2023    Hematemesis 2024     Past Medical History:   Diagnosis Date    ALC (alcoholic liver cirrhosis)     Alcohol abuse with alcohol-induced anxiety disorder     Disease of thyroid gland     History of anemia     History of thrombocytopenia     Status post alcohol detoxification          PAST SURGICAL HISTORY  Past Surgical History:   Procedure Laterality Date     SECTION      x2    ENDOSCOPY N/A 2023    Procedure: ESOPHAGOGASTRODUODENOSCOPY;  Surgeon: Yony Richardson MD;  Location: [facility] ENDOSCOPY;  Service: Gastroenterology;  Laterality: N/A;   pre: CIRRHOSIS  post: VARICES, PORTAL GASTROPATHY, GASTRITIS    ENDOSCOPY N/A 11/7/2024    Procedure: ESOPHAGOGASTRODUODENOSCOPY AT BEDSIDE with esophageal varices banding;  Surgeon: Yony Brooks MD;  Location: [facility] ENDOSCOPY;  Service: Gastroenterology;  Laterality: N/A;  esophageal varices    TONSILLECTOMY           FAMILY HISTORY  Family History   Problem Relation Age of Onset    Alcohol abuse Father     Drug abuse Father     Alcohol abuse Maternal Grandfather     Depression Maternal Grandmother     Drug abuse Maternal Grandmother     Alcohol abuse Paternal Grandfather          SOCIAL HISTORY  Social History     Socioeconomic History    Marital status:    Tobacco Use    Smoking status: Never   Vaping Use    Vaping status: Never Used   Substance and Sexual Activity    Alcohol use: Yes     Comment: drinks vodka daily up to 1/2 fifth, last drink 10/30/16    Drug use: Defer    Sexual activity: Defer         ALLERGIES  Patient has no known allergies.      REVIEW OF SYSTEMS  Review of Systems  Included in HPI  All systems reviewed and negative except for those discussed in HPI.      PHYSICAL EXAM    I have reviewed the triage vital signs and nursing notes.    ED Triage Vitals [11/25/24 0949]   Temp Heart Rate Resp BP SpO2   -- (!) 0 (!) 0 -- --      Temp src Heart Rate Source Patient Position BP Location FiO2 (%)   -- -- -- -- --       Physical Exam  GENERAL: Unresponsive with CPR progress  SKIN: Warm, dry, jaundiced  HENT: Normocephalic, atraumatic  EYES: no scleral icterus  CV: Asystole  RESPIRATORY: No spontaneous respiratory effort  ABDOMEN: soft, nontender, distended  MUSCULOSKELETAL: no deformity  NEURO: Unresponsive, no spontaneous movement            LAB RESULTS  No results found for this or any previous visit (from the past 24 hours).      RADIOLOGY  No Radiology Exams Resulted Within Past 24 Hours      MEDICATIONS GIVEN IN ER  Medications   EPINEPHrine (ADRENALIN) injection (1 mg Intravenous  Given 11/25/24 0957)         ORDERS PLACED DURING THIS VISIT:  Orders Placed This Encounter   Procedures    Intubation         OUTPATIENT MEDICATION MANAGEMENT:  Current Facility-Administered Medications Ordered in Epic   Medication Dose Route Frequency Provider Last Rate Last Admin    EPINEPHrine (ADRENALIN) injection   Intravenous Code / Trauma / Sedation Medication Rocael Garcia MD   1 mg at 11/25/24 0957     Current Outpatient Medications Ordered in Epic   Medication Sig Dispense Refill    folic acid (FOLVITE) 1 MG tablet Take 1 tablet by mouth Daily.      insulin glargine (LANTUS, SEMGLEE) 100 UNIT/ML injection Inject 30 Units under the skin into the appropriate area as directed Every Night.      Insulin Lispro (humaLOG) 100 UNIT/ML injection Inject 10 Units under the skin into the appropriate area as directed 3 (Three) Times a Day Before Meals.      lactulose (CHRONULAC) 10 GM/15ML solution Take 45 mL by mouth 2 (Two) Times a Day. 2700 mL 0    levothyroxine (SYNTHROID, LEVOTHROID) 100 MCG tablet Take  by mouth Daily.      pantoprazole (PROTONIX) 40 MG EC tablet Take 1 tablet by mouth 2 (Two) Times a Day Before Meals. 60 tablet 1    riFAXIMin (XIFAXAN) 550 MG tablet Take 1 tablet by mouth Every 12 (Twelve) Hours. Indications: Impaired Brain Function due to Liver Disease 60 tablet 0    thiamine (VITAMIN B1) 100 MG tablet Take 1 tablet by mouth Daily.           PROCEDURES  Intubation    Date/Time: 11/25/2024 10:09 AM    Performed by: Rocael Garcia MD  Authorized by: Rocael Garcia MD    Consent:     Consent obtained:  Emergent situation  Universal protocol:     Patient identity confirmed:  Arm band  Pre-procedure details:     Indications: cardio/pulmonary arrest and respiratory failure      Patient status:  Unresponsive    Pharmacologic strategy: none      Induction agents:  None    Paralytics:  None  Procedure details:     Preoxygenation:  Bag valve mask    CPR in progress: yes      Number of  attempts:  1  Successful intubation attempt details:     Intubation method:  Oral    Intubation technique: video assisted      Laryngoscope blade:  Mac 3    Tube size (mm):  7.0    Tube type:  Cuffed    Tube visualized through cords: yes    Placement assessment:     ETT at teeth/gumline (cm):  26    Placement verification: direct visualization    Post-procedure details:     Procedure completion:  Tolerated  Comments:      Intubation performed with active CPR in progress          PROGRESS, DATA ANALYSIS, CONSULTS, AND MEDICAL DECISION MAKING  All labs have been independently interpreted by me.  All radiology studies have been reviewed by me. All EKG's have been independently viewed and interpreted by me.  Discussion below represents my analysis of pertinent findings related to patient's condition, differential diagnosis, treatment plan and final disposition.    Differential diagnosis includes but is not limited to liver failure, electrolyte abnormality, cardiac arrest.    Clinical Scores:                                     ED Course as of 24 1011   Mon 2024   1010 Patient arrived with a total downtime of approximately 1-1/2 hours.  Patient had received 6 epinephrine injections prehospital and was noted to be PEA by EMS staff.  Upon arrival patient received 3 additional epinephrine injections and had been in asystole throughout resuscitative efforts.  7.0 ET tube placed wall CPR was in progress.  Gross hemorrhage was noted during intubation procedure and patient continued hemorrhaging following intubation.  Ultimately resuscitative efforts were deemed futile and patient was pronounced  at 9:58 AM [MW]      ED Course User Index  [MW] Rocael Garcia MD             AS OF 10:11 EST VITALS:    BP -    HR - (!) 0  TEMP -    O2 SATS -      COMPLEXITY OF CARE   following resuscitative efforts      DIAGNOSIS  Final diagnoses:   Cardiac arrest         DISPOSITION  ED Disposition       ED  Disposition       Condition   --    Comment   --                Please note that portions of this document were completed with a voice recognition program.    Note Disclaimer: At [facility], we believe that sharing information builds trust and better relationships. You are receiving this note because you recently visited [facility]. It is possible you will see health information before a provider has talked with you about it. This kind of information can be easy to misunderstand. To help you fully understand what it means for your health, we urge you to discuss this note with your provider.         Rocael Garcia MD  24 1011

## 2024-11-25 NOTE — ED NOTES
asked about  home. He reported patient was a  and [facility] decision. He was given the hospital phone number to call back with plan.

## 2024-11-25 NOTE — CONSULTS
visited pt's spouse in the family waiting room and later escorted him to pt's room secondary to pt death.  In the room,  provided emotional and spiritual support via presence and dialogue.

## (undated) DEVICE — ADAPT CLN BIOGUARD AIR/H2O DISP

## (undated) DEVICE — CANN O2 ETCO2 FITS ALL CONN CO2 SMPL A/ 7IN DISP LF

## (undated) DEVICE — MULTIPLE BAND LIGATOR: Brand: SPEEDBAND SUPERVIEW SUPER 7

## (undated) DEVICE — BLCK/BITE BLOX W/DENTL/RIM W/STRAP 54F

## (undated) DEVICE — LN SMPL CO2 SHTRM SD STREAM W/M LUER

## (undated) DEVICE — TUBING, SUCTION, 1/4" X 10', STRAIGHT: Brand: MEDLINE

## (undated) DEVICE — KT ORCA ORCAPOD DISP STRL

## (undated) DEVICE — SENSR O2 OXIMAX FNGR A/ 18IN NONSTR